# Patient Record
Sex: FEMALE | Race: WHITE | NOT HISPANIC OR LATINO | Employment: OTHER | URBAN - METROPOLITAN AREA
[De-identification: names, ages, dates, MRNs, and addresses within clinical notes are randomized per-mention and may not be internally consistent; named-entity substitution may affect disease eponyms.]

---

## 2023-08-29 ENCOUNTER — HOSPITAL ENCOUNTER (OUTPATIENT)
Dept: DATA CONVERSION | Facility: HOSPITAL | Age: 73
Discharge: HOME | End: 2023-08-29
Payer: MEDICARE

## 2023-08-29 DIAGNOSIS — Z12.31 ENCOUNTER FOR SCREENING MAMMOGRAM FOR MALIGNANT NEOPLASM OF BREAST: ICD-10-CM

## 2023-08-30 PROBLEM — E86.0 DEHYDRATION: Status: ACTIVE | Noted: 2023-08-30

## 2023-08-30 PROBLEM — H60.509 ACUTE OTITIS EXTERNA: Status: RESOLVED | Noted: 2023-08-30 | Resolved: 2023-08-30

## 2023-08-30 PROBLEM — E78.2 MIXED HYPERLIPIDEMIA: Status: ACTIVE | Noted: 2023-08-30

## 2023-08-30 PROBLEM — R22.0 SWELLING, MASS, OR LUMP ON FACE: Status: ACTIVE | Noted: 2023-08-30

## 2023-08-30 PROBLEM — G63 NEUROPATHY ASSOCIATED WITH ENDOCRINE DISORDER (MULTI): Status: ACTIVE | Noted: 2023-08-30

## 2023-08-30 PROBLEM — H04.123 BILATERAL DRY EYES: Status: ACTIVE | Noted: 2023-08-30

## 2023-08-30 PROBLEM — R26.81 UNSTEADY GAIT: Status: ACTIVE | Noted: 2023-08-30

## 2023-08-30 PROBLEM — J31.0 RHINITIS: Status: ACTIVE | Noted: 2023-08-30

## 2023-08-30 PROBLEM — Z96.659 S/P KNEE REPLACEMENT: Status: ACTIVE | Noted: 2023-08-30

## 2023-08-30 PROBLEM — N64.4 MASTODYNIA: Status: ACTIVE | Noted: 2023-08-30

## 2023-08-30 PROBLEM — R40.1 CLOUDED CONSCIOUSNESS: Status: ACTIVE | Noted: 2023-08-30

## 2023-08-30 PROBLEM — N32.81 OVERACTIVE BLADDER: Status: ACTIVE | Noted: 2023-08-30

## 2023-08-30 PROBLEM — H92.09 EAR PAIN: Status: ACTIVE | Noted: 2023-08-30

## 2023-08-30 PROBLEM — H69.91 DYSFUNCTION OF RIGHT EUSTACHIAN TUBE: Status: ACTIVE | Noted: 2023-08-30

## 2023-08-30 PROBLEM — E11.9 DIABETES MELLITUS, TYPE 2 (MULTI): Status: ACTIVE | Noted: 2023-08-30

## 2023-08-30 PROBLEM — F41.9 ANXIETY DISORDER: Status: ACTIVE | Noted: 2023-08-30

## 2023-08-30 PROBLEM — F32.A DEPRESSION: Status: ACTIVE | Noted: 2023-08-30

## 2023-08-30 PROBLEM — S69.90XA INJURY OF HAND: Status: ACTIVE | Noted: 2023-08-30

## 2023-08-30 PROBLEM — D50.9 IRON DEFICIENCY ANEMIA: Status: ACTIVE | Noted: 2023-08-30

## 2023-08-30 PROBLEM — R42 LIGHTHEADEDNESS: Status: ACTIVE | Noted: 2023-08-30
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
## 2023-08-30 PROBLEM — E11.9 DIABETES MELLITUS (MULTI): Status: ACTIVE | Noted: 2023-08-30

## 2023-08-30 PROBLEM — K21.9 GASTROESOPHAGEAL REFLUX DISEASE: Status: ACTIVE | Noted: 2023-08-30

## 2023-08-30 PROBLEM — R42 DIZZINESS: Status: ACTIVE | Noted: 2023-08-30

## 2023-08-30 PROBLEM — Z95.9 CARDIAC DEVICE IN SITU: Status: ACTIVE | Noted: 2023-08-30

## 2023-08-30 PROBLEM — E87.6 HYPOKALEMIA: Status: ACTIVE | Noted: 2023-08-30

## 2023-08-30 PROBLEM — N60.19 FIBROCYSTIC BREAST CHANGES: Status: ACTIVE | Noted: 2023-08-30

## 2023-08-30 PROBLEM — F31.9 BIPOLAR DISORDER (MULTI): Status: ACTIVE | Noted: 2023-08-30

## 2023-08-30 PROBLEM — M19.90 OSTEOARTHRITIS: Status: ACTIVE | Noted: 2023-08-30

## 2023-08-30 PROBLEM — K13.70 ORAL MUCOSAL LESION: Status: ACTIVE | Noted: 2023-08-30

## 2023-08-30 PROBLEM — R13.10 DYSPHAGIA: Status: ACTIVE | Noted: 2023-08-30

## 2023-08-30 PROBLEM — M25.559 HIP PAIN: Status: ACTIVE | Noted: 2023-08-30

## 2023-08-30 PROBLEM — M26.609 TMJ (TEMPOROMANDIBULAR JOINT SYNDROME): Status: ACTIVE | Noted: 2023-08-30

## 2023-08-30 PROBLEM — D33.3 ACOUSTIC NEUROMA (MULTI): Status: ACTIVE | Noted: 2023-08-30

## 2023-08-30 PROBLEM — E78.5 DYSLIPIDEMIA: Status: ACTIVE | Noted: 2023-08-30

## 2023-08-30 PROBLEM — I12.9 HYPERTENSIVE CHRONIC KIDNEY DISEASE WITH STAGE 1 THROUGH STAGE 4 CHRONIC KIDNEY DISEASE, OR UNSPECIFIED CHRONIC KIDNEY DISEASE: Status: ACTIVE | Noted: 2023-08-30

## 2023-08-30 PROBLEM — S89.90XA INJURY OF KNEE: Status: ACTIVE | Noted: 2023-08-30

## 2023-08-30 PROBLEM — S09.92XA NOSE INJURY: Status: ACTIVE | Noted: 2023-08-30

## 2023-08-30 PROBLEM — E78.00 HIGH CHOLESTEROL: Status: ACTIVE | Noted: 2023-08-30

## 2023-08-30 PROBLEM — E55.9 VITAMIN D DEFICIENCY: Status: ACTIVE | Noted: 2023-08-30

## 2023-08-30 PROBLEM — N39.0 ACUTE LOWER URINARY TRACT INFECTION: Status: RESOLVED | Noted: 2023-08-30 | Resolved: 2023-08-30

## 2023-08-30 PROBLEM — I42.9 CARDIOMYOPATHY (MULTI): Status: ACTIVE | Noted: 2023-08-30

## 2023-08-30 PROBLEM — I63.9 CEREBROVASCULAR ACCIDENT (MULTI): Status: ACTIVE | Noted: 2023-08-30

## 2023-08-30 PROBLEM — G45.9 TRANSIENT ISCHEMIC ATTACK: Status: ACTIVE | Noted: 2023-08-30

## 2023-08-30 PROBLEM — F31.9 AFFECTIVE PSYCHOSIS, BIPOLAR (MULTI): Status: ACTIVE | Noted: 2023-08-30

## 2023-08-30 PROBLEM — I10 ESSENTIAL (PRIMARY) HYPERTENSION: Status: ACTIVE | Noted: 2023-08-30

## 2023-08-30 PROBLEM — H53.8 HAZY VISION: Status: ACTIVE | Noted: 2023-08-30

## 2023-08-30 PROBLEM — D64.9 ANEMIA: Status: ACTIVE | Noted: 2023-08-30

## 2023-08-30 PROBLEM — E34.9 NEUROPATHY ASSOCIATED WITH ENDOCRINE DISORDER (MULTI): Status: ACTIVE | Noted: 2023-08-30

## 2023-08-30 PROBLEM — R11.2 NAUSEA & VOMITING: Status: ACTIVE | Noted: 2023-08-30

## 2023-08-30 PROBLEM — S09.90XA INJURY OF HEAD: Status: ACTIVE | Noted: 2023-08-30

## 2023-08-30 PROBLEM — R73.09 BLOOD GLUCOSE ABNORMAL: Status: ACTIVE | Noted: 2023-08-30

## 2023-08-30 PROBLEM — R10.9 ABDOMINAL PAIN: Status: ACTIVE | Noted: 2023-08-30

## 2023-08-30 PROBLEM — N63.0 BREAST LUMP: Status: ACTIVE | Noted: 2023-08-30

## 2023-08-30 PROBLEM — F99 MENTAL DISORDER: Status: ACTIVE | Noted: 2023-08-30

## 2023-08-30 PROBLEM — N60.09 SOLITARY CYST OF BREAST: Status: ACTIVE | Noted: 2023-08-30

## 2023-08-30 PROBLEM — I51.89 IMPAIRED CARDIAC FUNCTION: Status: ACTIVE | Noted: 2023-08-30

## 2023-08-30 PROBLEM — R55 SYNCOPE: Status: ACTIVE | Noted: 2023-08-30

## 2023-08-30 RX ORDER — SERTRALINE HYDROCHLORIDE 50 MG/1
2 TABLET, FILM COATED ORAL DAILY
COMMUNITY
Start: 2017-03-27 | End: 2023-11-08 | Stop reason: ALTCHOICE

## 2023-08-30 RX ORDER — LORATADINE 10 MG/1
10 TABLET ORAL AS NEEDED
COMMUNITY

## 2023-08-30 RX ORDER — BLOOD-GLUCOSE TRANSMITTER
EACH MISCELLANEOUS
COMMUNITY
Start: 2021-07-07 | End: 2024-02-20 | Stop reason: WASHOUT

## 2023-08-30 RX ORDER — TRAMADOL HYDROCHLORIDE 50 MG/1
1 TABLET ORAL EVERY 6 HOURS PRN
COMMUNITY
Start: 2023-02-08 | End: 2024-02-20 | Stop reason: WASHOUT

## 2023-08-30 RX ORDER — HYDROCHLOROTHIAZIDE 12.5 MG/1
1 TABLET ORAL EVERY MORNING
COMMUNITY
Start: 2017-08-09

## 2023-08-30 RX ORDER — HYDROCODONE BITARTRATE AND ACETAMINOPHEN 7.5; 3 MG/1; MG/1
TABLET ORAL
COMMUNITY
Start: 2017-01-25 | End: 2024-02-20 | Stop reason: WASHOUT

## 2023-08-30 RX ORDER — OMEPRAZOLE 40 MG/1
1 CAPSULE, DELAYED RELEASE ORAL DAILY
COMMUNITY
Start: 2010-08-02 | End: 2023-11-08 | Stop reason: ALTCHOICE

## 2023-08-30 RX ORDER — BIOTIN 1 MG
TABLET ORAL 4 TIMES DAILY
COMMUNITY
Start: 2015-01-21 | End: 2024-03-13 | Stop reason: WASHOUT

## 2023-08-30 RX ORDER — ACETAMINOPHEN 325 MG/1
1 TABLET ORAL EVERY 6 HOURS PRN
COMMUNITY
End: 2023-11-08 | Stop reason: ALTCHOICE

## 2023-08-30 RX ORDER — FLUTICASONE PROPIONATE 50 MCG
2 SPRAY, SUSPENSION (ML) NASAL DAILY
COMMUNITY
Start: 2021-10-22 | End: 2023-11-08 | Stop reason: ALTCHOICE

## 2023-08-30 RX ORDER — LAMOTRIGINE 200 MG/1
1 TABLET ORAL DAILY
COMMUNITY

## 2023-08-30 RX ORDER — CHOLECALCIFEROL (VITAMIN D3) 25 MCG
2 TABLET ORAL DAILY
COMMUNITY

## 2023-08-30 RX ORDER — LISINOPRIL 10 MG/1
TABLET ORAL DAILY
COMMUNITY
Start: 2023-01-06 | End: 2024-02-14 | Stop reason: DRUGHIGH

## 2023-08-30 RX ORDER — TRIAMCINOLONE ACETONIDE 1 MG/G
1 PASTE DENTAL 3 TIMES DAILY
COMMUNITY
Start: 2017-11-10 | End: 2023-11-08 | Stop reason: ALTCHOICE

## 2023-08-30 RX ORDER — TIZANIDINE 4 MG/1
4 TABLET ORAL 2 TIMES DAILY PRN
COMMUNITY
Start: 2023-07-25

## 2023-08-30 RX ORDER — INSULIN GLARGINE 100 [IU]/ML
INJECTION, SOLUTION SUBCUTANEOUS
COMMUNITY
End: 2023-11-08 | Stop reason: ALTCHOICE

## 2023-08-30 RX ORDER — SERTRALINE HYDROCHLORIDE 50 MG/1
1 TABLET, FILM COATED ORAL DAILY
COMMUNITY

## 2023-08-30 RX ORDER — CHOLECALCIFEROL (VITAMIN D3) 25 MCG
1 TABLET ORAL DAILY
COMMUNITY
End: 2023-11-08 | Stop reason: ALTCHOICE

## 2023-08-30 RX ORDER — DOCUSATE SODIUM 100 MG/1
1 CAPSULE, LIQUID FILLED ORAL DAILY PRN
COMMUNITY

## 2023-08-30 RX ORDER — TRIAMCINOLONE ACETONIDE 1 MG/G
1 CREAM TOPICAL 2 TIMES WEEKLY
COMMUNITY
End: 2024-02-14 | Stop reason: ALTCHOICE

## 2023-08-30 RX ORDER — POLYETHYLENE GLYCOL 3350 17 G/17G
17 POWDER, FOR SOLUTION ORAL DAILY PRN
COMMUNITY

## 2023-08-30 RX ORDER — CLOBETASOL PROPIONATE 0.5 MG/G
1 CREAM TOPICAL 2 TIMES DAILY
COMMUNITY
End: 2024-02-20 | Stop reason: WASHOUT

## 2023-08-30 RX ORDER — IMIPRAMINE HYDROCHLORIDE 25 MG/1
1 TABLET, FILM COATED ORAL NIGHTLY
COMMUNITY
Start: 2017-01-17 | End: 2023-11-08 | Stop reason: ALTCHOICE

## 2023-08-30 RX ORDER — METFORMIN HYDROCHLORIDE 500 MG/1
500 TABLET ORAL 2 TIMES DAILY
COMMUNITY
Start: 2014-05-20 | End: 2024-03-13 | Stop reason: WASHOUT

## 2023-08-30 RX ORDER — ACETAMINOPHEN 500 MG
2 TABLET ORAL EVERY 6 HOURS PRN
COMMUNITY
Start: 2023-02-08 | End: 2024-02-20 | Stop reason: WASHOUT

## 2023-08-30 RX ORDER — OMEPRAZOLE 20 MG/1
40 CAPSULE, DELAYED RELEASE ORAL DAILY
COMMUNITY
End: 2024-05-15 | Stop reason: WASHOUT

## 2023-08-30 RX ORDER — CHLORHEXIDINE GLUCONATE ORAL RINSE 1.2 MG/ML
15 SOLUTION DENTAL 2 TIMES DAILY
COMMUNITY
Start: 2017-02-15 | End: 2023-11-08 | Stop reason: ALTCHOICE

## 2023-08-30 RX ORDER — LANOLIN ALCOHOL/MO/W.PET/CERES
1 CREAM (GRAM) TOPICAL DAILY
COMMUNITY
Start: 2020-09-16 | End: 2024-05-20 | Stop reason: WASHOUT

## 2023-08-30 RX ORDER — BLOOD-GLUCOSE,RECEIVER,CONT
EACH MISCELLANEOUS
COMMUNITY
Start: 2021-07-07 | End: 2024-02-20 | Stop reason: WASHOUT

## 2023-08-30 RX ORDER — LOPERAMIDE HCL 2 MG
1 TABLET ORAL 4 TIMES DAILY PRN
COMMUNITY

## 2023-08-30 RX ORDER — FERROUS SULFATE 325(65) MG
1 TABLET, DELAYED RELEASE (ENTERIC COATED) ORAL
COMMUNITY
Start: 2020-06-08

## 2023-08-30 RX ORDER — GLIPIZIDE 5 MG/1
1 TABLET ORAL 2 TIMES DAILY
COMMUNITY
Start: 2014-05-20 | End: 2024-02-20

## 2023-08-30 RX ORDER — HUMAN INSULIN 100 [IU]/ML
INJECTION, SUSPENSION SUBCUTANEOUS
COMMUNITY
End: 2023-11-08 | Stop reason: ALTCHOICE

## 2023-08-30 RX ORDER — ATORVASTATIN CALCIUM 40 MG/1
1 TABLET, FILM COATED ORAL DAILY
COMMUNITY
Start: 2016-05-23 | End: 2024-06-10 | Stop reason: SDUPTHER

## 2023-08-30 RX ORDER — FLUTICASONE PROPIONATE 50 MCG
1 SPRAY, SUSPENSION (ML) NASAL DAILY PRN
COMMUNITY

## 2023-08-30 RX ORDER — MIRTAZAPINE 15 MG/1
1 TABLET, FILM COATED ORAL NIGHTLY
COMMUNITY
Start: 2017-03-27 | End: 2024-02-14 | Stop reason: ALTCHOICE

## 2023-09-19 ENCOUNTER — DOCUMENTATION (OUTPATIENT)
Dept: PRIMARY CARE | Facility: CLINIC | Age: 73
End: 2023-09-19
Payer: MEDICARE

## 2023-10-03 ENCOUNTER — ANCILLARY PROCEDURE (OUTPATIENT)
Dept: RADIOLOGY | Facility: CLINIC | Age: 73
End: 2023-10-03
Payer: MEDICARE

## 2023-10-03 DIAGNOSIS — S06.5XAA TRAUMATIC SUBDURAL HEMORRHAGE WITH LOSS OF CONSCIOUSNESS STATUS UNKNOWN, INITIAL ENCOUNTER (MULTI): ICD-10-CM

## 2023-10-03 PROCEDURE — 70450 CT HEAD/BRAIN W/O DYE: CPT

## 2023-10-20 ENCOUNTER — PATIENT OUTREACH (OUTPATIENT)
Dept: PRIMARY CARE | Facility: CLINIC | Age: 73
End: 2023-10-20
Payer: MEDICARE

## 2023-10-20 DIAGNOSIS — E78.2 MIXED HYPERLIPIDEMIA: ICD-10-CM

## 2023-10-20 DIAGNOSIS — I10 ESSENTIAL (PRIMARY) HYPERTENSION: ICD-10-CM

## 2023-10-25 ENCOUNTER — PATIENT OUTREACH (OUTPATIENT)
Dept: PRIMARY CARE | Facility: CLINIC | Age: 73
End: 2023-10-25
Payer: MEDICARE

## 2023-10-27 ENCOUNTER — LAB (OUTPATIENT)
Dept: LAB | Facility: LAB | Age: 73
End: 2023-10-27
Payer: MEDICARE

## 2023-10-27 DIAGNOSIS — I10 ESSENTIAL (PRIMARY) HYPERTENSION: ICD-10-CM

## 2023-10-27 DIAGNOSIS — E34.9 ENDOCRINE DISORDER, UNSPECIFIED: ICD-10-CM

## 2023-10-27 DIAGNOSIS — E11.9 TYPE 2 DIABETES MELLITUS WITHOUT COMPLICATIONS (MULTI): Primary | ICD-10-CM

## 2023-10-27 LAB
ALBUMIN SERPL-MCNC: 4.3 G/DL (ref 3.5–5)
ALP BLD-CCNC: 156 U/L (ref 35–125)
ALT SERPL-CCNC: 9 U/L (ref 5–40)
ANION GAP SERPL CALC-SCNC: 17 MMOL/L
AST SERPL-CCNC: 14 U/L (ref 5–40)
BILIRUB SERPL-MCNC: 0.3 MG/DL (ref 0.1–1.2)
BUN SERPL-MCNC: 24 MG/DL (ref 8–25)
CALCIUM SERPL-MCNC: 9.7 MG/DL (ref 8.5–10.4)
CHLORIDE SERPL-SCNC: 98 MMOL/L (ref 97–107)
CO2 SERPL-SCNC: 26 MMOL/L (ref 24–31)
CREAT SERPL-MCNC: 1.8 MG/DL (ref 0.4–1.6)
ERYTHROCYTE [DISTWIDTH] IN BLOOD BY AUTOMATED COUNT: 12.8 % (ref 11.5–14.5)
EST. AVERAGE GLUCOSE BLD GHB EST-MCNC: 146 MG/DL
GFR SERPL CREATININE-BSD FRML MDRD: 30 ML/MIN/1.73M*2
GLUCOSE SERPL-MCNC: 183 MG/DL (ref 65–99)
HBA1C MFR BLD: 6.7 %
HCT VFR BLD AUTO: 37.9 % (ref 36–46)
HGB BLD-MCNC: 12 G/DL (ref 12–16)
MCH RBC QN AUTO: 27.3 PG (ref 26–34)
MCHC RBC AUTO-ENTMCNC: 31.7 G/DL (ref 32–36)
MCV RBC AUTO: 86 FL (ref 80–100)
NRBC BLD-RTO: 0 /100 WBCS (ref 0–0)
PLATELET # BLD AUTO: 339 X10*3/UL (ref 150–450)
PMV BLD AUTO: 10.8 FL (ref 7.5–11.5)
POTASSIUM SERPL-SCNC: 4.1 MMOL/L (ref 3.4–5.1)
PROT SERPL-MCNC: 7 G/DL (ref 5.9–7.9)
RBC # BLD AUTO: 4.4 X10*6/UL (ref 4–5.2)
SODIUM SERPL-SCNC: 141 MMOL/L (ref 133–145)
WBC # BLD AUTO: 6.5 X10*3/UL (ref 4.4–11.3)

## 2023-10-27 PROCEDURE — 83036 HEMOGLOBIN GLYCOSYLATED A1C: CPT

## 2023-10-27 PROCEDURE — 36415 COLL VENOUS BLD VENIPUNCTURE: CPT

## 2023-10-27 PROCEDURE — 85027 COMPLETE CBC AUTOMATED: CPT

## 2023-10-27 PROCEDURE — 86803 HEPATITIS C AB TEST: CPT

## 2023-10-27 PROCEDURE — 80053 COMPREHEN METABOLIC PANEL: CPT

## 2023-10-28 LAB — HCV AB SER QL: NONREACTIVE

## 2023-10-30 ENCOUNTER — PATIENT OUTREACH (OUTPATIENT)
Dept: PRIMARY CARE | Facility: CLINIC | Age: 73
End: 2023-10-30
Payer: MEDICARE

## 2023-10-30 NOTE — PROGRESS NOTES
Attempted to reach patient-several messages left- no return call back yet. Will reattempt next month.

## 2023-11-06 PROBLEM — M25.559 HIP PAIN: Status: RESOLVED | Noted: 2023-08-30 | Resolved: 2023-11-06

## 2023-11-06 PROBLEM — S69.90XA INJURY OF HAND: Status: RESOLVED | Noted: 2023-08-30 | Resolved: 2023-11-06

## 2023-11-06 PROBLEM — R42 LIGHTHEADEDNESS: Status: RESOLVED | Noted: 2023-08-30 | Resolved: 2023-11-06

## 2023-11-06 PROBLEM — H92.09 EAR PAIN: Status: RESOLVED | Noted: 2023-08-30 | Resolved: 2023-11-06

## 2023-11-06 PROBLEM — N60.09 SOLITARY CYST OF BREAST: Status: RESOLVED | Noted: 2023-08-30 | Resolved: 2023-11-06

## 2023-11-06 PROBLEM — R22.0 SWELLING, MASS, OR LUMP ON FACE: Status: RESOLVED | Noted: 2023-08-30 | Resolved: 2023-11-06

## 2023-11-06 PROBLEM — N64.4 MASTODYNIA: Status: RESOLVED | Noted: 2023-08-30 | Resolved: 2023-11-06

## 2023-11-06 PROBLEM — R13.10 DYSPHAGIA: Status: RESOLVED | Noted: 2023-08-30 | Resolved: 2023-11-06

## 2023-11-06 PROBLEM — J31.0 RHINITIS: Status: RESOLVED | Noted: 2023-08-30 | Resolved: 2023-11-06

## 2023-11-06 PROBLEM — E86.0 DEHYDRATION: Status: RESOLVED | Noted: 2023-08-30 | Resolved: 2023-11-06

## 2023-11-06 PROBLEM — R10.9 ABDOMINAL PAIN: Status: RESOLVED | Noted: 2023-08-30 | Resolved: 2023-11-06

## 2023-11-06 PROBLEM — R26.81 UNSTEADY GAIT: Status: RESOLVED | Noted: 2023-08-30 | Resolved: 2023-11-06

## 2023-11-06 PROBLEM — N63.0 BREAST LUMP: Status: RESOLVED | Noted: 2023-08-30 | Resolved: 2023-11-06

## 2023-11-06 PROBLEM — S09.92XA NOSE INJURY: Status: RESOLVED | Noted: 2023-08-30 | Resolved: 2023-11-06

## 2023-11-06 PROBLEM — F99 MENTAL DISORDER: Status: RESOLVED | Noted: 2023-08-30 | Resolved: 2023-11-06

## 2023-11-06 PROBLEM — H53.8 HAZY VISION: Status: RESOLVED | Noted: 2023-08-30 | Resolved: 2023-11-06

## 2023-11-06 PROBLEM — R42 DIZZINESS: Status: RESOLVED | Noted: 2023-08-30 | Resolved: 2023-11-06

## 2023-11-06 PROBLEM — D33.3 ACOUSTIC NEUROMA (MULTI): Status: RESOLVED | Noted: 2023-08-30 | Resolved: 2023-11-06

## 2023-11-06 PROBLEM — R11.2 NAUSEA & VOMITING: Status: RESOLVED | Noted: 2023-08-30 | Resolved: 2023-11-06
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
## 2023-11-06 PROBLEM — S89.90XA INJURY OF KNEE: Status: RESOLVED | Noted: 2023-08-30 | Resolved: 2023-11-06

## 2023-11-06 PROBLEM — I63.9 CEREBROVASCULAR ACCIDENT (MULTI): Status: RESOLVED | Noted: 2023-08-30 | Resolved: 2023-11-06

## 2023-11-06 PROBLEM — K13.70 ORAL MUCOSAL LESION: Status: RESOLVED | Noted: 2023-08-30 | Resolved: 2023-11-06

## 2023-11-08 ENCOUNTER — OFFICE VISIT (OUTPATIENT)
Dept: PRIMARY CARE | Facility: CLINIC | Age: 73
End: 2023-11-08
Payer: MEDICARE

## 2023-11-08 VITALS
OXYGEN SATURATION: 99 % | SYSTOLIC BLOOD PRESSURE: 148 MMHG | TEMPERATURE: 95.7 F | BODY MASS INDEX: 35.07 KG/M2 | WEIGHT: 198 LBS | DIASTOLIC BLOOD PRESSURE: 86 MMHG | HEART RATE: 88 BPM

## 2023-11-08 DIAGNOSIS — D50.9 IRON DEFICIENCY ANEMIA, UNSPECIFIED IRON DEFICIENCY ANEMIA TYPE: ICD-10-CM

## 2023-11-08 DIAGNOSIS — D64.9 ANEMIA, UNSPECIFIED TYPE: ICD-10-CM

## 2023-11-08 DIAGNOSIS — F41.9 ANXIETY DISORDER, UNSPECIFIED TYPE: ICD-10-CM

## 2023-11-08 DIAGNOSIS — E55.9 VITAMIN D DEFICIENCY: ICD-10-CM

## 2023-11-08 DIAGNOSIS — E34.9 NEUROPATHY ASSOCIATED WITH ENDOCRINE DISORDER (MULTI): ICD-10-CM

## 2023-11-08 DIAGNOSIS — F32.A DEPRESSION, UNSPECIFIED DEPRESSION TYPE: ICD-10-CM

## 2023-11-08 DIAGNOSIS — F31.9 BIPOLAR AFFECTIVE DISORDER, REMISSION STATUS UNSPECIFIED (MULTI): ICD-10-CM

## 2023-11-08 DIAGNOSIS — M19.91 PRIMARY OSTEOARTHRITIS, UNSPECIFIED SITE: ICD-10-CM

## 2023-11-08 DIAGNOSIS — I10 ESSENTIAL (PRIMARY) HYPERTENSION: ICD-10-CM

## 2023-11-08 DIAGNOSIS — E11.9 TYPE 2 DIABETES MELLITUS WITHOUT COMPLICATION, WITHOUT LONG-TERM CURRENT USE OF INSULIN (MULTI): Primary | ICD-10-CM

## 2023-11-08 DIAGNOSIS — E78.2 MIXED HYPERLIPIDEMIA: ICD-10-CM

## 2023-11-08 DIAGNOSIS — R73.9 HYPERGLYCEMIA: ICD-10-CM

## 2023-11-08 DIAGNOSIS — N32.81 OVERACTIVE BLADDER: ICD-10-CM

## 2023-11-08 DIAGNOSIS — Z01.89 ENCOUNTER FOR ROUTINE LABORATORY TESTING: ICD-10-CM

## 2023-11-08 DIAGNOSIS — G63 NEUROPATHY ASSOCIATED WITH ENDOCRINE DISORDER (MULTI): ICD-10-CM

## 2023-11-08 DIAGNOSIS — N18.32 STAGE 3B CHRONIC KIDNEY DISEASE (MULTI): ICD-10-CM

## 2023-11-08 PROCEDURE — 4010F ACE/ARB THERAPY RXD/TAKEN: CPT | Performed by: INTERNAL MEDICINE

## 2023-11-08 PROCEDURE — 1036F TOBACCO NON-USER: CPT | Performed by: INTERNAL MEDICINE

## 2023-11-08 PROCEDURE — 3079F DIAST BP 80-89 MM HG: CPT | Performed by: INTERNAL MEDICINE

## 2023-11-08 PROCEDURE — 99214 OFFICE O/P EST MOD 30 MIN: CPT | Performed by: INTERNAL MEDICINE

## 2023-11-08 PROCEDURE — 1160F RVW MEDS BY RX/DR IN RCRD: CPT | Performed by: INTERNAL MEDICINE

## 2023-11-08 PROCEDURE — 1126F AMNT PAIN NOTED NONE PRSNT: CPT | Performed by: INTERNAL MEDICINE

## 2023-11-08 PROCEDURE — 3066F NEPHROPATHY DOC TX: CPT | Performed by: INTERNAL MEDICINE

## 2023-11-08 PROCEDURE — 1159F MED LIST DOCD IN RCRD: CPT | Performed by: INTERNAL MEDICINE

## 2023-11-08 PROCEDURE — 3077F SYST BP >= 140 MM HG: CPT | Performed by: INTERNAL MEDICINE

## 2023-11-08 PROCEDURE — 3044F HG A1C LEVEL LT 7.0%: CPT | Performed by: INTERNAL MEDICINE

## 2023-11-08 ASSESSMENT — ENCOUNTER SYMPTOMS
RESPIRATORY NEGATIVE: 1
OCCASIONAL FEELINGS OF UNSTEADINESS: 1
CARDIOVASCULAR NEGATIVE: 1
CONSTITUTIONAL NEGATIVE: 1
DEPRESSION: 0
GASTROINTESTINAL NEGATIVE: 1
NUMBNESS: 1
LOSS OF SENSATION IN FEET: 1

## 2023-11-08 ASSESSMENT — PATIENT HEALTH QUESTIONNAIRE - PHQ9
1. LITTLE INTEREST OR PLEASURE IN DOING THINGS: NOT AT ALL
SUM OF ALL RESPONSES TO PHQ9 QUESTIONS 1 AND 2: 0
2. FEELING DOWN, DEPRESSED OR HOPELESS: NOT AT ALL

## 2023-11-08 ASSESSMENT — PAIN SCALES - GENERAL: PAINLEVEL: 0-NO PAIN

## 2023-11-08 NOTE — PATIENT INSTRUCTIONS
A chance today to review your chronic medical problems.  Everything seems to be under pretty good control except for the slight elevation in your kidney number.  As we discussed be sure to avoid over-the-counter medications containing ibuprofen or naproxen.  I do not think you are taking those right now.  We will get an ultrasound of your kidneys and repeat these blood test.  Otherwise the only other additional change we made today is we will send you to see the neurologist to talk about your neuropathy in your feet.  We will continue your current medications as is and I will plan on seeing you back in about 3 months.  Please enjoy your upcoming trip to the [facility]

## 2023-11-08 NOTE — PROGRESS NOTES
[facility]: [facility]  PROGRESS NOTE      Daniela Epstein is a 72 y.o. female that is presenting today for Follow-up.    Assessment/Plan   Diagnoses and all orders for this visit:  Type 2 diabetes mellitus without complication, without long-term current use of insulin (CMS/[facility])  Depression, unspecified depression type  Bipolar affective disorder, remission status unspecified (CMS/[facility])  Anxiety disorder, unspecified type  Mixed hyperlipidemia  -     Lipid Panel; Future  Essential (primary) hypertension  -     Comprehensive Metabolic Panel; Future  Anemia, unspecified type  -     CBC and Auto Differential; Future  Iron deficiency anemia, unspecified iron deficiency anemia type  Neuropathy associated with endocrine disorder (CMS/[facility])  -     Referral to Neurology; Future  Overactive bladder  Primary osteoarthritis, unspecified site  Vitamin D deficiency  Stage 3b chronic kidney disease (CMS/[facility])  -     US renal complete; Future  -     Basic Metabolic Panel; Future  Encounter for routine laboratory testing  -     Hemoglobin A1C; Future  Hyperglycemia  -     Hemoglobin A1C; Future  Other orders  -     Follow Up In Primary Care - Established; Future  We reviewed her chronic medical problems.  Her sugars are little bit worse but she professes that she has difficulty avoiding marshmallows.  Her blood pressure cholesterol and anemia are all okay.  In terms of her neuropathy she continues to have a lot of neuropathic pain in her feet which we discussed.  Given that she is already taking a lot of medications we are going to have her consult with neurology as to appropriate next steps.  We did notice that she has developed a little bit of chronic kidney disease most likely related to her chronic hypertension and diabetes.  I will get an ultrasound of her kidneys just for sure redness.  We discussed avoiding nonsteroidal agents over-the-counter we will keep close eye on these numbers.  Flu shot is offered  today but she declines  Subjective   Today for follow-up care for her multiple medical problems as enumerated.  For the most part she has been doing okay and has recovered from her brain injury earlier in the year.  She still has problems with her headaches and sees her neurologist at the [facility].  She also has some neuropathic pain in her feet that continues to bother her.  Otherwise we seem to be doing okay.  She does not have polyuria polydipsia polyphagia she does not report any issues with blood pressure control.      Review of Systems   Constitutional: Negative.    Respiratory: Negative.     Cardiovascular: Negative.    Gastrointestinal: Negative.    Genitourinary: Negative.    Neurological:  Positive for numbness.      Objective   Vitals:    11/08/23 0824   BP: 148/86   Pulse: 88   Temp: 35.4 °C (95.7 °F)   SpO2: 99%      Body mass index is 35.07 kg/m².  Physical Exam  HENT:      Head: Normocephalic.      Mouth/Throat:      Mouth: Mucous membranes are moist.   Eyes:      Pupils: Pupils are equal, round, and reactive to light.   Cardiovascular:      Rate and Rhythm: Normal rate and regular rhythm.      Pulses:           Dorsalis pedis pulses are 3+ on the right side and 3+ on the left side.      Heart sounds: Normal heart sounds.   Pulmonary:      Breath sounds: Normal breath sounds.   Abdominal:      General: Abdomen is flat. Bowel sounds are normal.      Palpations: Abdomen is soft.   Musculoskeletal:         General: Normal range of motion.   Feet:      Right foot:      Protective Sensation: 5 sites tested.  0 sites sensed.      Skin integrity: Skin integrity normal.      Left foot:      Protective Sensation: 5 sites tested.  0 sites sensed.      Skin integrity: Skin integrity normal.   Skin:     General: Skin is warm.   Neurological:      General: No focal deficit present.      Mental Status: She is alert and oriented to person, place, and time.   Psychiatric:         Mood and Affect: Mood normal.  "      Diagnostic Results   Lab Results   Component Value Date    GLUCOSE 183 (H) 10/27/2023    CALCIUM 9.7 10/27/2023     10/27/2023    K 4.1 10/27/2023    CO2 26 10/27/2023    CL 98 10/27/2023    BUN 24 10/27/2023    CREATININE 1.80 (H) 10/27/2023     Lab Results   Component Value Date    ALT 9 10/27/2023    AST 14 10/27/2023    GGT 18 09/03/2018    ALKPHOS 156 (H) 10/27/2023    BILITOT 0.3 10/27/2023     Lab Results   Component Value Date    WBC 6.5 10/27/2023    HGB 12.0 10/27/2023    HCT 37.9 10/27/2023    MCV 86 10/27/2023     10/27/2023     Lab Results   Component Value Date    CHOL 148 07/06/2023    CHOL 145 07/11/2022    CHOL 187 03/12/2022     Lab Results   Component Value Date    HDL 65 07/06/2023    HDL 65 07/11/2022    HDL 56 03/12/2022     Lab Results   Component Value Date    LDLCALC 59 (L) 07/06/2023    LDLCALC 51 (L) 07/11/2022    LDLCALC 98 03/12/2022     Lab Results   Component Value Date    TRIG 121 07/06/2023    TRIG 145 07/11/2022    TRIG 163 (H) 03/12/2022     No components found for: \"CHOLHDL\"  Lab Results   Component Value Date    HGBA1C 6.7 (H) 10/27/2023     Other labs not included in the list above were reviewed either before or during this encounter.    History    Past Medical History:   Diagnosis Date    Acoustic neuroma (CMS/[facility]) 08/30/2023    Acute lower urinary tract infection 08/30/2023    Affective psychosis, bipolar (CMS/[facility]) 08/30/2023    Age-related nuclear cataract, left eye 10/16/2015    Age-related nuclear cataract of left eye    Age-related nuclear cataract, right eye 10/16/2015    Age-related nuclear cataract of right eye    Anxiety disorder 08/30/2023    Bilateral dry eyes 08/30/2023    Bipolar disorder (CMS/[facility]) 08/30/2023    Breast lump 08/30/2023    Cardiac device in situ 08/30/2023    Cerebrovascular accident (CMS/[facility]) 08/30/2023    Depression 08/30/2023    Depression, unspecified 05/20/2014    Depression    Diabetes mellitus (CMS/[facility]) 08/30/2023    " Diabetes mellitus, type 2 (CMS/[facility]) 08/30/2023    Dry eye syndrome of unspecified lacrimal gland 06/25/2015    Dry eye syndrome    Dysfunction of right eustachian tube 08/30/2023    Dyslipidemia 08/30/2023    Dysphagia 08/30/2023    Essential (primary) hypertension 12/15/2013    Hypertension    Essential (primary) hypertension 08/30/2023    Fibrocystic breast changes 08/30/2023    Gastroesophageal reflux disease 08/30/2023    Hazy vision 08/30/2023    Hypokalemia 08/30/2023    Mastodynia 08/30/2023    Mental disorder 08/30/2023    Mixed hyperlipidemia 08/30/2023    Myopia, bilateral 10/16/2015    High myopia, both eyes    Nausea & vomiting 08/30/2023    Nose injury 08/30/2023    Oral mucosal lesion 08/30/2023    Osteoarthritis 08/30/2023    Other disorders affecting eyelid function 06/25/2015    Excessive involuntary blinking    Overactive bladder 08/30/2023    Personal history of diseases of the blood and blood-forming organs and certain disorders involving the immune mechanism     History of anemia    Personal history of other diseases of the digestive system     History of esophageal reflux    Personal history of other diseases of the nervous system and sense organs     History of cataract    Personal history of other diseases of urinary system     History of bladder problems    Personal history of other mental and behavioral disorders     History of mental disorder    Pure hypercholesterolemia, unspecified 05/20/2014    High cholesterol    Rhinitis 08/30/2023    S/P knee replacement 08/30/2023    Snoring     Snoring    Solitary cyst of breast 08/30/2023    Swelling, mass, or lump on face 08/30/2023    Syncope 08/30/2023    TMJ (temporomandibular joint syndrome) 08/30/2023    Type 2 diabetes mellitus without complications (CMS/[facility]) 06/25/2015    Diabetes mellitus    Unsteady gait 08/30/2023    Vitamin D deficiency 08/30/2023     Past Surgical History:   Procedure Laterality Date    ANKLE SURGERY  1998    injury     APPENDECTOMY  1972    BLEPHAROPLASTY Bilateral 2010    BREAST BIOPSY  2011    BREAST LUMPECTOMY Right 2011    atypical hyperplasia    CARDIAC SURGERY  03/06/2023    implant cardiac event recorder for recurrent syncope/ Dr. Fowler    CARPAL TUNNEL RELEASE Left 2010    Neuroplasty Decompression Median Nerve At Carpal Tunnel    CARPAL TUNNEL RELEASE Right 2010    CHOLECYSTECTOMY  1979    COLONOSCOPY  2007    COLONOSCOPY  2013    COLONOSCOPY  2014    COLONOSCOPY  11/06/2020    CRANIOTOMY Right 2006    acoustic neuroma    CT HEAD ANGIO W AND WO IV CONTRAST  03/22/2023    CT HEAD ANGIO W AND WO IV CONTRAST [facility]    CT NECK ANGIO W AND WO IV CONTRAST  03/22/2023    CT NECK ANGIO W AND WO IV CONTRAST [facility] CT    DILATION AND CURETTAGE OF UTERUS  1971    DILATION AND CURETTAGE OF UTERUS  2013    ESOPHAGOGASTRODUODENOSCOPY  2007    ESOPHAGOGASTRODUODENOSCOPY  2010    ESOPHAGOGASTRODUODENOSCOPY  2020    HEMORRHOID SURGERY  1990    KNEE SURGERY Right 2008    scar tissue    MOUTH SURGERY Right 2017    right buccal lesion    MR HEAD ANGIO WO IV CONTRAST  01/18/2015    MR HEAD ANGIO WO IV CONTRAST [facility]    MR HEAD ANGIO WO IV CONTRAST  05/08/2016    MR HEAD ANGIO WO IV CONTRAST [facility]    MR HEAD ANGIO WO IV CONTRAST  02/28/2017    MR HEAD ANGIO WO IV CONTRAST [facility]    MR NECK ANGIO WO IV CONTRAST  01/18/2015    MR NECK ANGIO WO IV CONTRAST [facility]    NASAL SINUS SURGERY  1985    ORIF FEMUR FRACTURE Right 2022    OVARIAN CYST REMOVAL  1972    SEPTOPLASTY  05/20/2014    Septoplasty    TONSILLECTOMY      TOTAL KNEE ARTHROPLASTY Right 2007    TOTAL KNEE ARTHROPLASTY Left 2011    TUBAL LIGATION  1983    URETHRAL SLING  2013     Family History   Problem Relation Name Age of Onset    Arthritis Mother      Breast cancer Mother      Diabetes Mother      Hypertension Mother      Heart failure Father      Heart disease Father      Arthritis Other SIBLINGS     Diabetes Other SIBLINGS      Hypertension Other SIBLINGS      Social History     Socioeconomic History    Marital status:      Spouse name: Not on file    Number of children: Not on file    Years of education: Not on file    Highest education level: Not on file   Occupational History    Not on file   Tobacco Use    Smoking status: Never    Smokeless tobacco: Never   Substance and Sexual Activity    Alcohol use: Never    Drug use: Never    Sexual activity: Not on file   Other Topics Concern    Not on file   Social History Narrative    Not on file     Social Determinants of Health     Financial Resource Strain: Not on file   Food Insecurity: Not on file   Transportation Needs: Not on file   Physical Activity: Not on file   Stress: Not on file   Social Connections: Not on file   Intimate Partner Violence: Not on file   Housing Stability: Not on file     Allergies   Allergen Reactions    Other Other     TARTICK DYSKENESIA    Phenothiazines Shortness of breath, Hives and Unknown    Erythromycin Hives and Unknown    Haloperidol Unknown     Current Outpatient Medications on File Prior to Visit   Medication Sig Dispense Refill    acetaminophen (Tylenol) 500 mg tablet Take 2 tablets (1,000 mg) by mouth every 6 hours if needed.      atorvastatin (Lipitor) 40 mg tablet Take 1 tablet (40 mg) by mouth once daily.      blood sugar diagnostic (Truetest Test Strips) strip 4 times a day.      blood sugar diagnostic strip Inject under the skin once daily.      cholecalciferol (Vitamin D3) 25 MCG (1000 UT) tablet Take 2 tablets (50 mcg) by mouth once daily.      clobetasol (Temovate) 0.05 % cream Apply 1 Application topically 2 times a day.      cyanocobalamin (Vitamin B-12) 1,000 mcg tablet Take 1 tablet (1,000 mcg) by mouth once daily.      docusate sodium (Colace) 100 mg capsule Take 1 capsule (100 mg) by mouth once daily as needed.      ferrous sulfate 325 (65 Fe) MG EC tablet Take 1 tablet (325 mg) by mouth every other day.      fluticasone  (Flonase) 50 mcg/actuation nasal spray Administer 1 spray into each nostril once daily.      glipiZIDE (Glucotrol) 5 mg tablet Take 1 tablet (5 mg) by mouth 2 times a day.      hydroCHLOROthiazide (HYDRODiuril) 12.5 mg tablet Take 1 tablet (12.5 mg) by mouth once daily in the morning.      HYDROcodone-acetaminophen (Xodol) 7.5-300 mg tablet tablet Take by mouth.      lamoTRIgine (LaMICtal) 200 mg tablet Take 1 tablet (200 mg) by mouth once daily. ODT?      lisinopril 10 mg tablet Take by mouth once daily.      loperamide (Imodium A-D) 2 mg tablet Take 1 tablet (2 mg) by mouth 4 times a day as needed.      loratadine (Claritin) 10 mg tablet Take by mouth once daily.      metFORMIN (Glucophage) 500 mg tablet Take 2 tablets (1,000 mg) by mouth 2 times a day.      mirtazapine (Remeron) 15 mg tablet Take 1 tablet (15 mg) by mouth once daily at bedtime.      omeprazole (PriLOSEC) 20 mg DR capsule Take 1 capsule (20 mg) by mouth once daily.      pen needle, diabetic (BD ULTRA-FINE MINI PEN NEEDLE MISC) As directed dx: E11.9 bid      polyethylene glycol (Miralax) 17 gram packet Take 17 g by mouth once daily. MIX WITH 8 OZ FLUID      sertraline (Zoloft) 50 mg tablet Take 1 tablet (50 mg) by mouth once daily.      tiZANidine (Zanaflex) 4 mg tablet Take 1 tablet (4 mg) by mouth 2 times a day as needed (HEAD OR NECK PAIN).      traMADol (Ultram) 50 mg tablet Take 1 tablet (50 mg) by mouth every 6 hours if needed.      triamcinolone (Kenalog) 0.1 % cream Apply 1 Application topically 2 times a week.      [DISCONTINUED] imipramine (Tofranil) 25 mg tablet Take 1 tablet (25 mg) by mouth once daily at bedtime.      [DISCONTINUED] insulin glargine (Basaglar KwikPen U-100 Insulin) 100 unit/mL (3 mL) pen Inject under the skin.      blood-glucose sensor (DEXCOM G6 SENSOR MISC) as directed . .      Dexcom G4 platinum  (Dexcom G6 ) misc as directed . .      Dexcom G4 platinum transmitter (Dexcom G6 Transmitter) device as  directed . .      [DISCONTINUED] acetaminophen (TylenoL) 325 mg tablet Take 1 tablet (325 mg) by mouth every 6 hours if needed.      [DISCONTINUED] chlorhexidine (Peridex) 0.12 % solution Use 15 mL in the mouth or throat 2 times a day.  RINSE WITH 1/2 OUNCE FOR THIRTY SECONDS TWICE DAILY.      [DISCONTINUED] cholecalciferol (Vitamin D3) 25 MCG (1000 UT) tablet Take 1 tablet (25 mcg) by mouth once daily.      [DISCONTINUED] fluticasone (Flonase) 50 mcg/actuation nasal spray Administer 2 sprays into affected nostril(s) once daily.      [DISCONTINUED] insulin NPH, Isophane, (NovoLIN N NPH U-100 Insulin) 100 unit/mL injection Inject under the skin.      [DISCONTINUED] omeprazole (PriLOSEC) 40 mg DR capsule Take 1 capsule (40 mg) by mouth once daily.      [DISCONTINUED] sertraline (Zoloft) 50 mg tablet Take 2 tablets (100 mg) by mouth once daily.      [DISCONTINUED] triamcinolone (Kenalog) 0.1 % oral paste Take 1 Application by mouth 3 times a day.       No current facility-administered medications on file prior to visit.     Immunization History   Administered Date(s) Administered    DT (pediatric) 05/29/2007    Influenza, seasonal, injectable 10/29/2010, 11/25/2011    Pfizer Purple Cap SARS-CoV-2 03/31/2021, 04/24/2021, 11/04/2021    Pneumococcal conjugate vaccine, 13-valent (PREVNAR 13) 05/23/2018    Pneumococcal conjugate vaccine, 20-valent (PREVNAR 20) 07/12/2023    Pneumococcal polysaccharide vaccine, 23-valent, age 2 years and older (PNEUMOVAX 23) 10/29/2006    Tdap vaccine, age 7 year and older (BOOSTRIX) 05/23/2018     Patient's medical history was reviewed and updated either before or during this encounter.       Pablo Montoya MD

## 2023-11-15 ENCOUNTER — PATIENT OUTREACH (OUTPATIENT)
Dept: PRIMARY CARE | Facility: CLINIC | Age: 73
End: 2023-11-15
Payer: MEDICARE

## 2023-11-22 ENCOUNTER — PATIENT OUTREACH (OUTPATIENT)
Dept: PRIMARY CARE | Facility: CLINIC | Age: 73
End: 2023-11-22
Payer: MEDICARE

## 2023-11-22 NOTE — PROGRESS NOTES
Attempted to reach patient- unsuccessful attempts to reach patient- messages left and no return calls back x 5 attempts in Oct and Nov.

## 2023-11-28 ENCOUNTER — PATIENT OUTREACH (OUTPATIENT)
Dept: PRIMARY CARE | Facility: CLINIC | Age: 73
End: 2023-11-28
Payer: MEDICARE

## 2023-11-28 DIAGNOSIS — F41.9 ANXIETY DISORDER, UNSPECIFIED TYPE: ICD-10-CM

## 2023-11-28 DIAGNOSIS — E11.9 TYPE 2 DIABETES MELLITUS WITHOUT COMPLICATION, WITHOUT LONG-TERM CURRENT USE OF INSULIN (MULTI): ICD-10-CM

## 2023-11-28 DIAGNOSIS — I10 ESSENTIAL (PRIMARY) HYPERTENSION: ICD-10-CM

## 2023-11-28 NOTE — PROGRESS NOTES
Attempted to reach patient/spouse- Was only able to leave a message.   If no return call back- will send letter- if no response to letter- will disenroll.

## 2023-12-08 ENCOUNTER — HOSPITAL ENCOUNTER (OUTPATIENT)
Dept: RADIOLOGY | Facility: HOSPITAL | Age: 73
Discharge: HOME | End: 2023-12-08
Payer: MEDICARE

## 2023-12-08 DIAGNOSIS — N18.32 STAGE 3B CHRONIC KIDNEY DISEASE (MULTI): ICD-10-CM

## 2023-12-08 PROCEDURE — 76770 US EXAM ABDO BACK WALL COMP: CPT

## 2023-12-13 DIAGNOSIS — F32.A DEPRESSION, UNSPECIFIED DEPRESSION TYPE: Primary | ICD-10-CM

## 2023-12-13 RX ORDER — IMIPRAMINE HYDROCHLORIDE 25 MG/1
25 TABLET, FILM COATED ORAL NIGHTLY
Qty: 90 TABLET | Refills: 1 | Status: SHIPPED | OUTPATIENT
Start: 2023-12-13 | End: 2024-06-10 | Stop reason: SDUPTHER

## 2024-01-25 ENCOUNTER — LAB (OUTPATIENT)
Dept: LAB | Facility: LAB | Age: 74
End: 2024-01-25
Payer: MEDICARE

## 2024-01-25 DIAGNOSIS — D64.9 ANEMIA, UNSPECIFIED TYPE: ICD-10-CM

## 2024-01-25 DIAGNOSIS — R73.9 HYPERGLYCEMIA: ICD-10-CM

## 2024-01-25 DIAGNOSIS — Z01.89 ENCOUNTER FOR ROUTINE LABORATORY TESTING: ICD-10-CM

## 2024-01-25 DIAGNOSIS — N18.32 STAGE 3B CHRONIC KIDNEY DISEASE (MULTI): ICD-10-CM

## 2024-01-25 DIAGNOSIS — E78.2 MIXED HYPERLIPIDEMIA: ICD-10-CM

## 2024-01-25 DIAGNOSIS — I10 ESSENTIAL (PRIMARY) HYPERTENSION: ICD-10-CM

## 2024-01-25 LAB
ALBUMIN SERPL-MCNC: 4.2 G/DL (ref 3.5–5)
ALP BLD-CCNC: 158 U/L (ref 35–125)
ALT SERPL-CCNC: 9 U/L (ref 5–40)
ANION GAP SERPL CALC-SCNC: 11 MMOL/L
AST SERPL-CCNC: 11 U/L (ref 5–40)
BASOPHILS # BLD AUTO: 0.14 X10*3/UL (ref 0–0.1)
BASOPHILS NFR BLD AUTO: 1.6 %
BILIRUB SERPL-MCNC: 0.3 MG/DL (ref 0.1–1.2)
BUN SERPL-MCNC: 25 MG/DL (ref 8–25)
CALCIUM SERPL-MCNC: 9.6 MG/DL (ref 8.5–10.4)
CHLORIDE SERPL-SCNC: 100 MMOL/L (ref 97–107)
CHOLEST SERPL-MCNC: 167 MG/DL (ref 133–200)
CHOLEST/HDLC SERPL: 2.3 {RATIO}
CO2 SERPL-SCNC: 30 MMOL/L (ref 24–31)
CREAT SERPL-MCNC: 1.6 MG/DL (ref 0.4–1.6)
EGFRCR SERPLBLD CKD-EPI 2021: 34 ML/MIN/1.73M*2
EOSINOPHIL # BLD AUTO: 0.37 X10*3/UL (ref 0–0.4)
EOSINOPHIL NFR BLD AUTO: 4.1 %
ERYTHROCYTE [DISTWIDTH] IN BLOOD BY AUTOMATED COUNT: 13.3 % (ref 11.5–14.5)
EST. AVERAGE GLUCOSE BLD GHB EST-MCNC: 151 MG/DL
GLUCOSE SERPL-MCNC: 148 MG/DL (ref 65–99)
HBA1C MFR BLD: 6.9 %
HCT VFR BLD AUTO: 38.7 % (ref 36–46)
HDLC SERPL-MCNC: 74 MG/DL
HGB BLD-MCNC: 12 G/DL (ref 12–16)
IMM GRANULOCYTES # BLD AUTO: 0.05 X10*3/UL (ref 0–0.5)
IMM GRANULOCYTES NFR BLD AUTO: 0.6 % (ref 0–0.9)
LDLC SERPL CALC-MCNC: 68 MG/DL (ref 65–130)
LYMPHOCYTES # BLD AUTO: 2.27 X10*3/UL (ref 0.8–3)
LYMPHOCYTES NFR BLD AUTO: 25.4 %
MCH RBC QN AUTO: 26.9 PG (ref 26–34)
MCHC RBC AUTO-ENTMCNC: 31 G/DL (ref 32–36)
MCV RBC AUTO: 87 FL (ref 80–100)
MONOCYTES # BLD AUTO: 0.8 X10*3/UL (ref 0.05–0.8)
MONOCYTES NFR BLD AUTO: 8.9 %
NEUTROPHILS # BLD AUTO: 5.32 X10*3/UL (ref 1.6–5.5)
NEUTROPHILS NFR BLD AUTO: 59.4 %
NRBC BLD-RTO: 0 /100 WBCS (ref 0–0)
PLATELET # BLD AUTO: 405 X10*3/UL (ref 150–450)
POTASSIUM SERPL-SCNC: 4.3 MMOL/L (ref 3.4–5.1)
PROT SERPL-MCNC: 6.8 G/DL (ref 5.9–7.9)
RBC # BLD AUTO: 4.46 X10*6/UL (ref 4–5.2)
SODIUM SERPL-SCNC: 141 MMOL/L (ref 133–145)
TRIGL SERPL-MCNC: 127 MG/DL (ref 40–150)
WBC # BLD AUTO: 9 X10*3/UL (ref 4.4–11.3)

## 2024-01-25 PROCEDURE — 85025 COMPLETE CBC W/AUTO DIFF WBC: CPT

## 2024-01-25 PROCEDURE — 80061 LIPID PANEL: CPT

## 2024-01-25 PROCEDURE — 80053 COMPREHEN METABOLIC PANEL: CPT

## 2024-01-25 PROCEDURE — 36415 COLL VENOUS BLD VENIPUNCTURE: CPT

## 2024-01-25 PROCEDURE — 83036 HEMOGLOBIN GLYCOSYLATED A1C: CPT

## 2024-02-02 ENCOUNTER — CLINICAL SUPPORT (OUTPATIENT)
Dept: AUDIOLOGY | Facility: CLINIC | Age: 74
End: 2024-02-02

## 2024-02-02 DIAGNOSIS — H90.3 SENSORINEURAL HEARING LOSS, ASYMMETRICAL: Primary | ICD-10-CM

## 2024-02-02 PROCEDURE — HRANC PR HEARING AID NO CHARGE: Performed by: AUDIOLOGIST

## 2024-02-02 NOTE — PROGRESS NOTES
AUDIOLOGY ADULT HEARING AID CHECK    Name:  Daniela Epstein  :  1950  Age:  73 y.o.  Date of Service:  2024    Reason for visit: Ms. Epstein is seen in the clinic today for a hearing aid check appointment. Patient complains that the CROS transmitter is not working and she is not hearing things on her right side.    HISTORY  Patient wears binaural 2023 Phonak Audeo BiCROS (Audeo P90-R in the left ear and Audeo CROS P-R in the right ear) rechargeable  in the canal ([place]) hearing aids in silver gray with 2C  in the right ear, 1S  in the left ear, small open domes, and no retention lines (right CROS serial number [id number], left serial number [id number]). Repair and loss/damage warranties end 2026. One year in-office service plan ends 2024. Not connected to iPhone. New user. [employer].     RESULTS  Listening check confirmed that the CROS transmitter is functioning with the hearing aid. Devices were connected to the software via [employer]. Increased the CROS Balance to favor the CROS transmitter. Increased overturning in feedback manager.    IMPRESSIONS  Hearing aids were returned to the patient and she expressed satisfaction.    RECOMMENDATIONS  - Continued hearing aid use.  - Appointment in  for annual audiologic evaluation and hearing aid check/adjustment. Contact the office for a sooner appointment if an acute change is noted or if questions/problems arise.  - Follow up with otolaryngology as planned.  - Follow-up with medical care team as planned.    PATIENT EDUCATION  Discussed results, impressions and recommendations with the patient. Questions were addressed and the patient was encouraged to contact our office should concerns arise.    Time for this encounter: 9829-5657    Abner Mack, CCC-A  Licensed Audiologist

## 2024-02-13 PROBLEM — M79.642 PAIN IN LEFT HAND: Status: RESOLVED | Noted: 2023-02-06 | Resolved: 2024-02-13

## 2024-02-13 PROBLEM — E78.49 OTHER HYPERLIPIDEMIA: Status: ACTIVE | Noted: 2022-02-15

## 2024-02-13 PROBLEM — S06.5XAA: Status: ACTIVE | Noted: 2024-02-13

## 2024-02-13 PROBLEM — Z82.49 FAMILY HISTORY OF ISCHEMIC HEART DISEASE AND OTHER DISEASES OF THE CIRCULATORY SYSTEM: Status: ACTIVE | Noted: 2023-03-06

## 2024-02-13 PROBLEM — Z96.1 PSEUDOPHAKIA, BOTH EYES: Status: ACTIVE | Noted: 2019-06-28

## 2024-02-13 PROBLEM — Z20.822 CONTACT WITH AND (SUSPECTED) EXPOSURE TO COVID-19: Status: ACTIVE | Noted: 2023-02-06

## 2024-02-13 PROBLEM — S32.9XXA: Status: ACTIVE | Noted: 2023-02-08

## 2024-02-13 PROBLEM — M97.11XA PERIPROSTHETIC FRACTURE AROUND INTERNAL PROSTHETIC RIGHT KNEE JOINT: Status: ACTIVE | Noted: 2022-02-14

## 2024-02-13 PROBLEM — H92.09 OTALGIA: Status: ACTIVE | Noted: 2024-02-13

## 2024-02-13 PROBLEM — S06.5XAA TRAUMATIC SUBDURAL HEMORRHAGE WITH LOSS OF CONSCIOUSNESS STATUS UNKNOWN, INITIAL ENCOUNTER (MULTI): Status: ACTIVE | Noted: 2023-03-23

## 2024-02-13 PROBLEM — R06.83 SNORING: Status: ACTIVE | Noted: 2024-02-13

## 2024-02-13 PROBLEM — I62.00 SUBDURAL HEMORRHAGE (MULTI): Status: ACTIVE | Noted: 2024-02-13

## 2024-02-13 PROBLEM — S69.92XA UNSPECIFIED INJURY OF LEFT WRIST, HAND AND FINGER(S), INITIAL ENCOUNTER: Status: ACTIVE | Noted: 2023-02-06

## 2024-02-13 PROBLEM — R93.89 ABNORMAL COMPUTED TOMOGRAPHY SCAN: Status: ACTIVE | Noted: 2024-02-13

## 2024-02-13 PROBLEM — Z95.9 PRESENCE OF CARDIAC AND VASCULAR IMPLANT AND GRAFT, UNSPECIFIED: Status: ACTIVE | Noted: 2024-02-13

## 2024-02-13 PROBLEM — E66.9 OBESITY, UNSPECIFIED: Status: ACTIVE | Noted: 2023-03-27

## 2024-02-13 PROBLEM — H53.2 DIPLOPIA: Status: ACTIVE | Noted: 2023-08-30

## 2024-02-13 PROBLEM — K59.09 OTHER CONSTIPATION: Status: ACTIVE | Noted: 2022-02-17

## 2024-02-13 PROBLEM — I12.9 CHRONIC KIDNEY DISEASE DUE TO HYPERTENSION: Status: ACTIVE | Noted: 2022-02-15

## 2024-02-13 PROBLEM — E11.22 TYPE 2 DIABETES MELLITUS WITH DIABETIC CHRONIC KIDNEY DISEASE (MULTI): Status: ACTIVE | Noted: 2023-03-27

## 2024-02-13 PROBLEM — Z88.1 ALLERGY STATUS TO OTHER ANTIBIOTIC AGENTS: Status: ACTIVE | Noted: 2023-03-27

## 2024-02-13 PROBLEM — B96.20 UNSPECIFIED ESCHERICHIA COLI (E. COLI) AS THE CAUSE OF DISEASES CLASSIFIED ELSEWHERE: Status: ACTIVE | Noted: 2023-03-27

## 2024-02-13 PROBLEM — S92.412A DISPLACED FRACTURE OF PROXIMAL PHALANX OF LEFT GREAT TOE, INITIAL ENCOUNTER FOR CLOSED FRACTURE: Status: ACTIVE | Noted: 2023-02-08

## 2024-02-13 PROBLEM — S30.3XXA CONTUSION OF ANUS: Status: ACTIVE | Noted: 2024-02-13

## 2024-02-13 PROBLEM — R53.1 WEAKNESS: Status: RESOLVED | Noted: 2023-03-27 | Resolved: 2024-02-13

## 2024-02-13 PROBLEM — I63.9 CEREBELLAR INFARCTION (MULTI): Status: ACTIVE | Noted: 2023-07-17

## 2024-02-13 PROBLEM — E11.9 TYPE 2 DIABETES MELLITUS, WITHOUT LONG-TERM CURRENT USE OF INSULIN (MULTI): Status: ACTIVE | Noted: 2022-02-15

## 2024-02-13 PROBLEM — N17.9 ACUTE KIDNEY FAILURE, UNSPECIFIED (CMS-HCC): Status: ACTIVE | Noted: 2023-03-27

## 2024-02-13 PROBLEM — H69.90 DYSFUNCTION OF EUSTACHIAN TUBE: Status: ACTIVE | Noted: 2023-08-30

## 2024-02-13 PROBLEM — M25.561 PAIN IN RIGHT KNEE: Status: RESOLVED | Noted: 2023-01-16 | Resolved: 2024-02-13

## 2024-02-13 PROBLEM — Z86.2 HISTORY OF ANEMIA: Status: ACTIVE | Noted: 2024-02-13

## 2024-02-13 PROBLEM — M25.661 STIFFNESS OF RIGHT KNEE, NOT ELSEWHERE CLASSIFIED: Status: ACTIVE | Noted: 2023-01-16

## 2024-02-13 PROBLEM — S06.A0XA: Status: ACTIVE | Noted: 2023-03-27

## 2024-02-13 PROBLEM — Z79.84 LONG TERM (CURRENT) USE OF ORAL HYPOGLYCEMIC DRUGS: Status: ACTIVE | Noted: 2023-03-06

## 2024-02-13 PROBLEM — R26.89 MULTIFACTORIAL GAIT DISORDER: Status: ACTIVE | Noted: 2023-08-30

## 2024-02-13 PROBLEM — E11.42 DIABETIC PERIPHERAL NEUROPATHY (MULTI): Status: ACTIVE | Noted: 2024-02-13

## 2024-02-13 PROBLEM — Z86.59 HISTORY OF MENTAL DISORDER: Status: ACTIVE | Noted: 2024-02-13

## 2024-02-13 PROBLEM — G44.309 POST-TRAUMATIC HEADACHE, UNSPECIFIED, NOT INTRACTABLE: Status: ACTIVE | Noted: 2024-02-13

## 2024-02-13 PROBLEM — M79.675 PAIN IN LEFT TOE(S): Status: RESOLVED | Noted: 2023-02-06 | Resolved: 2024-02-13

## 2024-02-13 PROBLEM — R73.09 BLOOD GLUCOSE ABNORMAL: Status: ACTIVE | Noted: 2024-02-13

## 2024-02-13 PROBLEM — E66.9 OBESITY WITH BODY MASS INDEX 30 OR GREATER: Status: ACTIVE | Noted: 2023-03-27

## 2024-02-13 PROBLEM — H04.563 PUNCTAL STENOSIS, ACQUIRED, BILATERAL: Status: ACTIVE | Noted: 2019-05-16

## 2024-02-13 PROBLEM — Z86.69 HISTORY OF CATARACT: Status: ACTIVE | Noted: 2024-02-13

## 2024-02-13 PROBLEM — H92.01 RIGHT EAR PAIN: Status: ACTIVE | Noted: 2023-08-30

## 2024-02-13 NOTE — PROGRESS NOTES
[facility]: [facility]  PROGRESS NOTE      Daniela Epstein is a 73 y.o. female that is presenting today for Follow-up.    Assessment/Plan   Diagnoses and all orders for this visit:  Essential (primary) hypertension  -     Comprehensive Metabolic Panel; Future  -     lisinopril 20 mg tablet; Take 1 tablet (20 mg) by mouth once daily.  Type 2 diabetes mellitus without complication, without long-term current use of insulin (CMS/[facility])  -     dapagliflozin propanediol (Farxiga) 5 mg; Take 1 tablet (5 mg) by mouth once daily.  Anxiety disorder, unspecified type  Mixed hyperlipidemia  Iron deficiency anemia, unspecified iron deficiency anemia type  -     CBC and Auto Differential; Future  Depression, unspecified depression type  Overactive bladder  Primary osteoarthritis, unspecified site  Vitamin D deficiency  Stage 3b chronic kidney disease (CMS/[facility])  Encounter for routine laboratory testing  -     Hemoglobin A1C; Future  Type 2 diabetes mellitus with stage 3b chronic kidney disease, without long-term current use of insulin (CMS/[facility])  -     Hemoglobin A1C; Future  Other orders  -     Follow Up In Primary Care - Established  -     Follow Up In Primary Care - Established; Future  We discussed her overall situation today.  First of all her blood pressure is not under the best control we are going to increase her lisinopril today.  Secondly we really exploring her diabetes program.  Her hemoglobin A1c is under great control right now and is under 7% however I really think she should be on an SGLT2 inhibitor given her concomitant chronic kidney disease and her diabetes.  I am exploring that with the current pharmacist.  Otherwise I would not make further changes in her medications and would continue to see her every 3 months  Subjective   Here for follow up appts - pt  overall is doing ok - seeing a headache specialist now who tried injections ; sees dr johnston for neuropathy as well .       Review of  Systems   Eyes: Negative.    Respiratory: Negative.     Cardiovascular:  Positive for palpitations.        Has loop recorder on - seeing dr bonlila.   Gastrointestinal: Negative.         Occassional loose stools - not persistent   Genitourinary: Negative.    Musculoskeletal:         Occassional back pain      Objective   Vitals:    02/14/24 0825   BP: 150/90   Pulse: 67   Temp: 35.6 °C (96.1 °F)   SpO2: 99%      Body mass index is 34.72 kg/m².  Physical Exam  HENT:      Head: Normocephalic.      Nose: Nose normal.   Cardiovascular:      Rate and Rhythm: Normal rate and regular rhythm.      Pulses: Normal pulses.      Heart sounds: Normal heart sounds.   Pulmonary:      Effort: Pulmonary effort is normal.      Breath sounds: Normal breath sounds.   Abdominal:      General: Abdomen is flat.   Musculoskeletal:         General: Normal range of motion.      Cervical back: Normal range of motion and neck supple.   Skin:     General: Skin is warm.   Neurological:      Mental Status: She is alert.       Diagnostic Results   Lab Results   Component Value Date    GLUCOSE 148 (H) 01/25/2024    CALCIUM 9.6 01/25/2024     01/25/2024    K 4.3 01/25/2024    CO2 30 01/25/2024     01/25/2024    BUN 25 01/25/2024    CREATININE 1.60 01/25/2024     Lab Results   Component Value Date    ALT 9 01/25/2024    AST 11 01/25/2024    GGT 18 09/03/2018    ALKPHOS 158 (H) 01/25/2024    BILITOT 0.3 01/25/2024     Lab Results   Component Value Date    WBC 9.0 01/25/2024    HGB 12.0 01/25/2024    HCT 38.7 01/25/2024    MCV 87 01/25/2024     01/25/2024     Lab Results   Component Value Date    CHOL 167 01/25/2024    CHOL 148 07/06/2023    CHOL 145 07/11/2022     Lab Results   Component Value Date    HDL 74.0 01/25/2024    HDL 65 07/06/2023    HDL 65 07/11/2022     Lab Results   Component Value Date    LDLCALC 68 01/25/2024    LDLCALC 59 (L) 07/06/2023    LDLCALC 51 (L) 07/11/2022     Lab Results   Component Value Date    TRIG  "127 01/25/2024    TRIG 121 07/06/2023    TRIG 145 07/11/2022     No components found for: \"CHOLHDL\"  Lab Results   Component Value Date    HGBA1C 6.9 (H) 01/25/2024     Other labs not included in the list above were reviewed either before or during this encounter.    History    Past Medical History:   Diagnosis Date    Acoustic neuroma (CMS/[facility]) 08/30/2023    Acute lower urinary tract infection 08/30/2023    Affective psychosis, bipolar (CMS/[facility]) 08/30/2023    Age-related nuclear cataract, left eye 10/16/2015    Age-related nuclear cataract of left eye    Age-related nuclear cataract, right eye 10/16/2015    Age-related nuclear cataract of right eye    Anxiety disorder 08/30/2023    Bilateral dry eyes 08/30/2023    Bipolar disorder (CMS/[facility]) 08/30/2023    Breast lump 08/30/2023    Cardiac device in situ 08/30/2023    Cerebrovascular accident (CMS/HCC) 08/30/2023    Depression 08/30/2023    Depression, unspecified 05/20/2014    Depression    Diabetes mellitus (CMS/[facility]) 08/30/2023    Diabetes mellitus, type 2 (CMS/[facility]) 08/30/2023    Dry eye syndrome of unspecified lacrimal gland 06/25/2015    Dry eye syndrome    Dysfunction of right eustachian tube 08/30/2023    Dyslipidemia 08/30/2023    Dysphagia 08/30/2023    Essential (primary) hypertension 12/15/2013    Hypertension    Essential (primary) hypertension 08/30/2023    Fibrocystic breast changes 08/30/2023    Gastroesophageal reflux disease 08/30/2023    Hazy vision 08/30/2023    Hypokalemia 08/30/2023    Mastodynia 08/30/2023    Mental disorder 08/30/2023    Mixed hyperlipidemia 08/30/2023    Myopia, bilateral 10/16/2015    High myopia, both eyes    Nausea & vomiting 08/30/2023    Nose injury 08/30/2023    Oral mucosal lesion 08/30/2023    Osteoarthritis 08/30/2023    Other disorders affecting eyelid function 06/25/2015    Excessive involuntary blinking    Overactive bladder 08/30/2023    Pain in left hand 02/06/2023    Personal history of diseases of the blood and " blood-forming organs and certain disorders involving the immune mechanism     History of anemia    Personal history of other diseases of the digestive system     History of esophageal reflux    Personal history of other diseases of the nervous system and sense organs     History of cataract    Personal history of other diseases of urinary system     History of bladder problems    Personal history of other mental and behavioral disorders     History of mental disorder    Pure hypercholesterolemia, unspecified 05/20/2014    High cholesterol    Rhinitis 08/30/2023    S/P knee replacement 08/30/2023    Snoring     Snoring    Solitary cyst of breast 08/30/2023    Swelling, mass, or lump on face 08/30/2023    Syncope 08/30/2023    TMJ (temporomandibular joint syndrome) 08/30/2023    Type 2 diabetes mellitus without complications (CMS/HCC) 06/25/2015    Diabetes mellitus    Unsteady gait 08/30/2023    Vitamin D deficiency 08/30/2023    Weakness 03/27/2023     Past Surgical History:   Procedure Laterality Date    ANKLE SURGERY  1998    injury    APPENDECTOMY  1972    BLEPHAROPLASTY Bilateral 2010    BREAST BIOPSY  2011    BREAST LUMPECTOMY Right 2011    atypical hyperplasia    CARDIAC SURGERY  03/06/2023    implant cardiac event recorder for recurrent syncope/ Dr. Fowler    CARPAL TUNNEL RELEASE Left 2010    Neuroplasty Decompression Median Nerve At Carpal Tunnel    CARPAL TUNNEL RELEASE Right 2010    CHOLECYSTECTOMY  1979    COLONOSCOPY  2007    COLONOSCOPY  2013    COLONOSCOPY  2014    COLONOSCOPY  11/06/2020    CRANIOTOMY Right 2006    acoustic neuroma    CT ANGIO NECK  03/22/2023    CT NECK ANGIO W AND WO IV CONTRAST [facility]    CT HEAD ANGIO W AND WO IV CONTRAST  03/22/2023    CT HEAD ANGIO W AND WO IV CONTRAST [facility]    DILATION AND CURETTAGE OF UTERUS  1971    DILATION AND CURETTAGE OF UTERUS  2013    ESOPHAGOGASTRODUODENOSCOPY  2007    ESOPHAGOGASTRODUODENOSCOPY  2010    ESOPHAGOGASTRODUODENOSCOPY  2020     HEMORRHOID SURGERY  1990    KNEE SURGERY Right 2008    scar tissue    MOUTH SURGERY Right 2017    right buccal lesion    MR HEAD ANGIO WO IV CONTRAST  01/18/2015    MR HEAD ANGIO WO IV CONTRAST [facility]    MR HEAD ANGIO WO IV CONTRAST  05/08/2016    MR HEAD ANGIO WO IV CONTRAST [facility]    MR HEAD ANGIO WO IV CONTRAST  02/28/2017    MR HEAD ANGIO WO IV CONTRAST [facility]    MR NECK ANGIO WO IV CONTRAST  01/18/2015    MR NECK ANGIO WO IV CONTRAST [facility]    NASAL SINUS SURGERY  1985    ORIF FEMUR FRACTURE Right 2022    OVARIAN CYST REMOVAL  1972    SEPTOPLASTY  05/20/2014    Septoplasty    TONSILLECTOMY      TOTAL KNEE ARTHROPLASTY Right 2007    TOTAL KNEE ARTHROPLASTY Left 2011    TUBAL LIGATION  1983    URETHRAL SLING  2013     Family History   Problem Relation Name Age of Onset    Arthritis Mother      Breast cancer Mother      Diabetes Mother      Hypertension Mother      Heart failure Father      Heart disease Father      Arthritis Other SIBLINGS     Diabetes Other SIBLINGS     Hypertension Other SIBLINGS      Social History     Socioeconomic History    Marital status:      Spouse name: Not on file    Number of children: Not on file    Years of education: Not on file    Highest education level: Not on file   Occupational History    Not on file   Tobacco Use    Smoking status: Never    Smokeless tobacco: Never   Substance and Sexual Activity    Alcohol use: Never    Drug use: Never    Sexual activity: Not on file   Other Topics Concern    Not on file   Social History Narrative    Not on file     Social Determinants of Health     Financial Resource Strain: Not on file   Food Insecurity: Not on file   Transportation Needs: Not on file   Physical Activity: Not on file   Stress: Not on file   Social Connections: Not on file   Intimate Partner Violence: Not on file   Housing Stability: Not on file     Allergies   Allergen Reactions    Other Other     TARTICK  DYSKENESIA    Phenothiazines Hives, Shortness of breath, Other and Unknown     Slurred speech    Erythromycin Hives, Unknown and Rash    Haloperidol Unknown     Current Outpatient Medications on File Prior to Visit   Medication Sig Dispense Refill    acetaminophen (Tylenol) 500 mg tablet Take 2 tablets (1,000 mg) by mouth every 6 hours if needed.      atorvastatin (Lipitor) 40 mg tablet Take 1 tablet (40 mg) by mouth once daily.      blood sugar diagnostic (Truetest Test Strips) strip 4 times a day.      blood sugar diagnostic strip Inject under the skin once daily.      blood-glucose sensor (DEXCOM G6 SENSOR MISC) as directed . .      cholecalciferol (Vitamin D3) 25 MCG (1000 UT) tablet Take 2 tablets (50 mcg) by mouth once daily.      clobetasol (Temovate) 0.05 % cream Apply 1 Application topically 2 times a day.      cyanocobalamin (Vitamin B-12) 1,000 mcg tablet Take 1 tablet (1,000 mcg) by mouth once daily.      Dexcom G4 platinum  (Dexcom G6 ) misc as directed . .      Dexcom G4 platinum transmitter (Dexcom G6 Transmitter) device as directed . .      docusate sodium (Colace) 100 mg capsule Take 1 capsule (100 mg) by mouth once daily as needed.      ferrous sulfate 325 (65 Fe) MG EC tablet Take 1 tablet by mouth 1 (one) time per week.      fluticasone (Flonase) 50 mcg/actuation nasal spray Administer 1 spray into each nostril once daily.      hydroCHLOROthiazide (HYDRODiuril) 12.5 mg tablet Take 1 tablet (12.5 mg) by mouth once daily in the morning.      HYDROcodone-acetaminophen (Xodol) 7.5-300 mg tablet tablet Take by mouth.      imipramine (Tofranil) 25 mg tablet TAKE 1 TABLET BY MOUTH ONCE A DAY AT BEDTIME 90 tablet 1    lamoTRIgine (LaMICtal) 200 mg tablet Take 1 tablet (200 mg) by mouth once daily. ODT?      loperamide (Imodium A-D) 2 mg tablet Take 1 tablet (2 mg) by mouth 4 times a day as needed.      loratadine (Claritin) 10 mg tablet Take 1 tablet (10 mg) by mouth if needed.       metFORMIN (Glucophage) 500 mg tablet Take 2 tablets (1,000 mg) by mouth 2 times a day.      omeprazole (PriLOSEC) 20 mg DR capsule Take 1 capsule (20 mg) by mouth once daily.      pen needle, diabetic (BD ULTRA-FINE MINI PEN NEEDLE MISC) As directed dx: E11.9 bid      polyethylene glycol (Miralax) 17 gram packet Take 17 g by mouth once daily. MIX WITH 8 OZ FLUID      sertraline (Zoloft) 50 mg tablet Take 1 tablet (50 mg) by mouth once daily.      tiZANidine (Zanaflex) 4 mg tablet Take 1 tablet (4 mg) by mouth 2 times a day as needed (HEAD OR NECK PAIN).      traMADol (Ultram) 50 mg tablet Take 1 tablet (50 mg) by mouth every 6 hours if needed.      [DISCONTINUED] glipiZIDE (Glucotrol) 5 mg tablet Take 1 tablet (5 mg) by mouth 2 times a day.      [DISCONTINUED] lisinopril 10 mg tablet Take by mouth once daily.      [DISCONTINUED] mirtazapine (Remeron) 15 mg tablet Take 1 tablet (15 mg) by mouth once daily at bedtime.      [DISCONTINUED] triamcinolone (Kenalog) 0.1 % cream Apply 1 Application topically 2 times a week.       No current facility-administered medications on file prior to visit.     Immunization History   Administered Date(s) Administered    DT (pediatric) 05/29/2007    Influenza, seasonal, injectable 10/29/2010, 11/25/2011    Pfizer Purple Cap SARS-CoV-2 03/31/2021, 04/24/2021, 11/04/2021    Pneumococcal conjugate vaccine, 13-valent (PREVNAR 13) 05/23/2018    Pneumococcal conjugate vaccine, 20-valent (PREVNAR 20) 07/12/2023    Pneumococcal polysaccharide vaccine, 23-valent, age 2 years and older (PNEUMOVAX 23) 10/29/2006    Tdap vaccine, age 7 year and older (BOOSTRIX, ADACEL) 05/23/2018     Patient's medical history was reviewed and updated either before or during this encounter.       Pablo Montoya MD

## 2024-02-14 ENCOUNTER — OFFICE VISIT (OUTPATIENT)
Dept: PRIMARY CARE | Facility: CLINIC | Age: 74
End: 2024-02-14
Payer: MEDICARE

## 2024-02-14 VITALS
DIASTOLIC BLOOD PRESSURE: 90 MMHG | TEMPERATURE: 96.1 F | SYSTOLIC BLOOD PRESSURE: 150 MMHG | HEIGHT: 63 IN | WEIGHT: 196 LBS | BODY MASS INDEX: 34.73 KG/M2 | OXYGEN SATURATION: 99 % | HEART RATE: 67 BPM

## 2024-02-14 DIAGNOSIS — N32.81 OVERACTIVE BLADDER: ICD-10-CM

## 2024-02-14 DIAGNOSIS — N18.32 STAGE 3B CHRONIC KIDNEY DISEASE (MULTI): ICD-10-CM

## 2024-02-14 DIAGNOSIS — F32.A DEPRESSION, UNSPECIFIED DEPRESSION TYPE: ICD-10-CM

## 2024-02-14 DIAGNOSIS — E11.9 TYPE 2 DIABETES MELLITUS WITHOUT COMPLICATION, WITHOUT LONG-TERM CURRENT USE OF INSULIN (MULTI): ICD-10-CM

## 2024-02-14 DIAGNOSIS — F41.9 ANXIETY DISORDER, UNSPECIFIED TYPE: ICD-10-CM

## 2024-02-14 DIAGNOSIS — I10 ESSENTIAL (PRIMARY) HYPERTENSION: Primary | ICD-10-CM

## 2024-02-14 DIAGNOSIS — E78.2 MIXED HYPERLIPIDEMIA: ICD-10-CM

## 2024-02-14 DIAGNOSIS — E55.9 VITAMIN D DEFICIENCY: ICD-10-CM

## 2024-02-14 DIAGNOSIS — Z01.89 ENCOUNTER FOR ROUTINE LABORATORY TESTING: ICD-10-CM

## 2024-02-14 DIAGNOSIS — M19.91 PRIMARY OSTEOARTHRITIS, UNSPECIFIED SITE: ICD-10-CM

## 2024-02-14 DIAGNOSIS — E11.22 TYPE 2 DIABETES MELLITUS WITH STAGE 3B CHRONIC KIDNEY DISEASE, WITHOUT LONG-TERM CURRENT USE OF INSULIN (MULTI): ICD-10-CM

## 2024-02-14 DIAGNOSIS — D50.9 IRON DEFICIENCY ANEMIA, UNSPECIFIED IRON DEFICIENCY ANEMIA TYPE: ICD-10-CM

## 2024-02-14 DIAGNOSIS — N18.32 TYPE 2 DIABETES MELLITUS WITH STAGE 3B CHRONIC KIDNEY DISEASE, WITHOUT LONG-TERM CURRENT USE OF INSULIN (MULTI): ICD-10-CM

## 2024-02-14 PROCEDURE — 4010F ACE/ARB THERAPY RXD/TAKEN: CPT | Performed by: INTERNAL MEDICINE

## 2024-02-14 PROCEDURE — 99214 OFFICE O/P EST MOD 30 MIN: CPT | Performed by: INTERNAL MEDICINE

## 2024-02-14 PROCEDURE — 3080F DIAST BP >= 90 MM HG: CPT | Performed by: INTERNAL MEDICINE

## 2024-02-14 PROCEDURE — 3044F HG A1C LEVEL LT 7.0%: CPT | Performed by: INTERNAL MEDICINE

## 2024-02-14 PROCEDURE — 3048F LDL-C <100 MG/DL: CPT | Performed by: INTERNAL MEDICINE

## 2024-02-14 PROCEDURE — 3077F SYST BP >= 140 MM HG: CPT | Performed by: INTERNAL MEDICINE

## 2024-02-14 PROCEDURE — 1125F AMNT PAIN NOTED PAIN PRSNT: CPT | Performed by: INTERNAL MEDICINE

## 2024-02-14 PROCEDURE — 1159F MED LIST DOCD IN RCRD: CPT | Performed by: INTERNAL MEDICINE

## 2024-02-14 PROCEDURE — 1036F TOBACCO NON-USER: CPT | Performed by: INTERNAL MEDICINE

## 2024-02-14 RX ORDER — LISINOPRIL 20 MG/1
20 TABLET ORAL DAILY
Qty: 90 TABLET | Refills: 3 | Status: SHIPPED | OUTPATIENT
Start: 2024-02-14 | End: 2025-02-13

## 2024-02-14 RX ORDER — DAPAGLIFLOZIN 5 MG/1
5 TABLET, FILM COATED ORAL DAILY
Qty: 30 TABLET | Refills: 11 | Status: SHIPPED | OUTPATIENT
Start: 2024-02-14 | End: 2024-02-20

## 2024-02-14 ASSESSMENT — LIFESTYLE VARIABLES
HOW OFTEN DURING THE LAST YEAR HAVE YOU HAD A FEELING OF GUILT OR REMORSE AFTER DRINKING: NEVER
AUDIT-C TOTAL SCORE: 0
HAVE YOU OR SOMEONE ELSE BEEN INJURED AS A RESULT OF YOUR DRINKING: NO
HOW OFTEN DO YOU HAVE A DRINK CONTAINING ALCOHOL: NEVER
HOW OFTEN DURING THE LAST YEAR HAVE YOU FOUND THAT YOU WERE NOT ABLE TO STOP DRINKING ONCE YOU HAD STARTED: NEVER
SKIP TO QUESTIONS 9-10: 1
HOW MANY STANDARD DRINKS CONTAINING ALCOHOL DO YOU HAVE ON A TYPICAL DAY: PATIENT DOES NOT DRINK
HAS A RELATIVE, FRIEND, DOCTOR, OR ANOTHER HEALTH PROFESSIONAL EXPRESSED CONCERN ABOUT YOUR DRINKING OR SUGGESTED YOU CUT DOWN: NO
HOW OFTEN DURING THE LAST YEAR HAVE YOU NEEDED AN ALCOHOLIC DRINK FIRST THING IN THE MORNING TO GET YOURSELF GOING AFTER A NIGHT OF HEAVY DRINKING: NEVER
HOW OFTEN DO YOU HAVE SIX OR MORE DRINKS ON ONE OCCASION: NEVER
AUDIT TOTAL SCORE: 0
HOW OFTEN DURING THE LAST YEAR HAVE YOU BEEN UNABLE TO REMEMBER WHAT HAPPENED THE NIGHT BEFORE BECAUSE YOU HAD BEEN DRINKING: NEVER
HOW OFTEN DURING THE LAST YEAR HAVE YOU FAILED TO DO WHAT WAS NORMALLY EXPECTED FROM YOU BECAUSE OF DRINKING: NEVER

## 2024-02-14 ASSESSMENT — PATIENT HEALTH QUESTIONNAIRE - PHQ9
2. FEELING DOWN, DEPRESSED OR HOPELESS: NOT AT ALL
1. LITTLE INTEREST OR PLEASURE IN DOING THINGS: NOT AT ALL
SUM OF ALL RESPONSES TO PHQ9 QUESTIONS 1 AND 2: 0

## 2024-02-14 ASSESSMENT — ENCOUNTER SYMPTOMS
EYES NEGATIVE: 1
PALPITATIONS: 1
GASTROINTESTINAL NEGATIVE: 1
DEPRESSION: 0
LOSS OF SENSATION IN FEET: 1
OCCASIONAL FEELINGS OF UNSTEADINESS: 1
RESPIRATORY NEGATIVE: 1

## 2024-02-14 ASSESSMENT — PAIN SCALES - GENERAL: PAINLEVEL: 1

## 2024-02-14 NOTE — PATIENT INSTRUCTIONS
We need to increase your blood pressure meds - lets increase the lisinopril to 20 mg daily;    In terms of your diabetes, I think we need to adjust your program. We are adding farxiga 5 mg daily and are going to stop the glipizide.    I will see you in 3 months

## 2024-02-15 ENCOUNTER — TELEPHONE (OUTPATIENT)
Dept: PRIMARY CARE | Facility: CLINIC | Age: 74
End: 2024-02-15
Payer: MEDICARE

## 2024-02-15 NOTE — TELEPHONE ENCOUNTER
Patient states that at appointment yesterday it was discussed that you would be increasing her lisinopril. Pat states that she has never taken this medication. Please advise.

## 2024-02-19 DIAGNOSIS — E11.9 TYPE 2 DIABETES MELLITUS WITHOUT COMPLICATION, WITHOUT LONG-TERM CURRENT USE OF INSULIN (MULTI): Primary | ICD-10-CM

## 2024-02-20 ENCOUNTER — TELEMEDICINE (OUTPATIENT)
Dept: PHARMACY | Facility: HOSPITAL | Age: 74
End: 2024-02-20
Payer: MEDICARE

## 2024-02-20 DIAGNOSIS — E11.9 TYPE 2 DIABETES MELLITUS WITHOUT COMPLICATION, WITHOUT LONG-TERM CURRENT USE OF INSULIN (MULTI): ICD-10-CM

## 2024-02-20 NOTE — PROGRESS NOTES
MEDICATION MANAGEMENT    Daniela Epstein is a 73 y.o. female who was referred by Pablo Montoya MD to complete medication reconciliation and review with the clinical pharmacist.  A comprehensive medication review was completed with the patient via telephone today.  Patient reports financial barrier with current medication.      MEDICATION HISTORY  Allergies   Allergen Reactions    Other Other     TARTICK DYSKENESIA    Phenothiazines Hives, Shortness of breath, Other and Unknown     Slurred speech    Erythromycin Hives, Unknown and Rash    Haloperidol Unknown     Current Outpatient Medications on File Prior to Visit   Medication Sig Dispense Refill    atorvastatin (Lipitor) 40 mg tablet Take 1 tablet (40 mg) by mouth once daily.      blood sugar diagnostic (Truetest Test Strips) strip 4 times a day.      blood sugar diagnostic strip Inject under the skin once daily.      cholecalciferol (Vitamin D3) 25 MCG (1000 UT) tablet Take 2 tablets (50 mcg) by mouth once daily.      cyanocobalamin (Vitamin B-12) 1,000 mcg tablet Take 1 tablet (1,000 mcg) by mouth once daily.      docusate sodium (Colace) 100 mg capsule Take 1 capsule (100 mg) by mouth once daily as needed.      ferrous sulfate 325 (65 Fe) MG EC tablet Take 1 tablet by mouth 1 (one) time per week.      fluticasone (Flonase) 50 mcg/actuation nasal spray Administer 1 spray into each nostril once daily as needed.      hydroCHLOROthiazide (HYDRODiuril) 12.5 mg tablet Take 1 tablet (12.5 mg) by mouth once daily in the morning.      imipramine (Tofranil) 25 mg tablet TAKE 1 TABLET BY MOUTH ONCE A DAY AT BEDTIME 90 tablet 1    lamoTRIgine (LaMICtal) 200 mg tablet Take 1 tablet (200 mg) by mouth once daily. ODT?      lisinopril 20 mg tablet Take 1 tablet (20 mg) by mouth once daily. 90 tablet 3    loperamide (Imodium A-D) 2 mg tablet Take 1 tablet (2 mg) by mouth 4 times a day as needed.      loratadine (Claritin) 10 mg tablet Take 1 tablet (10 mg) by mouth if  needed.      metFORMIN (Glucophage) 500 mg tablet Take 1 tablet (500 mg) by mouth 2 times a day.      omeprazole (PriLOSEC) 20 mg DR capsule Take 2 capsules (40 mg) by mouth once daily.      polyethylene glycol (Miralax) 17 gram packet Take 17 g by mouth once daily as needed. MIX WITH 8 OZ FLUID      sertraline (Zoloft) 50 mg tablet Take 1 tablet (50 mg) by mouth once daily.      tiZANidine (Zanaflex) 4 mg tablet Take 1 tablet (4 mg) by mouth 2 times a day as needed (HEAD OR NECK PAIN).      [DISCONTINUED] traMADol (Ultram) 50 mg tablet Take 1 tablet (50 mg) by mouth every 6 hours if needed.      [DISCONTINUED] acetaminophen (Tylenol) 500 mg tablet Take 2 tablets (1,000 mg) by mouth every 6 hours if needed.      [DISCONTINUED] blood-glucose sensor (DEXCOM G6 SENSOR MISC) as directed . .      [DISCONTINUED] clobetasol (Temovate) 0.05 % cream Apply 1 Application topically 2 times a day.      [DISCONTINUED] dapagliflozin propanediol (Farxiga) 5 mg Take 1 tablet (5 mg) by mouth once daily. 30 tablet 11    [DISCONTINUED] Dexcom G4 platinum  (Dexcom G6 ) misc as directed . .      [DISCONTINUED] Dexcom G4 platinum transmitter (Dexcom G6 Transmitter) device as directed . .      [DISCONTINUED] glipiZIDE (Glucotrol) 5 mg tablet Take 1 tablet (5 mg) by mouth 2 times a day.      [DISCONTINUED] HYDROcodone-acetaminophen (Xodol) 7.5-300 mg tablet tablet Take by mouth.      [DISCONTINUED] lisinopril 10 mg tablet Take by mouth once daily.      [DISCONTINUED] mirtazapine (Remeron) 15 mg tablet Take 1 tablet (15 mg) by mouth once daily at bedtime.      [DISCONTINUED] pen needle, diabetic (BD ULTRA-FINE MINI PEN NEEDLE MISC) As directed dx: E11.9 bid      [DISCONTINUED] triamcinolone (Kenalog) 0.1 % cream Apply 1 Application topically 2 times a week.       No current facility-administered medications on file prior to visit.        Patient Assistance Screening (VAF)  Patient verbally reports monthly or yearly income  which is less than 400% federal poverty level.  Application for program has been submitted for the following medications: Jardiance and Ozempic  Patient has been informed that program team will be reaching out to them to discuss necessary documentation, instructed to answer phone/return voicemail.   Patient aware this process may take up to 6 weeks.   If approved medication must be filled through [facility] pharmacy and may be picked up or mailed to patient.       LABS  There were no vitals taken for this visit.   Lab Results   Component Value Date    HGBA1C 6.9 (H) 01/25/2024    HGBA1C 6.7 (H) 10/27/2023    HGBA1C 6.2 (H) 07/06/2023     Lab Results   Component Value Date    BILITOT 0.3 01/25/2024    CALCIUM 9.6 01/25/2024    CO2 30 01/25/2024     01/25/2024    CREATININE 1.60 01/25/2024    GLUCOSE 148 (H) 01/25/2024    ALKPHOS 158 (H) 01/25/2024    K 4.3 01/25/2024    PROT 6.8 01/25/2024     01/25/2024    AST 11 01/25/2024    ALT 9 01/25/2024    BUN 25 01/25/2024    ANIONGAP 11 01/25/2024    MG 1.82 03/24/2023    PHOS 4.8 (H) 03/27/2023    GGT 18 09/03/2018    ALBUMIN 4.2 01/25/2024    LIPASE 11 (L) 11/27/2019    GFRF 41 (A) 03/24/2023     Lab Results   Component Value Date    TRIG 127 01/25/2024    CHOL 167 01/25/2024    LDLCALC 68 01/25/2024    HDL 74.0 01/25/2024         Assessment/Plan    Jardiance Education:     - Counseled patient on Jardiance MOA, expectations, side effects, duration of therapy, administration, and monitoring parameters.  - Reviewed the benefits of SGLT-2i therapy, such as glycemic control and kidney and CV protection.  - Advised patient to practice proper  hygiene to reduce risk of UTIs or yeast infections.  - Advised patient to maintain adequate fluid intake to remain hydrated while on SGLT2i therapy.  - Answered all patient questions and concerns.    Ozempic Education:     - Counseled patient on Ozempic MOA, expectations, side effects, duration of therapy, administration,  and monitoring parameters.  - Provided detailed dosing and administration counseling to ensure proper technique.   - Reviewed Ozempic titration schedule, starting with 0.25 mg once weekly for 4 weeks, then continuing on 0.5 mg once weekly. Pt verbalized understanding.  - Counseled patient on the benefits of GLP-1ra, such as cardiovascular risk reduction, glycemic control, and weight loss potential.  - Reviewed storage requirements of Ozempic when not in use, and when to administer the medication if a dose is missed.  - Advised patient that they may experience improved satiety after meals and portion sizes of meals may be reduced as doses of Ozempic increase.  - Counseled patient to avoid foods that are fatty/oily as this may precipitate the nausea/GI upset that may occur with new start Ozempic.       Amber Woods, PharmD, BCPS    Verbal consent to manage patient's drug therapy was obtained from the patient and/or an individual authorized to act on behalf of a patient. They were informed they may decline to participate or withdraw from participation in pharmacy services at any time.

## 2024-02-26 ENCOUNTER — TELEPHONE (OUTPATIENT)
Dept: PRIMARY CARE | Facility: CLINIC | Age: 74
End: 2024-02-26

## 2024-02-26 NOTE — TELEPHONE ENCOUNTER
Pt c/o unresolved diarrhea. Patient states that decreasing lisinopril dose did not help. Patient states that she was also advised to stop taking glipizide and is unsure if she should start a new med to replace? Please advise.

## 2024-02-26 NOTE — TELEPHONE ENCOUNTER
Farxiga was not covered. Jardiance was sent in per clinical pharmacist. Spoke with provider, stated pt should continue glipizide until jardiance gets delivered through assistance program and stop metformin

## 2024-02-28 PROCEDURE — RXMED WILLOW AMBULATORY MEDICATION CHARGE

## 2024-03-01 ENCOUNTER — PHARMACY VISIT (OUTPATIENT)
Dept: PHARMACY | Facility: CLINIC | Age: 74
End: 2024-03-01
Payer: COMMERCIAL

## 2024-03-12 ENCOUNTER — TELEPHONE (OUTPATIENT)
Dept: PRIMARY CARE | Facility: CLINIC | Age: 74
End: 2024-03-12
Payer: MEDICARE

## 2024-03-12 DIAGNOSIS — B37.9 CANDIDA INFECTION: Primary | ICD-10-CM

## 2024-03-12 RX ORDER — FLUCONAZOLE 150 MG/1
150 TABLET ORAL ONCE
Qty: 1 TABLET | Refills: 0 | Status: SHIPPED | OUTPATIENT
Start: 2024-03-12 | End: 2024-03-12

## 2024-03-12 NOTE — TELEPHONE ENCOUNTER
Pt c/o vaginal itching and can feel bumps in vaginal area.  Pt suspects she may have yeast infection and asking if you could send Rx to   [facility] [address], [place]    Also, pt states she fell on 2/20/24 and scraped lower right side and back on tow bar.  Did not go anywhere to get it checked out but still having discomfort.  Pt asking if she needs to get an XR.  Please advise.  Ph: [phone]

## 2024-03-13 ENCOUNTER — APPOINTMENT (OUTPATIENT)
Dept: CARDIOLOGY | Facility: CLINIC | Age: 74
End: 2024-03-13
Payer: MEDICARE

## 2024-03-13 ENCOUNTER — OFFICE VISIT (OUTPATIENT)
Dept: PRIMARY CARE | Facility: CLINIC | Age: 74
End: 2024-03-13
Payer: MEDICARE

## 2024-03-13 VITALS
OXYGEN SATURATION: 96 % | TEMPERATURE: 97.2 F | HEIGHT: 63 IN | HEART RATE: 50 BPM | DIASTOLIC BLOOD PRESSURE: 80 MMHG | WEIGHT: 198 LBS | SYSTOLIC BLOOD PRESSURE: 130 MMHG | BODY MASS INDEX: 35.08 KG/M2

## 2024-03-13 DIAGNOSIS — N89.8 VAGINAL IRRITATION: ICD-10-CM

## 2024-03-13 DIAGNOSIS — S20.221A CONTUSION OF RIGHT SIDE OF BACK, INITIAL ENCOUNTER: Primary | ICD-10-CM

## 2024-03-13 PROCEDURE — 3075F SYST BP GE 130 - 139MM HG: CPT | Performed by: LICENSED PRACTICAL NURSE

## 2024-03-13 PROCEDURE — 99213 OFFICE O/P EST LOW 20 MIN: CPT | Performed by: LICENSED PRACTICAL NURSE

## 2024-03-13 PROCEDURE — 3048F LDL-C <100 MG/DL: CPT | Performed by: LICENSED PRACTICAL NURSE

## 2024-03-13 PROCEDURE — 3079F DIAST BP 80-89 MM HG: CPT | Performed by: LICENSED PRACTICAL NURSE

## 2024-03-13 PROCEDURE — 1159F MED LIST DOCD IN RCRD: CPT | Performed by: LICENSED PRACTICAL NURSE

## 2024-03-13 PROCEDURE — 4010F ACE/ARB THERAPY RXD/TAKEN: CPT | Performed by: LICENSED PRACTICAL NURSE

## 2024-03-13 PROCEDURE — 1036F TOBACCO NON-USER: CPT | Performed by: LICENSED PRACTICAL NURSE

## 2024-03-13 PROCEDURE — 3044F HG A1C LEVEL LT 7.0%: CPT | Performed by: LICENSED PRACTICAL NURSE

## 2024-03-13 PROCEDURE — 1126F AMNT PAIN NOTED NONE PRSNT: CPT | Performed by: LICENSED PRACTICAL NURSE

## 2024-03-13 ASSESSMENT — PATIENT HEALTH QUESTIONNAIRE - PHQ9
3. TROUBLE FALLING OR STAYING ASLEEP OR SLEEPING TOO MUCH: NOT AT ALL
SUM OF ALL RESPONSES TO PHQ9 QUESTIONS 1 AND 2: 5
8. MOVING OR SPEAKING SO SLOWLY THAT OTHER PEOPLE COULD HAVE NOTICED. OR THE OPPOSITE, BEING SO FIGETY OR RESTLESS THAT YOU HAVE BEEN MOVING AROUND A LOT MORE THAN USUAL: NOT AT ALL
10. IF YOU CHECKED OFF ANY PROBLEMS, HOW DIFFICULT HAVE THESE PROBLEMS MADE IT FOR YOU TO DO YOUR WORK, TAKE CARE OF THINGS AT HOME, OR GET ALONG WITH OTHER PEOPLE: SOMEWHAT DIFFICULT
2. FEELING DOWN, DEPRESSED OR HOPELESS: NEARLY EVERY DAY
1. LITTLE INTEREST OR PLEASURE IN DOING THINGS: MORE THAN HALF THE DAYS
4. FEELING TIRED OR HAVING LITTLE ENERGY: MORE THAN HALF THE DAYS
SUM OF ALL RESPONSES TO PHQ QUESTIONS 1-9: 8
6. FEELING BAD ABOUT YOURSELF - OR THAT YOU ARE A FAILURE OR HAVE LET YOURSELF OR YOUR FAMILY DOWN: NOT AT ALL
9. THOUGHTS THAT YOU WOULD BE BETTER OFF DEAD, OR OF HURTING YOURSELF: SEVERAL DAYS
5. POOR APPETITE OR OVEREATING: NOT AT ALL
7. TROUBLE CONCENTRATING ON THINGS, SUCH AS READING THE NEWSPAPER OR WATCHING TELEVISION: NOT AT ALL

## 2024-03-13 ASSESSMENT — ENCOUNTER SYMPTOMS
BACK PAIN: 1
LOSS OF SENSATION IN FEET: 1
DEPRESSION: 1
OCCASIONAL FEELINGS OF UNSTEADINESS: 1

## 2024-03-13 ASSESSMENT — PAIN SCALES - GENERAL: PAINLEVEL: 0-NO PAIN

## 2024-03-13 NOTE — PROGRESS NOTES
[facility]: [facility]  PROGRESS NOTE      Daniela Epstein is a 73 y.o. female that is presenting today for No chief complaint on file..      Subjective   Mrs. Epstein presents to the office for concern of lump on her right lower back that appeared 3 weeks ago. She shares that she fell and hit a tow bar lift attached to a car which caused her injury. She experienced significant initial bruising to the area that has generally resolved, however she notes a lump to her right side that remains. She denies blood to the urine, urinary changes, or  significant back pain.     She also notes having concerns of a yeast infection with symptoms of vaginal itching, irritation, bumps and bleeding from the vagina. She was prescribed Diflucan yesterday. She is concerned about her symptoms.      Review of Systems   Musculoskeletal:  Positive for back pain.        Lump to the right lower back      Objective   Vitals:    03/13/24 1617   BP: 130/80   Pulse: 50   Temp: 36.2 °C (97.2 °F)   SpO2: 96%      Body mass index is 35.07 kg/m².  Physical Exam  Constitutional:       General: She is not in acute distress.     Appearance: She is not ill-appearing, toxic-appearing or diaphoretic.   Cardiovascular:      Rate and Rhythm: Regular rhythm.   Pulmonary:      Effort: No respiratory distress.      Breath sounds: No wheezing or rales.   Abdominal:      General: There is no distension.      Tenderness: There is no abdominal tenderness. There is no guarding.   Musculoskeletal:        Back:       Comments: Small lump located beneath the surface of the skin to her right lumbar paraspinal region. Slight tenderness noted with palpation. Small amount of ecchymosis noted to the lateral side. No CVA ecchymosis.    Skin:     General: Skin is warm and dry.       Diagnostic Results   Lab Results   Component Value Date    GLUCOSE 148 (H) 01/25/2024    CALCIUM 9.6 01/25/2024     01/25/2024    K 4.3 01/25/2024    CO2 30  "01/25/2024     01/25/2024    BUN 25 01/25/2024    CREATININE 1.60 01/25/2024     Lab Results   Component Value Date    ALT 9 01/25/2024    AST 11 01/25/2024    GGT 18 09/03/2018    ALKPHOS 158 (H) 01/25/2024    BILITOT 0.3 01/25/2024     Lab Results   Component Value Date    WBC 9.0 01/25/2024    HGB 12.0 01/25/2024    HCT 38.7 01/25/2024    MCV 87 01/25/2024     01/25/2024     Lab Results   Component Value Date    CHOL 167 01/25/2024    CHOL 148 07/06/2023    CHOL 145 07/11/2022     Lab Results   Component Value Date    HDL 74.0 01/25/2024    HDL 65 07/06/2023    HDL 65 07/11/2022     Lab Results   Component Value Date    LDLCALC 68 01/25/2024    LDLCALC 59 (L) 07/06/2023    LDLCALC 51 (L) 07/11/2022     Lab Results   Component Value Date    TRIG 127 01/25/2024    TRIG 121 07/06/2023    TRIG 145 07/11/2022     No components found for: \"CHOLHDL\"  Lab Results   Component Value Date    HGBA1C 6.9 (H) 01/25/2024     Other labs not included in the list above were reviewed either before or during this encounter.    History    Past Medical History:   Diagnosis Date    Acoustic neuroma (CMS/[facility]) 08/30/2023    Acute lower urinary tract infection 08/30/2023    Affective psychosis, bipolar (CMS/[facility]) 08/30/2023    Age-related nuclear cataract, left eye 10/16/2015    Age-related nuclear cataract of left eye    Age-related nuclear cataract, right eye 10/16/2015    Age-related nuclear cataract of right eye    Anxiety disorder 08/30/2023    Bilateral dry eyes 08/30/2023    Bipolar disorder (CMS/[facility]) 08/30/2023    Breast lump 08/30/2023    Cardiac device in situ 08/30/2023    Cerebrovascular accident (CMS/[facility]) 08/30/2023    Depression 08/30/2023    Depression, unspecified 05/20/2014    Depression    Diabetes mellitus (CMS/[facility]) 08/30/2023    Diabetes mellitus, type 2 (CMS/[facility]) 08/30/2023    Dry eye syndrome of unspecified lacrimal gland 06/25/2015    Dry eye syndrome    Dysfunction of right eustachian tube 08/30/2023    " Dyslipidemia 08/30/2023    Dysphagia 08/30/2023    Essential (primary) hypertension 12/15/2013    Hypertension    Essential (primary) hypertension 08/30/2023    Fibrocystic breast changes 08/30/2023    Gastroesophageal reflux disease 08/30/2023    Hazy vision 08/30/2023    Hypokalemia 08/30/2023    Mastodynia 08/30/2023    Mental disorder 08/30/2023    Mixed hyperlipidemia 08/30/2023    Myopia, bilateral 10/16/2015    High myopia, both eyes    Nausea & vomiting 08/30/2023    Nose injury 08/30/2023    Oral mucosal lesion 08/30/2023    Osteoarthritis 08/30/2023    Other disorders affecting eyelid function 06/25/2015    Excessive involuntary blinking    Overactive bladder 08/30/2023    Pain in left hand 02/06/2023    Personal history of diseases of the blood and blood-forming organs and certain disorders involving the immune mechanism     History of anemia    Personal history of other diseases of the digestive system     History of esophageal reflux    Personal history of other diseases of the nervous system and sense organs     History of cataract    Personal history of other diseases of urinary system     History of bladder problems    Personal history of other mental and behavioral disorders     History of mental disorder    Pure hypercholesterolemia, unspecified 05/20/2014    High cholesterol    Rhinitis 08/30/2023    S/P knee replacement 08/30/2023    Snoring     Snoring    Solitary cyst of breast 08/30/2023    Swelling, mass, or lump on face 08/30/2023    Syncope 08/30/2023    TMJ (temporomandibular joint syndrome) 08/30/2023    Type 2 diabetes mellitus without complications (CMS/HCC) 06/25/2015    Diabetes mellitus    Unsteady gait 08/30/2023    Vitamin D deficiency 08/30/2023    Weakness 03/27/2023     Past Surgical History:   Procedure Laterality Date    ANKLE SURGERY  1998    injury    APPENDECTOMY  1972    BLEPHAROPLASTY Bilateral 2010    BREAST BIOPSY  2011    BREAST LUMPECTOMY Right 2011    atypical  hyperplasia    CARDIAC SURGERY  03/06/2023    implant cardiac event recorder for recurrent syncope/ Dr. Fowler    CARPAL TUNNEL RELEASE Left 2010    Neuroplasty Decompression Median Nerve At Carpal Tunnel    CARPAL TUNNEL RELEASE Right 2010    CHOLECYSTECTOMY  1979    COLONOSCOPY  2007    COLONOSCOPY  2013    COLONOSCOPY  2014    COLONOSCOPY  11/06/2020    CRANIOTOMY Right 2006    acoustic neuroma    CT ANGIO NECK  03/22/2023    CT NECK ANGIO W AND WO IV CONTRAST [facility]    CT HEAD ANGIO W AND WO IV CONTRAST  03/22/2023    CT HEAD ANG[place] AND [place] [facility] [place]    DILATION AND CURETTAGE OF UTERUS  1971    DILATION AND CURETTAGE OF UTERUS  2013    ESOPHAGOGASTRODUODENOSCOPY  2007    ESOPHAGOGASTRODUODENOSCOPY  2010    ESOPHAGOGASTRODUODENOSCOPY  2020    HEMORRHOID SURGERY  1990    KNEE SURGERY Right 2008    scar tissue    MOUTH SURGERY Right 2017    right buccal lesion    MR HEAD ANGIO WO IV CONTRAST  01/18/2015    MR HEAD ANGIO WO IV CONTRAST [facility]    MR HEAD ANGIO WO IV CONTRAST  05/08/2016    MR HEAD ANGIO WO IV CONTRAST [facility]    MR HEAD ANGIO WO IV CONTRAST  02/28/2017    MR HEAD ANGIO WO IV CONTRAST [facility]    MR NECK ANGIO WO IV CONTRAST  01/18/2015    MR NECK ANGIO WO IV CONTRAST [facility]    NASAL SINUS SURGERY  1985    ORIF FEMUR FRACTURE Right 2022    OVARIAN CYST REMOVAL  1972    SEPTOPLASTY  05/20/2014    Septoplasty    TONSILLECTOMY      TOTAL KNEE ARTHROPLASTY Right 2007    TOTAL KNEE ARTHROPLASTY Left 2011    TUBAL LIGATION  1983    URETHRAL SLING  2013     Family History   Problem Relation Name Age of Onset    Arthritis Mother      Breast cancer Mother      Diabetes Mother      Hypertension Mother      Heart failure Father      Heart disease Father      Arthritis Other SIBLINGS     Diabetes Other SIBLINGS     Hypertension Other SIBLINGS      Social History     Socioeconomic History    Marital status:      Spouse name: Not on file     Number of children: Not on file    Years of education: Not on file    Highest education level: Not on file   Occupational History    Not on file   Tobacco Use    Smoking status: Never    Smokeless tobacco: Never   Substance and Sexual Activity    Alcohol use: Never    Drug use: Never    Sexual activity: Not on file   Other Topics Concern    Not on file   Social History Narrative    Not on file     Social Determinants of Health     Financial Resource Strain: Not on file   Food Insecurity: Not on file   Transportation Needs: Not on file   Physical Activity: Not on file   Stress: Not on file   Social Connections: Not on file   Intimate Partner Violence: Not on file   Housing Stability: Not on file     Allergies   Allergen Reactions    Other Other     TARTICK DYSKENESIA    Phenothiazines Hives, Shortness of breath, Other and Unknown     Slurred speech    Erythromycin Hives, Unknown and Rash    Haloperidol Unknown     Current Outpatient Medications on File Prior to Visit   Medication Sig Dispense Refill    atorvastatin (Lipitor) 40 mg tablet Take 1 tablet (40 mg) by mouth once daily.      blood sugar diagnostic (Truetest Test Strips) strip 4 times a day.      blood sugar diagnostic strip Inject under the skin once daily.      cholecalciferol (Vitamin D3) 25 MCG (1000 UT) tablet Take 2 tablets (50 mcg) by mouth once daily.      cyanocobalamin (Vitamin B-12) 1,000 mcg tablet Take 1 tablet (1,000 mcg) by mouth once daily.      docusate sodium (Colace) 100 mg capsule Take 1 capsule (100 mg) by mouth once daily as needed.      empagliflozin (Jardiance) 10 mg Take 1 tablet (10 mg) by mouth once daily. 30 tablet 2    ferrous sulfate 325 (65 Fe) MG EC tablet Take 1 tablet by mouth 1 (one) time per week.      fluticasone (Flonase) 50 mcg/actuation nasal spray Administer 1 spray into each nostril once daily as needed.      hydroCHLOROthiazide (HYDRODiuril) 12.5 mg tablet Take 1 tablet (12.5 mg) by mouth once daily in the morning.       imipramine (Tofranil) 25 mg tablet TAKE 1 TABLET BY MOUTH ONCE A DAY AT BEDTIME 90 tablet 1    lamoTRIgine (LaMICtal) 200 mg tablet Take 1 tablet (200 mg) by mouth once daily. ODT?      lisinopril 20 mg tablet Take 1 tablet (20 mg) by mouth once daily. (Patient taking differently: Take 0.5 tablets (10 mg) by mouth once daily.) 90 tablet 3    loperamide (Imodium A-D) 2 mg tablet Take 1 tablet (2 mg) by mouth 4 times a day as needed.      loratadine (Claritin) 10 mg tablet Take 1 tablet (10 mg) by mouth if needed.      omeprazole (PriLOSEC) 20 mg DR capsule Take 2 capsules (40 mg) by mouth once daily.      polyethylene glycol (Miralax) 17 gram packet Take 17 g by mouth once daily as needed. MIX WITH 8 OZ FLUID      sertraline (Zoloft) 50 mg tablet Take 1 tablet (50 mg) by mouth once daily.      tiZANidine (Zanaflex) 4 mg tablet Take 1 tablet (4 mg) by mouth 2 times a day as needed (HEAD OR NECK PAIN).      [] fluconazole (Diflucan) 150 mg tablet Take 1 tablet (150 mg) by mouth 1 time for 1 dose. 1 tablet 0    [DISCONTINUED] metFORMIN (Glucophage) 500 mg tablet Take 1 tablet (500 mg) by mouth 2 times a day.       No current facility-administered medications on file prior to visit.     Immunization History   Administered Date(s) Administered    DT (pediatric) 2007    Influenza, seasonal, injectable 10/29/2010, 2011    Pfizer Purple Cap SARS-CoV-2 2021, 2021, 2021    Pneumococcal conjugate vaccine, 13-valent (PREVNAR 13) 2018    Pneumococcal conjugate vaccine, 20-valent (PREVNAR 20) 2023    Pneumococcal polysaccharide vaccine, 23-valent, age 2 years and older (PNEUMOVAX 23) 10/29/2006    Tdap vaccine, age 7 year and older (BOOSTRIX, ADACEL) 2018     Patient's medical history was reviewed and updated either before or during this encounter.       Assessment/Plan   Problem List Items Addressed This Visit    None  Visit Diagnoses       Contusion of right side of  back, initial encounter    -  Primary    Relevant Orders    XR lumbar spine 2-3 views    Vaginal irritation            I recommended an Xray to her lower spine as she not ruled out injury to this area. She has no bruising to her CVA area or concern for bleeding. I did explain to there that the lump will likely take time to resolve and she should monitor for changes such as increased pain or changes to the skin.    Regarding her vaginal symptoms I explained that she should allow the Diflucan to work and that the bleeding she noticed may have been from contact irration or rubbing. I explained if her symptoms continue after 3-4 days she should reach out to her GYN for follow up. We can provide a referral if she needs one.     She will follow back up in the office in 1 week.     Heriberto Hernandez, YOAN-CNP

## 2024-03-14 ENCOUNTER — HOSPITAL ENCOUNTER (OUTPATIENT)
Dept: RADIOLOGY | Facility: CLINIC | Age: 74
Discharge: HOME | End: 2024-03-14
Payer: MEDICARE

## 2024-03-14 DIAGNOSIS — S20.221A CONTUSION OF RIGHT SIDE OF BACK, INITIAL ENCOUNTER: ICD-10-CM

## 2024-03-14 PROCEDURE — 72110 X-RAY EXAM L-2 SPINE 4/>VWS: CPT

## 2024-03-15 NOTE — RESULT ENCOUNTER NOTE
Please inform Mrs. Epstein that the Xray of her spine shows scoliosis, and bone spurs/small abnormal bony areas to her low back. These findings are not related to her recent injury. These findings are not related to her the small lump to her back from her contusion.

## 2024-03-20 ENCOUNTER — APPOINTMENT (OUTPATIENT)
Dept: PRIMARY CARE | Facility: CLINIC | Age: 74
End: 2024-03-20
Payer: MEDICARE

## 2024-03-21 ENCOUNTER — HOSPITAL ENCOUNTER (OUTPATIENT)
Dept: CARDIOLOGY | Facility: CLINIC | Age: 74
Discharge: HOME | End: 2024-03-21
Payer: MEDICARE

## 2024-03-21 DIAGNOSIS — R55 SYNCOPE: ICD-10-CM

## 2024-03-21 DIAGNOSIS — Z95.9 CARDIAC DEVICE IN SITU: Primary | ICD-10-CM

## 2024-03-21 DIAGNOSIS — Z95.9 CARDIAC DEVICE IN SITU: ICD-10-CM

## 2024-03-21 DIAGNOSIS — R55 SYNCOPE, UNSPECIFIED SYNCOPE TYPE: ICD-10-CM

## 2024-03-21 PROCEDURE — 93291 INTERROG DEV EVAL SCRMS IP: CPT | Performed by: INTERNAL MEDICINE

## 2024-03-21 PROCEDURE — 93291 INTERROG DEV EVAL SCRMS IP: CPT

## 2024-03-25 ENCOUNTER — TELEPHONE (OUTPATIENT)
Dept: PRIMARY CARE | Facility: CLINIC | Age: 74
End: 2024-03-25

## 2024-03-25 NOTE — TELEPHONE ENCOUNTER
Patient states that her fasting blood sugar has been running between 250-300 since starting jardiance. Patient states that last night it spiked to over 500. Would like to know what dr recommends. Please advise.

## 2024-03-26 ENCOUNTER — TELEMEDICINE (OUTPATIENT)
Dept: PHARMACY | Facility: HOSPITAL | Age: 74
End: 2024-03-26
Payer: MEDICARE

## 2024-03-26 DIAGNOSIS — E11.9 TYPE 2 DIABETES MELLITUS WITHOUT COMPLICATION, WITHOUT LONG-TERM CURRENT USE OF INSULIN (MULTI): ICD-10-CM

## 2024-03-26 PROCEDURE — RXMED WILLOW AMBULATORY MEDICATION CHARGE

## 2024-03-26 NOTE — TELEPHONE ENCOUNTER
Talked to patient and advised of PCP's message. Patient was agreeable. I will call patient in a week to see how sugars are trending. If they are still elevated we could discuss starting ozempic, would be no copay through [facility] patient assistance program.

## 2024-03-26 NOTE — PROGRESS NOTES
Patient reports that since stopping glipizide and starting Jardiance (1 month ago) her blood sugars have been consistently in the 200-300s. Per Dr. HUI we will increase Jardiance to 25 mg once daily. Patient did have a yeast infection but it has since resolved, discussed that she is to let us know right away if she has another, patient stated understanding. Will discuss starting something like ozempic with PCP. Patient will call if her blood sugars stay elevated.     GERHARD Woods, PharmD, BCPS

## 2024-03-29 ENCOUNTER — PHARMACY VISIT (OUTPATIENT)
Dept: PHARMACY | Facility: CLINIC | Age: 74
End: 2024-03-29
Payer: COMMERCIAL

## 2024-04-01 ENCOUNTER — TELEPHONE (OUTPATIENT)
Dept: PRIMARY CARE | Facility: CLINIC | Age: 74
End: 2024-04-01

## 2024-04-01 NOTE — TELEPHONE ENCOUNTER
Talked to patient, she just got the Jardiance 25 mg in the mail and starting taking today. Blood sugars have not gone below 200 and has noticed them going up to 500 after meals. Patient reports she is still taking glipizide 5 mg BID and metformin 1 g BID.

## 2024-04-02 PROCEDURE — RXMED WILLOW AMBULATORY MEDICATION CHARGE

## 2024-04-03 ENCOUNTER — PHARMACY VISIT (OUTPATIENT)
Dept: PHARMACY | Facility: CLINIC | Age: 74
End: 2024-04-03
Payer: COMMERCIAL

## 2024-04-11 ENCOUNTER — TELEPHONE (OUTPATIENT)
Dept: PHARMACY | Facility: HOSPITAL | Age: 74
End: 2024-04-11
Payer: MEDICARE

## 2024-04-11 DIAGNOSIS — E11.9 TYPE 2 DIABETES MELLITUS WITHOUT COMPLICATION, WITH LONG-TERM CURRENT USE OF INSULIN (MULTI): ICD-10-CM

## 2024-04-11 DIAGNOSIS — Z79.4 TYPE 2 DIABETES MELLITUS WITHOUT COMPLICATION, WITH LONG-TERM CURRENT USE OF INSULIN (MULTI): ICD-10-CM

## 2024-04-11 DIAGNOSIS — E11.9 TYPE 2 DIABETES MELLITUS WITHOUT COMPLICATION, WITHOUT LONG-TERM CURRENT USE OF INSULIN (MULTI): Primary | ICD-10-CM

## 2024-04-11 RX ORDER — INSULIN GLARGINE 100 [IU]/ML
10 INJECTION, SOLUTION SUBCUTANEOUS NIGHTLY
Qty: 3 ML | Refills: 1 | Status: SHIPPED | OUTPATIENT
Start: 2024-04-11 | End: 2024-05-20 | Stop reason: WASHOUT

## 2024-04-11 RX ORDER — PEN NEEDLE, DIABETIC 30 GX3/16"
NEEDLE, DISPOSABLE MISCELLANEOUS
Qty: 100 EACH | Refills: 1 | Status: SHIPPED | OUTPATIENT
Start: 2024-04-11

## 2024-04-11 NOTE — TELEPHONE ENCOUNTER
I talked to patient, she is agreeable to start once daily insulin. Basaglar is covered through her insurance program. See pending order. Thank you!

## 2024-04-11 NOTE — TELEPHONE ENCOUNTER
Patient called me stating that her blood sugars when waking up in the morning are consistently around 250 and in the evening are around 400. Patient is taking Jardiance 25 mg and Ozempic 0.25 mg (has taken two doses). Patient states that she is very tired all the time. I am wondering if we need to start Lantus to bring the sugars down at least until we can increase up the ozempic. I told patient I would call her back with what you think.

## 2024-04-19 ENCOUNTER — TELEPHONE (OUTPATIENT)
Dept: PRIMARY CARE | Facility: CLINIC | Age: 74
End: 2024-04-19
Payer: MEDICARE

## 2024-04-19 RX ORDER — INSULIN GLARGINE 100 [IU]/ML
12 INJECTION, SOLUTION SUBCUTANEOUS NIGHTLY
Qty: 6 ML | Refills: 3 | Status: SHIPPED | OUTPATIENT
Start: 2024-04-19 | End: 2024-05-20 | Stop reason: SDUPTHER

## 2024-04-19 NOTE — TELEPHONE ENCOUNTER
Patient states that she recently discontinued metformin and glipizide and started jardiance. Patient states that since then her fasting blood sugar has not gone below 250 and pt c/o nausea, lightheadedness and confusion. Patient would like to go back to using the metformin and glipizide. Please advise.

## 2024-04-23 ENCOUNTER — CLINICAL SUPPORT (OUTPATIENT)
Dept: AUDIOLOGY | Facility: CLINIC | Age: 74
End: 2024-04-23

## 2024-04-23 DIAGNOSIS — H90.3 SENSORINEURAL HEARING LOSS, ASYMMETRICAL: Primary | ICD-10-CM

## 2024-04-23 PROCEDURE — HRANC PR HEARING AID NO CHARGE: Performed by: AUDIOLOGIST

## 2024-04-23 NOTE — PROGRESS NOTES
AUDIOLOGY ADULT HEARING AID CHECK    Name:  Daniela Epstein  :  1950  Age:  73 y.o.  Date of Service:  2024    Reason for visit: Ms. Epstein is seen in the clinic today for a hearing aid check appointment. Patient complains that her hearing aids are making an echo sound, and the wind noise is too much.    HISTORY  Patient wears binaural 2023 Phonak Audeo BiCROS (Audeo P90-R in the left ear and Audeo CROS P-R in the right ear) rechargeable  in the canal ([place]) hearing aids in silver gray with 2C  in the right ear, 1S  in the left ear, small open domes, and no retention lines (right CROS serial number [id number], left serial number [id number]). Repair and loss/damage warranties end 2026. One year in-office service plan ends 2024. Not connected to iPhone. New user. [employer].     HEARING AID CHECK  Hearing Aid Physical Examination:  Right: unremarkable  Left: unremarkable    Hearing Aid Adjustment & Specific Counseling:  Devices were connected to the software via [employer]. Reduced CROS Balance to 0 each. Reduced target gain to 100%. Increased wind block to maximum in all situations.    IMPRESSIONS  Hearing aids were returned to the patient and she expressed satisfaction.    RECOMMENDATIONS  - Continued hearing aid use.  - Follow-up with audiology as scheduled, sooner if questions/problems arise.  - Follow up with otolaryngology as planned.  - Follow-up with medical care team as planned.    PATIENT EDUCATION  Discussed results, impressions and recommendations with the patient. Questions were addressed and the patient was encouraged to contact our office should concerns arise.    CHARGE CAPTURE  No fee under first year in-office service plan.    Time for this encounter: [id number]    Abner Mack, CCC-A  Licensed Audiologist

## 2024-05-07 ENCOUNTER — LAB (OUTPATIENT)
Dept: LAB | Facility: LAB | Age: 74
End: 2024-05-07
Payer: MEDICARE

## 2024-05-07 DIAGNOSIS — I10 ESSENTIAL (PRIMARY) HYPERTENSION: ICD-10-CM

## 2024-05-07 DIAGNOSIS — N18.32 TYPE 2 DIABETES MELLITUS WITH STAGE 3B CHRONIC KIDNEY DISEASE, WITHOUT LONG-TERM CURRENT USE OF INSULIN (MULTI): ICD-10-CM

## 2024-05-07 DIAGNOSIS — E11.22 TYPE 2 DIABETES MELLITUS WITH STAGE 3B CHRONIC KIDNEY DISEASE, WITHOUT LONG-TERM CURRENT USE OF INSULIN (MULTI): ICD-10-CM

## 2024-05-07 DIAGNOSIS — Z01.89 ENCOUNTER FOR ROUTINE LABORATORY TESTING: ICD-10-CM

## 2024-05-07 DIAGNOSIS — D50.9 IRON DEFICIENCY ANEMIA, UNSPECIFIED IRON DEFICIENCY ANEMIA TYPE: ICD-10-CM

## 2024-05-07 LAB
ALBUMIN SERPL-MCNC: 4.2 G/DL (ref 3.5–5)
ALP BLD-CCNC: 174 U/L (ref 35–125)
ALT SERPL-CCNC: 15 U/L (ref 5–40)
ANION GAP SERPL CALC-SCNC: 18 MMOL/L
AST SERPL-CCNC: 16 U/L (ref 5–40)
BASOPHILS # BLD AUTO: 0.13 X10*3/UL (ref 0–0.1)
BASOPHILS NFR BLD AUTO: 1.7 %
BILIRUB SERPL-MCNC: 0.3 MG/DL (ref 0.1–1.2)
BUN SERPL-MCNC: 32 MG/DL (ref 8–25)
CALCIUM SERPL-MCNC: 9.9 MG/DL (ref 8.5–10.4)
CHLORIDE SERPL-SCNC: 98 MMOL/L (ref 97–107)
CO2 SERPL-SCNC: 25 MMOL/L (ref 24–31)
CREAT SERPL-MCNC: 1.9 MG/DL (ref 0.4–1.6)
EGFRCR SERPLBLD CKD-EPI 2021: 28 ML/MIN/1.73M*2
EOSINOPHIL # BLD AUTO: 0.28 X10*3/UL (ref 0–0.4)
EOSINOPHIL NFR BLD AUTO: 3.6 %
ERYTHROCYTE [DISTWIDTH] IN BLOOD BY AUTOMATED COUNT: 13.4 % (ref 11.5–14.5)
EST. AVERAGE GLUCOSE BLD GHB EST-MCNC: 275 MG/DL
GLUCOSE SERPL-MCNC: 166 MG/DL (ref 65–99)
HBA1C MFR BLD: 11.2 %
HCT VFR BLD AUTO: 37.1 % (ref 36–46)
HGB BLD-MCNC: 11.6 G/DL (ref 12–16)
IMM GRANULOCYTES # BLD AUTO: 0.04 X10*3/UL (ref 0–0.5)
IMM GRANULOCYTES NFR BLD AUTO: 0.5 % (ref 0–0.9)
LYMPHOCYTES # BLD AUTO: 2.52 X10*3/UL (ref 0.8–3)
LYMPHOCYTES NFR BLD AUTO: 32.6 %
MCH RBC QN AUTO: 27.2 PG (ref 26–34)
MCHC RBC AUTO-ENTMCNC: 31.3 G/DL (ref 32–36)
MCV RBC AUTO: 87 FL (ref 80–100)
MONOCYTES # BLD AUTO: 0.74 X10*3/UL (ref 0.05–0.8)
MONOCYTES NFR BLD AUTO: 9.6 %
NEUTROPHILS # BLD AUTO: 4.02 X10*3/UL (ref 1.6–5.5)
NEUTROPHILS NFR BLD AUTO: 52 %
NRBC BLD-RTO: 0 /100 WBCS (ref 0–0)
PLATELET # BLD AUTO: 352 X10*3/UL (ref 150–450)
POTASSIUM SERPL-SCNC: 4.3 MMOL/L (ref 3.4–5.1)
PROT SERPL-MCNC: 7.3 G/DL (ref 5.9–7.9)
RBC # BLD AUTO: 4.26 X10*6/UL (ref 4–5.2)
SODIUM SERPL-SCNC: 141 MMOL/L (ref 133–145)
WBC # BLD AUTO: 7.7 X10*3/UL (ref 4.4–11.3)

## 2024-05-07 PROCEDURE — 36415 COLL VENOUS BLD VENIPUNCTURE: CPT

## 2024-05-07 PROCEDURE — 85025 COMPLETE CBC W/AUTO DIFF WBC: CPT

## 2024-05-07 PROCEDURE — 80053 COMPREHEN METABOLIC PANEL: CPT

## 2024-05-07 PROCEDURE — 83036 HEMOGLOBIN GLYCOSYLATED A1C: CPT

## 2024-05-08 ENCOUNTER — PHARMACY VISIT (OUTPATIENT)
Dept: PHARMACY | Facility: CLINIC | Age: 74
End: 2024-05-08
Payer: COMMERCIAL

## 2024-05-08 PROCEDURE — RXMED WILLOW AMBULATORY MEDICATION CHARGE

## 2024-05-15 PROBLEM — S92.403A: Status: RESOLVED | Noted: 2023-02-08 | Resolved: 2024-05-15

## 2024-05-15 PROBLEM — A49.8 INFECTION DUE TO ESCHERICHIA COLI: Status: RESOLVED | Noted: 2023-03-27 | Resolved: 2024-05-15

## 2024-05-15 PROBLEM — H04.569 STENOSIS OF LACRIMAL PUNCTUM: Status: RESOLVED | Noted: 2019-05-16 | Resolved: 2024-05-15

## 2024-05-15 PROBLEM — N17.9 ACUTE RENAL FAILURE (CMS-HCC): Status: RESOLVED | Noted: 2023-03-27 | Resolved: 2024-05-15

## 2024-05-15 PROBLEM — E66.9 OBESITY: Status: RESOLVED | Noted: 2023-03-27 | Resolved: 2024-05-15

## 2024-05-15 PROBLEM — N89.8 VAGINAL IRRITATION: Status: RESOLVED | Noted: 2024-05-15 | Resolved: 2024-05-15

## 2024-05-15 PROBLEM — N18.32 STAGE 3B CHRONIC KIDNEY DISEASE (MULTI): Status: RESOLVED | Noted: 2023-08-30 | Resolved: 2024-05-15

## 2024-05-15 PROBLEM — K59.00 CONSTIPATION: Status: RESOLVED | Noted: 2022-02-17 | Resolved: 2024-05-15

## 2024-05-15 PROBLEM — M25.669 KNEE STIFFNESS: Status: RESOLVED | Noted: 2023-01-16 | Resolved: 2024-05-15

## 2024-05-15 PROBLEM — G44.309 POST-TRAUMATIC HEADACHE: Status: RESOLVED | Noted: 2024-02-13 | Resolved: 2024-05-15

## 2024-05-15 PROBLEM — M97.9XXA FRACTURE OF BONE ADJACENT TO PROSTHESIS: Status: RESOLVED | Noted: 2022-02-14 | Resolved: 2024-05-15

## 2024-05-15 PROBLEM — Z98.890 HISTORY OF CARDIOVASCULAR SURGERY: Status: RESOLVED | Noted: 2024-02-13 | Resolved: 2024-05-15

## 2024-05-15 PROBLEM — S69.92XA UNSPECIFIED INJURY OF LEFT WRIST, HAND AND FINGER(S), INITIAL ENCOUNTER: Status: RESOLVED | Noted: 2023-02-06 | Resolved: 2024-05-15

## 2024-05-15 RX ORDER — OMEPRAZOLE 40 MG/1
40 CAPSULE, DELAYED RELEASE ORAL
COMMUNITY
Start: 2024-04-03

## 2024-05-17 ENCOUNTER — APPOINTMENT (OUTPATIENT)
Dept: PRIMARY CARE | Facility: CLINIC | Age: 74
End: 2024-05-17
Payer: MEDICARE

## 2024-05-17 NOTE — PROGRESS NOTES
[facility]: [facility]  PROGRESS NOTE      Daniela Epstein is a 73 y.o. female that is presenting today for Follow-up.    Assessment/Plan   Diagnoses and all orders for this visit:  Type 2 diabetes mellitus without complication, with long-term current use of insulin (Multi)  -     glipiZIDE (Glucotrol) 5 mg tablet; Take 1 tablet (5 mg) by mouth once daily.  Anemia, unspecified type  Bipolar affective disorder, current episode depressed, current episode severity unspecified (Multi)  Diabetic peripheral neuropathy (Multi)  Gastroesophageal reflux disease without esophagitis  Primary osteoarthritis, unspecified site  Primary hypertension  Type 2 diabetes mellitus with stage 3a chronic kidney disease, with long-term current use of insulin (Multi)  Vitamin D deficiency  Overactive bladder  Contusion of right side of back, initial encounter  -     Follow Up In Primary Care - Health Maintenance  Stage 4 chronic kidney disease (Multi)  -     Referral to Nephrology; Future  Type 2 diabetes mellitus without complication, without long-term current use of insulin (Multi)  -     insulin glargine (Basaglar KwikPen U-100 Insulin) 100 unit/mL (3 mL) pen; Inject 16 Units under the skin once daily at bedtime. Take as directed per insulin instructions.  Other orders  -     Follow Up In Primary Care - Established  -     Follow Up In Primary Care - Established; Future  We reviewed her whole situation together as noted above.  Here the most specific updates.  1.  Diabetes.  It does seem like we have gone in the wrong direction since we tried to optimize her regimen.  I am going to stop the Ozempic.  I will continue the Basaglar (at 12 units daily) and the Jardiance for now and we will add back glipizide 5 mg daily to her regimen which really seem to be helpful for her.  Her A1c at that time was under 7.  2.  Hypertension.  She had reduced her lisinopril to the current dosage but she is tolerating this well and her  blood pressures look good.  3.  Chronic kidney disease.  She has she is sitting at a stage IV chronic kidney disease her ultrasound of her kidneys just showed some changes consistent with chronic kidney disease/medical disease.  I would like nephrology to start following as well right now.  I am hesitant to add the metformin back because of this particular finding.    Because things have been so chaotic and her sugars are well over 200 I am going to see her back in about 2 weeks to see how she is doing on her new program.  Subjective   She is here for her follow-up appointment.  At her last visit we had tried to streamline her diabetes medications.  We felt it would be best to add an SGLT2 and we added Jardiance to her medical regimen.  She had been taken off of metformin because of chronic kidney disease and had also been taken off of glipizide in favor of what seemed to be more logical treatment regimen.  Since doing so however her sugars have been very poorly controlled.  Because her sugars were poorly controlled we added some Basaglar insulin at a dose of 12 units a day which then titrated up to 16 units/day and she continues to have very high sugars sometimes in the upper 200s.  We added some Ozempic to her regimen but despite this she really does not feel that great.  She feels nauseated at times and she feels lightheaded and she feels like she is really generally gone in the wrong direction.      Review of Systems   Objective   Vitals:    05/20/24 1545   BP: 114/62   Pulse: 59   Temp: 36.1 °C (97 °F)   SpO2: 96%      Body mass index is 33.48 kg/m².  Physical Exam  Cardiovascular:      Rate and Rhythm: Regular rhythm. Tachycardia present.      Heart sounds: Normal heart sounds.   Pulmonary:      Effort: Pulmonary effort is normal.      Breath sounds: Normal breath sounds.   Abdominal:      General: Abdomen is flat. Bowel sounds are normal.      Palpations: Abdomen is soft.   Musculoskeletal:      Cervical  "back: Normal range of motion and neck supple.   Skin:     General: Skin is warm.   Neurological:      General: No focal deficit present.      Comments: Feels lightheaded at times - very nonspecific       Diagnostic Results   Lab Results   Component Value Date    GLUCOSE 166 (H) 05/07/2024    CALCIUM 9.9 05/07/2024     05/07/2024    K 4.3 05/07/2024    CO2 25 05/07/2024    CL 98 05/07/2024    BUN 32 (H) 05/07/2024    CREATININE 1.90 (H) 05/07/2024     Lab Results   Component Value Date    ALT 15 05/07/2024    AST 16 05/07/2024    GGT 18 09/03/2018    ALKPHOS 174 (H) 05/07/2024    BILITOT 0.3 05/07/2024     Lab Results   Component Value Date    WBC 7.7 05/07/2024    HGB 11.6 (L) 05/07/2024    HCT 37.1 05/07/2024    MCV 87 05/07/2024     05/07/2024     Lab Results   Component Value Date    CHOL 167 01/25/2024    CHOL 148 07/06/2023    CHOL 145 07/11/2022     Lab Results   Component Value Date    HDL 74.0 01/25/2024    HDL 65 07/06/2023    HDL 65 07/11/2022     Lab Results   Component Value Date    LDLCALC 68 01/25/2024    LDLCALC 59 (L) 07/06/2023    LDLCALC 51 (L) 07/11/2022     Lab Results   Component Value Date    TRIG 127 01/25/2024    TRIG 121 07/06/2023    TRIG 145 07/11/2022     No components found for: \"CHOLHDL\"  Lab Results   Component Value Date    HGBA1C 11.2 (H) 05/07/2024     Other labs not included in the list above were reviewed either before or during this encounter.    History    Past Medical History:   Diagnosis Date    Acoustic neuroma (Multi) 08/30/2023    Acute lower urinary tract infection 08/30/2023    Acute renal failure (CMS-HCC) 03/27/2023    Affective psychosis, bipolar (Multi) 08/30/2023    Age-related nuclear cataract, left eye 10/16/2015    Age-related nuclear cataract of left eye    Age-related nuclear cataract, right eye 10/16/2015    Age-related nuclear cataract of right eye    Anxiety disorder 08/30/2023    Bilateral dry eyes 08/30/2023    Bipolar disorder (Multi) " 08/30/2023    Breast lump 08/30/2023    Cardiac device in situ 08/30/2023    Cerebrovascular accident (Multi) 08/30/2023    Closed fracture of phalanx of great toe 02/08/2023    Constipation 02/17/2022    Depression 08/30/2023    Depression, unspecified 05/20/2014    Depression    Diabetes mellitus (Multi) 08/30/2023    Diabetes mellitus, type 2 (Multi) 08/30/2023    Dry eye syndrome of unspecified lacrimal gland 06/25/2015    Dry eye syndrome    Dysfunction of right eustachian tube 08/30/2023    Dyslipidemia 08/30/2023    Dysphagia 08/30/2023    Essential (primary) hypertension 12/15/2013    Hypertension    Essential (primary) hypertension 08/30/2023    Fibrocystic breast changes 08/30/2023    Fracture of bone adjacent to prosthesis 02/14/2022    Gastroesophageal reflux disease 08/30/2023    Hazy vision 08/30/2023    History of cardiovascular surgery 02/13/2024    Hx of breast cancer 06/08/2016    Hypokalemia 08/30/2023    Infection due to Escherichia coli 03/27/2023    Knee stiffness 01/16/2023    Mastodynia 08/30/2023    Mental disorder 08/30/2023    Mixed hyperlipidemia 08/30/2023    Myopia, bilateral 10/16/2015    High myopia, both eyes    Nausea & vomiting 08/30/2023    Nose injury 08/30/2023    Obesity 03/27/2023    Oral mucosal lesion 08/30/2023    Osteoarthritis 08/30/2023    Other disorders affecting eyelid function 06/25/2015    Excessive involuntary blinking    Overactive bladder 08/30/2023    Pain in left hand 02/06/2023    Personal history of diseases of the blood and blood-forming organs and certain disorders involving the immune mechanism     History of anemia    Personal history of other diseases of the digestive system     History of esophageal reflux    Personal history of other diseases of the nervous system and sense organs     History of cataract    Personal history of other diseases of urinary system     History of bladder problems    Personal history of other mental and behavioral disorders      History of mental disorder    Post-traumatic headache 02/13/2024    Pure hypercholesterolemia, unspecified 05/20/2014    High cholesterol    Rhinitis 08/30/2023    S/P knee replacement 08/30/2023    Snoring     Snoring    Solitary cyst of breast 08/30/2023    Stage 3b chronic kidney disease (Multi) 08/30/2023    Stenosis of lacrimal punctum 05/16/2019    Swelling, mass, or lump on face 08/30/2023    Syncope 08/30/2023    TMJ (temporomandibular joint syndrome) 08/30/2023    Type 2 diabetes mellitus without complications (Multi) 06/25/2015    Diabetes mellitus    Unspecified injury of left wrist, hand and finger(s), initial encounter 02/06/2023    Unsteady gait 08/30/2023    Vaginal irritation 05/15/2024    Vitamin D deficiency 08/30/2023    Weakness 03/27/2023     Past Surgical History:   Procedure Laterality Date    ANKLE SURGERY  1998    injury    APPENDECTOMY  1972    BLEPHAROPLASTY Bilateral 2010    BREAST BIOPSY  2011    BREAST LUMPECTOMY Right 2011    atypical hyperplasia    CARDIAC SURGERY  03/06/2023    implant cardiac event recorder for recurrent syncope/ Dr. Fowler    CARPAL TUNNEL RELEASE Left 2010    Neuroplasty Decompression Median Nerve At Carpal Tunnel    CARPAL TUNNEL RELEASE Right 2010    CHOLECYSTECTOMY  1979    COLONOSCOPY  2007    COLONOSCOPY  2013    COLONOSCOPY  2014    COLONOSCOPY  11/06/2020    CRANIOTOMY Right 2006    acoustic neuroma    CT ANGIO NECK  03/22/2023    CT NECK ANGIO W AND WO IV CONTRAST [facility] [place] [facility] CT    CT HEAD ANGIO W AND WO IV CONTRAST  03/22/2023    CT HEAD ANGIO W AND WO IV CONTRAST [facility] [place] CT    DILATION AND CURETTAGE OF UTERUS  1971    DILATION AND CURETTAGE OF UTERUS  2013    ESOPHAGOGASTRODUODENOSCOPY  2007    ESOPHAGOGASTRODUODENOSCOPY  2010    ESOPHAGOGASTRODUODENOSCOPY  2020    HEMORRHOID SURGERY  1990    KNEE SURGERY Right 2008    scar tissue    MOUTH SURGERY Right 2017    right buccal lesion    MR HEAD ANGIO WO IV CONTRAST  01/18/2015    MR HEAD ANGIO WO IV CONTRAST [facility]    MR HEAD ANGIO WO IV CONTRAST  05/08/2016    MR HEAD ANGIO WO IV CONTRAST [facility]    MR HEAD ANGIO WO IV CONTRAST  02/28/2017    MR HEAD ANGIO WO IV CONTRAST [facility]    MR NECK ANGIO WO IV CONTRAST  01/18/2015    MR NECK ANGIO WO IV CONTRAST [facility]    NASAL SINUS SURGERY  1985    ORIF FEMUR FRACTURE Right 2022    OVARIAN CYST REMOVAL  1972    SEPTOPLASTY  05/20/2014    Septoplasty    TONSILLECTOMY      TOTAL KNEE ARTHROPLASTY Right 2007    TOTAL KNEE ARTHROPLASTY Left 2011    TUBAL LIGATION  1983    URETHRAL SLING  2013     Family History   Problem Relation Name Age of Onset    Arthritis Mother      Breast cancer Mother      Diabetes Mother      Hypertension Mother      Heart failure Father      Heart disease Father      Arthritis Other SIBLINGS     Diabetes Other SIBLINGS     Hypertension Other SIBLINGS      Social History     Socioeconomic History    Marital status:      Spouse name: Not on file    Number of children: Not on file    Years of education: Not on file    Highest education level: Not on file   Occupational History    Not on file   Tobacco Use    Smoking status: Never    Smokeless tobacco: Never   Vaping Use    Vaping status: Never Used   Substance and Sexual Activity    Alcohol use: Never    Drug use: Never    Sexual activity: Not on file   Other Topics Concern    Not on file   Social History Narrative    Not on file     Social Determinants of Health     Financial Resource Strain: Not on file   Food Insecurity: Not on file   Transportation Needs: Not on file   Physical Activity: Not on file   Stress: Not on file   Social Connections: Not on file   Intimate Partner Violence: Not on file   Housing Stability: Not on file     Allergies   Allergen Reactions    Other Other     TARTICK DYSKENESIA    Phenothiazines Hives, Shortness of breath, Other and Unknown     Slurred speech    Erythromycin Hives, Unknown and Rash    Haloperidol Unknown  "    Current Outpatient Medications on File Prior to Visit   Medication Sig Dispense Refill    atorvastatin (Lipitor) 40 mg tablet Take 1 tablet (40 mg) by mouth once daily.      blood sugar diagnostic strip Inject under the skin once daily.      cholecalciferol (Vitamin D3) 25 MCG (1000 UT) tablet Take 2 tablets (50 mcg) by mouth once daily.      docusate sodium (Colace) 100 mg capsule Take 1 capsule (100 mg) by mouth once daily as needed.      ferrous sulfate 325 (65 Fe) MG EC tablet Take 1 tablet by mouth. Every other week      fluticasone (Flonase) 50 mcg/actuation nasal spray Administer 1 spray into each nostril once daily as needed.      hydroCHLOROthiazide (HYDRODiuril) 12.5 mg tablet Take 1 tablet (12.5 mg) by mouth once daily in the morning.      imipramine (Tofranil) 25 mg tablet TAKE 1 TABLET BY MOUTH ONCE A DAY AT BEDTIME 90 tablet 1    lamoTRIgine (LaMICtal) 200 mg tablet Take 1 tablet (200 mg) by mouth once daily. ODT?      lisinopril 20 mg tablet Take 1 tablet (20 mg) by mouth once daily. (Patient taking differently: Take 0.5 tablets (10 mg) by mouth once daily.) 90 tablet 3    loperamide (Imodium A-D) 2 mg tablet Take 1 tablet (2 mg) by mouth 4 times a day as needed.      loratadine (Claritin) 10 mg tablet Take 1 tablet (10 mg) by mouth if needed.      omeprazole (PriLOSEC) 40 mg DR capsule Take 1 capsule (40 mg) by mouth once daily in the morning. Take before meals.      pen needle, diabetic 32 gauge x 5/32\" needle Use daily with insulin injection. 100 each 1    polyethylene glycol (Miralax) 17 gram packet Take 17 g by mouth once daily as needed. MIX WITH 8 OZ FLUID      semaglutide 0.25 mg or 0.5 mg (2 mg/3 mL) pen injector Inject 0.25 mg under the skin 1 (one) time per week. 3 mL 3    sertraline (Zoloft) 50 mg tablet Take 1 tablet (50 mg) by mouth once daily.      tiZANidine (Zanaflex) 4 mg tablet Take 1 tablet (4 mg) by mouth 2 times a day as needed (HEAD OR NECK PAIN).      [DISCONTINUED] insulin " glargine (Basaglar KwikPen U-100 Insulin) 100 unit/mL (3 mL) pen Inject 12 Units under the skin once daily at bedtime. Take as directed per insulin instructions. (Patient taking differently: Inject 16 Units under the skin once daily at bedtime. Take as directed per insulin instructions.) 6 mL 3    empagliflozin (Jardiance) 25 mg Take 1 tablet (25 mg) by mouth once daily. 90 tablet 3    [DISCONTINUED] cyanocobalamin (Vitamin B-12) 1,000 mcg tablet Take 1 tablet (1,000 mcg) by mouth once daily.      [DISCONTINUED] empagliflozin (Jardiance) 10 mg Take 1 tablet (10 mg) by mouth once daily. 30 tablet 2    [DISCONTINUED] insulin glargine (Basaglar KwikPen U-100 Insulin) 100 unit/mL (3 mL) pen Inject 10 Units under the skin once daily at bedtime. Take as directed per insulin instructions. 3 mL 1    [DISCONTINUED] omeprazole (PriLOSEC) 20 mg DR capsule Take 2 capsules (40 mg) by mouth once daily.       No current facility-administered medications on file prior to visit.     Immunization History   Administered Date(s) Administered    DT (pediatric) 05/29/2007    Influenza, seasonal, injectable 10/29/2010, 11/25/2011    Pfizer Purple Cap SARS-CoV-2 03/31/2021, 04/24/2021, 11/04/2021    Pneumococcal conjugate vaccine, 13-valent (PREVNAR 13) 05/23/2018    Pneumococcal conjugate vaccine, 20-valent (PREVNAR 20) 07/12/2023    Pneumococcal polysaccharide vaccine, 23-valent, age 2 years and older (PNEUMOVAX 23) 10/29/2006    Tdap vaccine, age 7 year and older (BOOSTRIX, ADACEL) 05/23/2018     Patient's medical history was reviewed and updated either before or during this encounter.       Pablo Montoya MD

## 2024-05-20 ENCOUNTER — OFFICE VISIT (OUTPATIENT)
Dept: PRIMARY CARE | Facility: CLINIC | Age: 74
End: 2024-05-20
Payer: MEDICARE

## 2024-05-20 VITALS
SYSTOLIC BLOOD PRESSURE: 114 MMHG | DIASTOLIC BLOOD PRESSURE: 62 MMHG | WEIGHT: 189 LBS | HEIGHT: 63 IN | TEMPERATURE: 97 F | OXYGEN SATURATION: 96 % | HEART RATE: 59 BPM | BODY MASS INDEX: 33.49 KG/M2

## 2024-05-20 DIAGNOSIS — E11.9 TYPE 2 DIABETES MELLITUS WITHOUT COMPLICATION, WITHOUT LONG-TERM CURRENT USE OF INSULIN (MULTI): ICD-10-CM

## 2024-05-20 DIAGNOSIS — M19.91 PRIMARY OSTEOARTHRITIS, UNSPECIFIED SITE: ICD-10-CM

## 2024-05-20 DIAGNOSIS — E11.22 TYPE 2 DIABETES MELLITUS WITH STAGE 3A CHRONIC KIDNEY DISEASE, WITH LONG-TERM CURRENT USE OF INSULIN (MULTI): ICD-10-CM

## 2024-05-20 DIAGNOSIS — E11.9 TYPE 2 DIABETES MELLITUS WITHOUT COMPLICATION, WITH LONG-TERM CURRENT USE OF INSULIN (MULTI): Primary | ICD-10-CM

## 2024-05-20 DIAGNOSIS — E11.42 DIABETIC PERIPHERAL NEUROPATHY (MULTI): ICD-10-CM

## 2024-05-20 DIAGNOSIS — K21.9 GASTROESOPHAGEAL REFLUX DISEASE WITHOUT ESOPHAGITIS: ICD-10-CM

## 2024-05-20 DIAGNOSIS — D64.9 ANEMIA, UNSPECIFIED TYPE: ICD-10-CM

## 2024-05-20 DIAGNOSIS — Z79.4 TYPE 2 DIABETES MELLITUS WITH STAGE 3A CHRONIC KIDNEY DISEASE, WITH LONG-TERM CURRENT USE OF INSULIN (MULTI): ICD-10-CM

## 2024-05-20 DIAGNOSIS — N18.31 TYPE 2 DIABETES MELLITUS WITH STAGE 3A CHRONIC KIDNEY DISEASE, WITH LONG-TERM CURRENT USE OF INSULIN (MULTI): ICD-10-CM

## 2024-05-20 DIAGNOSIS — E55.9 VITAMIN D DEFICIENCY: ICD-10-CM

## 2024-05-20 DIAGNOSIS — F31.30 BIPOLAR AFFECTIVE DISORDER, CURRENT EPISODE DEPRESSED, CURRENT EPISODE SEVERITY UNSPECIFIED (MULTI): ICD-10-CM

## 2024-05-20 DIAGNOSIS — Z79.4 TYPE 2 DIABETES MELLITUS WITHOUT COMPLICATION, WITH LONG-TERM CURRENT USE OF INSULIN (MULTI): Primary | ICD-10-CM

## 2024-05-20 DIAGNOSIS — S20.221A CONTUSION OF RIGHT SIDE OF BACK, INITIAL ENCOUNTER: ICD-10-CM

## 2024-05-20 DIAGNOSIS — N32.81 OVERACTIVE BLADDER: ICD-10-CM

## 2024-05-20 DIAGNOSIS — I10 PRIMARY HYPERTENSION: ICD-10-CM

## 2024-05-20 DIAGNOSIS — N18.4 STAGE 4 CHRONIC KIDNEY DISEASE (MULTI): ICD-10-CM

## 2024-05-20 PROCEDURE — G2211 COMPLEX E/M VISIT ADD ON: HCPCS | Performed by: INTERNAL MEDICINE

## 2024-05-20 PROCEDURE — 3046F HEMOGLOBIN A1C LEVEL >9.0%: CPT | Performed by: INTERNAL MEDICINE

## 2024-05-20 PROCEDURE — 1159F MED LIST DOCD IN RCRD: CPT | Performed by: INTERNAL MEDICINE

## 2024-05-20 PROCEDURE — 3078F DIAST BP <80 MM HG: CPT | Performed by: INTERNAL MEDICINE

## 2024-05-20 PROCEDURE — 3048F LDL-C <100 MG/DL: CPT | Performed by: INTERNAL MEDICINE

## 2024-05-20 PROCEDURE — 99214 OFFICE O/P EST MOD 30 MIN: CPT | Performed by: INTERNAL MEDICINE

## 2024-05-20 PROCEDURE — 4010F ACE/ARB THERAPY RXD/TAKEN: CPT | Performed by: INTERNAL MEDICINE

## 2024-05-20 PROCEDURE — 3074F SYST BP LT 130 MM HG: CPT | Performed by: INTERNAL MEDICINE

## 2024-05-20 PROCEDURE — 1160F RVW MEDS BY RX/DR IN RCRD: CPT | Performed by: INTERNAL MEDICINE

## 2024-05-20 PROCEDURE — 1125F AMNT PAIN NOTED PAIN PRSNT: CPT | Performed by: INTERNAL MEDICINE

## 2024-05-20 RX ORDER — INSULIN GLARGINE 100 [IU]/ML
16 INJECTION, SOLUTION SUBCUTANEOUS NIGHTLY
Start: 2024-05-20 | End: 2024-05-22 | Stop reason: SDUPTHER

## 2024-05-20 RX ORDER — GLIPIZIDE 5 MG/1
5 TABLET ORAL DAILY
Qty: 30 TABLET | Refills: 11 | Status: SHIPPED | OUTPATIENT
Start: 2024-05-20 | End: 2024-06-10 | Stop reason: DRUGHIGH

## 2024-05-20 ASSESSMENT — ENCOUNTER SYMPTOMS
OCCASIONAL FEELINGS OF UNSTEADINESS: 1
DEPRESSION: 0
LOSS OF SENSATION IN FEET: 0

## 2024-05-20 ASSESSMENT — PAIN SCALES - GENERAL: PAINLEVEL: 2

## 2024-05-20 NOTE — PATIENT INSTRUCTIONS
Mrs. Bacon Karolina to make the following changes.  1.  Restart glipizide 5 mg daily with breakfast.  2. Continue the Jardiance at a dose of 25 mg daily.  3. Continue the Basaglar insulin at a dose of 12 units daily.  4.  Stop Ozempic.  5.  Stay off metformin.    I will see you back in 2 weeks.  Have also made a referral for you to see the kidney doctor regarding your chronic kidney disease from your blood pressure and your diabetes.    Please continue to take your sugars twice a day as you currently are doing that these are very helpful to us understanding your current problem.

## 2024-05-22 DIAGNOSIS — E11.9 TYPE 2 DIABETES MELLITUS WITHOUT COMPLICATION, WITHOUT LONG-TERM CURRENT USE OF INSULIN (MULTI): ICD-10-CM

## 2024-05-22 RX ORDER — INSULIN GLARGINE 100 [IU]/ML
12 INJECTION, SOLUTION SUBCUTANEOUS NIGHTLY
Qty: 3 ML | Refills: 3 | Status: SHIPPED | OUTPATIENT
Start: 2024-05-22 | End: 2024-06-10 | Stop reason: ALTCHOICE

## 2024-06-07 PROBLEM — R10.9 ABDOMINAL PAIN: Status: RESOLVED | Noted: 2024-06-07 | Resolved: 2024-06-07

## 2024-06-07 PROBLEM — M25.559 HIP PAIN: Status: RESOLVED | Noted: 2024-06-07 | Resolved: 2024-06-07

## 2024-06-07 PROBLEM — N39.0 ACUTE URINARY TRACT INFECTION: Status: RESOLVED | Noted: 2023-03-27 | Resolved: 2024-06-07

## 2024-06-07 PROBLEM — I63.9 CEREBROVASCULAR ACCIDENT (CVA) (MULTI): Status: RESOLVED | Noted: 2024-06-07 | Resolved: 2024-06-07

## 2024-06-07 PROBLEM — H60.509 ACUTE OTITIS EXTERNA: Status: RESOLVED | Noted: 2024-06-07 | Resolved: 2024-06-07

## 2024-06-07 PROBLEM — E86.0 DEHYDRATION: Status: RESOLVED | Noted: 2024-06-07 | Resolved: 2024-06-07

## 2024-06-09 ASSESSMENT — ENCOUNTER SYMPTOMS
WEIGHT LOSS: 0
FATIGUE: 0
DIZZINESS: 0
SWEATS: 0
POLYPHAGIA: 1
BLURRED VISION: 1
TREMORS: 0
POLYDIPSIA: 0
CONFUSION: 0
BLACKOUTS: 0
SEIZURES: 0
HUNGER: 0
VISUAL CHANGE: 0
NERVOUS/ANXIOUS: 0
HEADACHES: 0
WEAKNESS: 0
SPEECH DIFFICULTY: 0

## 2024-06-10 ENCOUNTER — OFFICE VISIT (OUTPATIENT)
Dept: PRIMARY CARE | Facility: CLINIC | Age: 74
End: 2024-06-10
Payer: MEDICARE

## 2024-06-10 ENCOUNTER — LAB (OUTPATIENT)
Dept: LAB | Facility: LAB | Age: 74
End: 2024-06-10
Payer: MEDICARE

## 2024-06-10 VITALS
WEIGHT: 192 LBS | HEIGHT: 63 IN | TEMPERATURE: 97 F | OXYGEN SATURATION: 98 % | HEART RATE: 68 BPM | DIASTOLIC BLOOD PRESSURE: 84 MMHG | BODY MASS INDEX: 34.02 KG/M2 | SYSTOLIC BLOOD PRESSURE: 128 MMHG

## 2024-06-10 DIAGNOSIS — E78.2 MIXED HYPERLIPIDEMIA: ICD-10-CM

## 2024-06-10 DIAGNOSIS — I10 PRIMARY HYPERTENSION: ICD-10-CM

## 2024-06-10 DIAGNOSIS — E11.22 TYPE 2 DIABETES MELLITUS WITH STAGE 3A CHRONIC KIDNEY DISEASE, WITH LONG-TERM CURRENT USE OF INSULIN (MULTI): Primary | ICD-10-CM

## 2024-06-10 DIAGNOSIS — F32.A DEPRESSION, UNSPECIFIED DEPRESSION TYPE: ICD-10-CM

## 2024-06-10 DIAGNOSIS — N18.31 TYPE 2 DIABETES MELLITUS WITH STAGE 3A CHRONIC KIDNEY DISEASE, WITH LONG-TERM CURRENT USE OF INSULIN (MULTI): Primary | ICD-10-CM

## 2024-06-10 DIAGNOSIS — Z79.4 TYPE 2 DIABETES MELLITUS WITH STAGE 3A CHRONIC KIDNEY DISEASE, WITH LONG-TERM CURRENT USE OF INSULIN (MULTI): Primary | ICD-10-CM

## 2024-06-10 DIAGNOSIS — Z79.4 TYPE 2 DIABETES MELLITUS WITHOUT COMPLICATION, WITH LONG-TERM CURRENT USE OF INSULIN (MULTI): ICD-10-CM

## 2024-06-10 DIAGNOSIS — E11.42 DIABETIC PERIPHERAL NEUROPATHY (MULTI): ICD-10-CM

## 2024-06-10 DIAGNOSIS — E11.9 TYPE 2 DIABETES MELLITUS WITHOUT COMPLICATION, WITH LONG-TERM CURRENT USE OF INSULIN (MULTI): ICD-10-CM

## 2024-06-10 DIAGNOSIS — D64.9 ANEMIA, UNSPECIFIED TYPE: ICD-10-CM

## 2024-06-10 LAB
ANION GAP SERPL CALC-SCNC: 13 MMOL/L
BASOPHILS # BLD AUTO: 0.13 X10*3/UL (ref 0–0.1)
BASOPHILS NFR BLD AUTO: 1.8 %
BUN SERPL-MCNC: 30 MG/DL (ref 8–25)
CALCIUM SERPL-MCNC: 9.2 MG/DL (ref 8.5–10.4)
CHLORIDE SERPL-SCNC: 96 MMOL/L (ref 97–107)
CO2 SERPL-SCNC: 27 MMOL/L (ref 24–31)
CREAT SERPL-MCNC: 1.9 MG/DL (ref 0.4–1.6)
EGFRCR SERPLBLD CKD-EPI 2021: 28 ML/MIN/1.73M*2
EOSINOPHIL # BLD AUTO: 0.2 X10*3/UL (ref 0–0.4)
EOSINOPHIL NFR BLD AUTO: 2.7 %
ERYTHROCYTE [DISTWIDTH] IN BLOOD BY AUTOMATED COUNT: 13.3 % (ref 11.5–14.5)
GLUCOSE SERPL-MCNC: 155 MG/DL (ref 65–99)
HCT VFR BLD AUTO: 36.5 % (ref 36–46)
HGB BLD-MCNC: 11.3 G/DL (ref 12–16)
IMM GRANULOCYTES # BLD AUTO: 0.05 X10*3/UL (ref 0–0.5)
IMM GRANULOCYTES NFR BLD AUTO: 0.7 % (ref 0–0.9)
LYMPHOCYTES # BLD AUTO: 1.87 X10*3/UL (ref 0.8–3)
LYMPHOCYTES NFR BLD AUTO: 25.5 %
MCH RBC QN AUTO: 27.4 PG (ref 26–34)
MCHC RBC AUTO-ENTMCNC: 31 G/DL (ref 32–36)
MCV RBC AUTO: 88 FL (ref 80–100)
MONOCYTES # BLD AUTO: 0.63 X10*3/UL (ref 0.05–0.8)
MONOCYTES NFR BLD AUTO: 8.6 %
NEUTROPHILS # BLD AUTO: 4.46 X10*3/UL (ref 1.6–5.5)
NEUTROPHILS NFR BLD AUTO: 60.7 %
NRBC BLD-RTO: 0 /100 WBCS (ref 0–0)
PLATELET # BLD AUTO: 382 X10*3/UL (ref 150–450)
POTASSIUM SERPL-SCNC: 4.8 MMOL/L (ref 3.4–5.1)
RBC # BLD AUTO: 4.13 X10*6/UL (ref 4–5.2)
SODIUM SERPL-SCNC: 136 MMOL/L (ref 133–145)
WBC # BLD AUTO: 7.3 X10*3/UL (ref 4.4–11.3)

## 2024-06-10 PROCEDURE — 85025 COMPLETE CBC W/AUTO DIFF WBC: CPT

## 2024-06-10 PROCEDURE — 1126F AMNT PAIN NOTED NONE PRSNT: CPT | Performed by: INTERNAL MEDICINE

## 2024-06-10 PROCEDURE — 4010F ACE/ARB THERAPY RXD/TAKEN: CPT | Performed by: INTERNAL MEDICINE

## 2024-06-10 PROCEDURE — 36415 COLL VENOUS BLD VENIPUNCTURE: CPT

## 2024-06-10 PROCEDURE — G2211 COMPLEX E/M VISIT ADD ON: HCPCS | Performed by: INTERNAL MEDICINE

## 2024-06-10 PROCEDURE — 1159F MED LIST DOCD IN RCRD: CPT | Performed by: INTERNAL MEDICINE

## 2024-06-10 PROCEDURE — 3048F LDL-C <100 MG/DL: CPT | Performed by: INTERNAL MEDICINE

## 2024-06-10 PROCEDURE — 3079F DIAST BP 80-89 MM HG: CPT | Performed by: INTERNAL MEDICINE

## 2024-06-10 PROCEDURE — 80048 BASIC METABOLIC PNL TOTAL CA: CPT

## 2024-06-10 PROCEDURE — 3074F SYST BP LT 130 MM HG: CPT | Performed by: INTERNAL MEDICINE

## 2024-06-10 PROCEDURE — 99213 OFFICE O/P EST LOW 20 MIN: CPT | Performed by: INTERNAL MEDICINE

## 2024-06-10 PROCEDURE — 3046F HEMOGLOBIN A1C LEVEL >9.0%: CPT | Performed by: INTERNAL MEDICINE

## 2024-06-10 RX ORDER — GLIPIZIDE 5 MG/1
5 TABLET ORAL
Qty: 60 TABLET | Refills: 11 | Status: SHIPPED | OUTPATIENT
Start: 2024-06-10 | End: 2025-06-10

## 2024-06-10 RX ORDER — ATORVASTATIN CALCIUM 40 MG/1
40 TABLET, FILM COATED ORAL DAILY
Qty: 90 TABLET | Refills: 3 | Status: SHIPPED | OUTPATIENT
Start: 2024-06-10 | End: 2025-06-10

## 2024-06-10 RX ORDER — IMIPRAMINE HYDROCHLORIDE 25 MG/1
25 TABLET, FILM COATED ORAL NIGHTLY
Qty: 90 TABLET | Refills: 1 | Status: SHIPPED | OUTPATIENT
Start: 2024-06-10

## 2024-06-10 RX ORDER — GLIPIZIDE 5 MG/1
5 TABLET ORAL
Qty: 60 TABLET | Refills: 11 | Status: SHIPPED | OUTPATIENT
Start: 2024-06-10 | End: 2024-06-10 | Stop reason: SDUPTHER

## 2024-06-10 ASSESSMENT — ENCOUNTER SYMPTOMS
WEAKNESS: 0
DIZZINESS: 0
POLYPHAGIA: 1
SEIZURES: 0
VISUAL CHANGE: 0
POLYDIPSIA: 0
HUNGER: 0
FATIGUE: 0
TREMORS: 0
LOSS OF SENSATION IN FEET: 0
BLACKOUTS: 0
BLURRED VISION: 1
OCCASIONAL FEELINGS OF UNSTEADINESS: 1
DEPRESSION: 0
NERVOUS/ANXIOUS: 0
HEADACHES: 0
WEIGHT LOSS: 0
SWEATS: 0
CONFUSION: 0
SPEECH DIFFICULTY: 0

## 2024-06-10 ASSESSMENT — PAIN SCALES - GENERAL: PAINLEVEL: 0-NO PAIN

## 2024-06-10 NOTE — PATIENT INSTRUCTIONS
We are going to make a couple of adjustments today first of all you can stop your insulin.  Secondly go ahead and increase your glipizide to 5 mg twice a day taking it before breakfast and before dinner.  I will see you back in another couple of weeks I will let you know if your blood work shows anything exciting.

## 2024-06-10 NOTE — PROGRESS NOTES
[facility]: [facility]  PROGRESS NOTE      Daniela Epstein is a 73 y.o. female that is presenting today for Follow-up.    Assessment/Plan   Diagnoses and all orders for this visit:  Type 2 diabetes mellitus with stage 3a chronic kidney disease, with long-term current use of insulin (Multi)  Mixed hyperlipidemia  -     atorvastatin (Lipitor) 40 mg tablet; Take 1 tablet (40 mg) by mouth once daily.  Anemia, unspecified type  Diabetic peripheral neuropathy (Multi)  Primary hypertension  -     Basic Metabolic Panel; Future  -     CBC and Auto Differential; Future  Type 2 diabetes mellitus without complication, with long-term current use of insulin (Multi)  -     glipiZIDE (Glucotrol) 5 mg tablet; Take 1 tablet (5 mg) by mouth 2 times a day before meals.  Depression, unspecified depression type  -     imipramine (Tofranil) 25 mg tablet; Take 1 tablet (25 mg) by mouth once daily at bedtime.  Other orders  -     Follow Up In Primary Care - Established  -     Follow Up In Primary Care - Established; Future  We reviewed the current situation.  We also think an exacerbating factor here had been that her  had been very sick and actually has just passed away.  We think that stress probably was having an impact on her blood sugars as well.    She really strongly desires to be off of insulin.  We are going to try the following adjustments today we are going to stop the Basaglar and increase the glipizide to 5 mg twice a day instead she will continue to check her sugars twice a day as she has been doing and will bring them to me again in about 2 weeks.  By then she will see nephrology as well    I did order some blood work today to see how her chronic kidney disease is doing on the adjusted medications.  Subjective   She sees me for follow-up.  We were very concerned about her blood sugars at her last visit as she did not seem to be responding well to the program we had put in place at that time.  We  had at that time decided to leave her Basaglar at 12 units/day (the Jardiance added glipizide 5 mg/day and omitted the Ozempic.  She been taking her sugars twice a day since that time and doing okay in general.  She also has an appointment coming up with nephrology.    She brings along her blood sugars today which we are reviewing together her fasting sugars have been ranging from 80-1 70 and her sugars before dinner have ranged from 10 2-3 61 (after a milkshake)    Diabetes  She has type 2 diabetes mellitus. No MedicAlert identification noted. The initial diagnosis of diabetes was made 20 years ago. Pertinent negatives for hypoglycemia include no confusion, dizziness, headaches, hunger, mood changes, nervousness/anxiousness, pallor, seizures, sleepiness, speech difficulty, sweats or tremors. Associated symptoms include blurred vision, foot paresthesias and polyphagia. Pertinent negatives for diabetes include no chest pain, no fatigue, no foot ulcerations, no polydipsia, no polyuria, no visual change, no weakness and no weight loss. Hypoglycemia complications include required assistance. Pertinent negatives for hypoglycemia complications include no blackouts, no hospitalization, no nocturnal hypoglycemia and no required glucagon injection. Symptoms are stable. Diabetic complications include nephropathy. Pertinent negatives for diabetic complications include no CVA, heart disease, impotence, peripheral neuropathy, PVD or retinopathy. Current diabetic treatment includes insulin injections and oral agent (monotherapy). She is compliant with treatment all of the time. She is currently taking insulin at bedtime. Insulin injections are given by patient. Rotation sites for injection include the abdominal wall. Her weight is fluctuating minimally. When asked about meal planning, she reported none. She has not had a previous visit with a dietitian. She participates in exercise intermittently. She monitors blood glucose at  home 1-2 x per day. Blood glucose monitoring compliance is inadequate. Her home blood glucose trend is decreasing steadily. Her breakfast blood glucose range is generally 140-180 mg/dl. Her dinner blood glucose is taken between 5-6 pm. Her dinner blood glucose range is generally >200 mg/dl. Her overall blood glucose range is 140-180 mg/dl. She sees a podiatrist.Eye exam is current.     Review of Systems   Constitutional:  Negative for fatigue and weight loss.   Eyes:  Positive for blurred vision.   Cardiovascular:  Negative for chest pain.   Endocrine: Positive for polyphagia. Negative for polydipsia and polyuria.   Genitourinary:  Negative for impotence.   Skin:  Negative for pallor.   Neurological:  Negative for dizziness, tremors, seizures, speech difficulty, weakness and headaches.   Psychiatric/Behavioral:  Negative for confusion. The patient is not nervous/anxious.       Objective   Vitals:    06/10/24 1519   BP: 128/84   Pulse: 68   Temp: 36.1 °C (97 °F)   SpO2: 98%      Body mass index is 34.01 kg/m².  Physical Exam  Constitutional:       Appearance: Normal appearance.   HENT:      Head: Normocephalic.   Cardiovascular:      Rate and Rhythm: Normal rate and regular rhythm.      Pulses: Normal pulses.      Heart sounds: Normal heart sounds.   Pulmonary:      Effort: Pulmonary effort is normal.      Breath sounds: Normal breath sounds.   Abdominal:      General: Abdomen is flat. Bowel sounds are normal.      Palpations: Abdomen is soft.   Neurological:      Mental Status: She is alert.       Diagnostic Results   Lab Results   Component Value Date    GLUCOSE 166 (H) 05/07/2024    CALCIUM 9.9 05/07/2024     05/07/2024    K 4.3 05/07/2024    CO2 25 05/07/2024    CL 98 05/07/2024    BUN 32 (H) 05/07/2024    CREATININE 1.90 (H) 05/07/2024     Lab Results   Component Value Date    ALT 15 05/07/2024    AST 16 05/07/2024    GGT 18 09/03/2018    ALKPHOS 174 (H) 05/07/2024    BILITOT 0.3 05/07/2024     Lab Results  "  Component Value Date    WBC 7.7 05/07/2024    HGB 11.6 (L) 05/07/2024    HCT 37.1 05/07/2024    MCV 87 05/07/2024     05/07/2024     Lab Results   Component Value Date    CHOL 167 01/25/2024    CHOL 148 07/06/2023    CHOL 145 07/11/2022     Lab Results   Component Value Date    HDL 74.0 01/25/2024    HDL 65 07/06/2023    HDL 65 07/11/2022     Lab Results   Component Value Date    LDLCALC 68 01/25/2024    LDLCALC 59 (L) 07/06/2023    LDLCALC 51 (L) 07/11/2022     Lab Results   Component Value Date    TRIG 127 01/25/2024    TRIG 121 07/06/2023    TRIG 145 07/11/2022     No components found for: \"CHOLHDL\"  Lab Results   Component Value Date    HGBA1C 11.2 (H) 05/07/2024     Other labs not included in the list above were reviewed either before or during this encounter.    History    Past Medical History:   Diagnosis Date    Abdominal pain 06/07/2024    Acoustic neuroma (Multi) 08/30/2023    Acute lower urinary tract infection 08/30/2023    Acute otitis externa 06/07/2024    Acute renal failure ([facility]) 03/27/2023    Acute urinary tract infection 03/27/2023    Affective psychosis, bipolar (Multi) 08/30/2023    Age-related nuclear cataract, left eye 10/16/2015    Age-related nuclear cataract of left eye    Age-related nuclear cataract, right eye 10/16/2015    Age-related nuclear cataract of right eye    Allergic     Anemia     Anxiety     Anxiety disorder 08/30/2023    Arthritis     Bilateral dry eyes 08/30/2023    Bipolar disorder (Multi) 08/30/2023    Breast lump 08/30/2023    Cardiac device in situ 08/30/2023    Cerebrovascular accident (CVA) (Multi) 06/07/2024    Cerebrovascular accident (Multi) 08/30/2023    Closed fracture of phalanx of great toe 02/08/2023    Constipation 02/17/2022    Dehydration 06/07/2024    Depression 08/30/2023    Depression, unspecified 05/20/2014    Depression    Diabetes mellitus (Multi) 08/30/2023    Diabetes mellitus, type 2 (Multi) 08/30/2023    Dry eye syndrome of unspecified " lacrimal gland 06/25/2015    Dry eye syndrome    Dysfunction of right eustachian tube 08/30/2023    Dyslipidemia 08/30/2023    Dysphagia 08/30/2023    Essential (primary) hypertension 12/15/2013    Hypertension    Essential (primary) hypertension 08/30/2023    Fibrocystic breast changes 08/30/2023    Fracture of bone adjacent to prosthesis 02/14/2022    Gastroesophageal reflux disease 08/30/2023    Hazy vision 08/30/2023    Hip pain 06/07/2024    History of cardiovascular surgery 02/13/2024    Hx of breast cancer 06/08/2016    Hypokalemia 08/30/2023    Infection due to Escherichia coli 03/27/2023    Knee stiffness 01/16/2023    Mastodynia 08/30/2023    Mental disorder 08/30/2023    Mixed hyperlipidemia 08/30/2023    Myopia, bilateral 10/16/2015    High myopia, both eyes    Nausea & vomiting 08/30/2023    Nose injury 08/30/2023    Obesity 03/27/2023    Oral mucosal lesion 08/30/2023    Osteoarthritis 08/30/2023    Other disorders affecting eyelid function 06/25/2015    Excessive involuntary blinking    Overactive bladder 08/30/2023    Pain in left hand 02/06/2023    Personal history of diseases of the blood and blood-forming organs and certain disorders involving the immune mechanism     History of anemia    Personal history of other diseases of the digestive system     History of esophageal reflux    Personal history of other diseases of the nervous system and sense organs     History of cataract    Personal history of other diseases of urinary system     History of bladder problems    Personal history of other mental and behavioral disorders     History of mental disorder    Post-traumatic headache 02/13/2024    Pure hypercholesterolemia, unspecified 05/20/2014    High cholesterol    Rhinitis 08/30/2023    S/P knee replacement 08/30/2023    Severe myopia 05/07/2015    Snoring     Snoring    Solitary cyst of breast 08/30/2023    Stage 3b chronic kidney disease (Multi) 08/30/2023    Stenosis of lacrimal punctum  05/16/2019    Swelling, mass, or lump on face 08/30/2023    Syncope 08/30/2023    TMJ (temporomandibular joint syndrome) 08/30/2023    Type 2 diabetes mellitus without complications (Multi) 06/25/2015    Diabetes mellitus    Unspecified injury of left wrist, hand and finger(s), initial encounter 02/06/2023    Unsteady gait 08/30/2023    Vaginal irritation 05/15/2024    Vitamin D deficiency 08/30/2023    Weakness 03/27/2023     Past Surgical History:   Procedure Laterality Date    ANKLE SURGERY  1998    injury    APPENDECTOMY  1972    BLEPHAROPLASTY Bilateral 2010    BREAST BIOPSY  2011    BREAST LUMPECTOMY Right 2011    atypical hyperplasia    CARDIAC SURGERY  03/06/2023    implant cardiac event recorder for recurrent syncope/ Dr. Fowler    CARPAL TUNNEL RELEASE Left 2010    Neuroplasty Decompression Median Nerve At Carpal Tunnel    CARPAL TUNNEL RELEASE Right 2010    CHOLECYSTECTOMY  1979    COLONOSCOPY  2007    COLONOSCOPY  2013    COLONOSCOPY  2014    COLONOSCOPY  11/06/2020    CRANIOTOMY Right 2006    acoustic neuroma    CT ANGIO NECK  03/22/2023    CT NECK ANGIO W AND WO IV CONTRAST [facility] CT    CT HEAD ANGIO W AND WO IV CONTRAST  03/22/2023    CT HEAD ANGIO W AND WO IV CONTRAST [facility] CT    DILATION AND CURETTAGE OF UTERUS  1971    DILATION AND CURETTAGE OF UTERUS  2013    ESOPHAGOGASTRODUODENOSCOPY  2007    ESOPHAGOGASTRODUODENOSCOPY  2010    ESOPHAGOGASTRODUODENOSCOPY  2020    EYE SURGERY      FRACTURE SURGERY      HEMORRHOID SURGERY  1990    JOINT REPLACEMENT      KNEE SURGERY Right 2008    scar tissue    MOUTH SURGERY Right 2017    right buccal lesion    MR HEAD ANGIO WO IV CONTRAST  01/18/2015    MR HEAD ANGIO WO IV CONTRAST [facility]    MR HEAD ANGIO WO IV CONTRAST  05/08/2016    MR HEAD ANGIO WO IV CONTRAST [facility]    MR HEAD ANGIO WO IV CONTRAST  02/28/2017    MR HEAD ANGIO WO IV CONTRAST [facility]    MR NECK ANGIO WO IV CONTRAST  01/18/2015    MR NECK ANGIO WO IV  CONTRAST [facility]    NASAL SINUS SURGERY  1985    ORIF FEMUR FRACTURE Right 2022    OVARIAN CYST REMOVAL  1972    SEPTOPLASTY  05/20/2014    Septoplasty    TONSILLECTOMY      TOTAL KNEE ARTHROPLASTY Right 2007    TOTAL KNEE ARTHROPLASTY Left 2011    TUBAL LIGATION  1983    URETHRAL SLING  2013     Family History   Problem Relation Name Age of Onset    Arthritis Mother Paty     Breast cancer Mother Paty     Diabetes Mother Paty     Hypertension Mother Paty     Heart failure Father Luis     Heart disease Father Luis     Arthritis Other SIBLINGS     Diabetes Other SIBLINGS     Hypertension Other SIBLINGS      Social History     Socioeconomic History    Marital status:      Spouse name: Not on file    Number of children: Not on file    Years of education: Not on file    Highest education level: Not on file   Occupational History    Not on file   Tobacco Use    Smoking status: Never    Smokeless tobacco: Never   Vaping Use    Vaping status: Never Used   Substance and Sexual Activity    Alcohol use: Never    Drug use: Never    Sexual activity: Not on file   Other Topics Concern    Not on file   Social History Narrative    Not on file     Social Determinants of Health     Financial Resource Strain: Not on file   Food Insecurity: Not on file   Transportation Needs: Not on file   Physical Activity: Not on file   Stress: Not on file   Social Connections: Not on file   Intimate Partner Violence: Not on file   Housing Stability: Not on file     Allergies   Allergen Reactions    Other Other     TARTICK DYSKENESIA    Phenothiazines Hives, Shortness of breath, Other and Unknown     Slurred speech    Erythromycin Hives, Unknown and Rash    Haloperidol Unknown     Current Outpatient Medications on File Prior to Visit   Medication Sig Dispense Refill    blood sugar diagnostic strip Inject under the skin once daily.      cholecalciferol (Vitamin D3) 25 MCG (1000 UT) tablet Take 2 tablets (50 mcg) by mouth  "once daily.      docusate sodium (Colace) 100 mg capsule Take 1 capsule (100 mg) by mouth once daily as needed.      empagliflozin (Jardiance) 25 mg Take 1 tablet (25 mg) by mouth once daily. 90 tablet 3    ferrous sulfate 325 (65 Fe) MG EC tablet Take 1 tablet by mouth. Every other week      fluticasone (Flonase) 50 mcg/actuation nasal spray Administer 1 spray into each nostril once daily as needed.      hydroCHLOROthiazide (HYDRODiuril) 12.5 mg tablet Take 1 tablet (12.5 mg) by mouth once daily in the morning.      lamoTRIgine (LaMICtal) 200 mg tablet Take 1 tablet (200 mg) by mouth once daily. ODT?      lisinopril 20 mg tablet Take 1 tablet (20 mg) by mouth once daily. (Patient taking differently: Take 0.5 tablets (10 mg) by mouth once daily.) 90 tablet 3    loperamide (Imodium A-D) 2 mg tablet Take 1 tablet (2 mg) by mouth 4 times a day as needed.      loratadine (Claritin) 10 mg tablet Take 1 tablet (10 mg) by mouth if needed.      omeprazole (PriLOSEC) 40 mg DR capsule Take 1 capsule (40 mg) by mouth once daily in the morning. Take before meals.      pen needle, diabetic 32 gauge x 5/32\" needle Use daily with insulin injection. 100 each 1    polyethylene glycol (Miralax) 17 gram packet Take 17 g by mouth once daily as needed. MIX WITH 8 OZ FLUID      semaglutide 0.25 mg or 0.5 mg (2 mg/3 mL) pen injector Inject 0.25 mg under the skin 1 (one) time per week. 3 mL 3    sertraline (Zoloft) 50 mg tablet Take 1 tablet (50 mg) by mouth once daily.      tiZANidine (Zanaflex) 4 mg tablet Take 1 tablet (4 mg) by mouth 2 times a day as needed (HEAD OR NECK PAIN).      [DISCONTINUED] atorvastatin (Lipitor) 40 mg tablet Take 1 tablet (40 mg) by mouth once daily.      [DISCONTINUED] glipiZIDE (Glucotrol) 5 mg tablet Take 1 tablet (5 mg) by mouth once daily. 30 tablet 11    [DISCONTINUED] imipramine (Tofranil) 25 mg tablet TAKE 1 TABLET BY MOUTH ONCE A DAY AT BEDTIME 90 tablet 1    [DISCONTINUED] insulin glargine (Basaglar " KwikPen U-100 Insulin) 100 unit/mL (3 mL) pen Inject 12 Units under the skin once daily at bedtime. Take as directed per insulin instructions. 3 mL 3     No current facility-administered medications on file prior to visit.     Immunization History   Administered Date(s) Administered    DT (pediatric) 05/29/2007    Influenza, seasonal, injectable 10/29/2010, 11/25/2011    Pfizer Purple Cap SARS-CoV-2 03/31/2021, 04/24/2021, 11/04/2021    Pneumococcal conjugate vaccine, 13-valent (PREVNAR 13) 05/23/2018    Pneumococcal conjugate vaccine, 20-valent (PREVNAR 20) 07/12/2023    Pneumococcal polysaccharide vaccine, 23-valent, age 2 years and older (PNEUMOVAX 23) 10/29/2006    Tdap vaccine, age 7 year and older (BOOSTRIX, ADACEL) 05/23/2018     Patient's medical history was reviewed and updated either before or during this encounter.       Palbo Montoya MD

## 2024-06-11 ENCOUNTER — CLINICAL SUPPORT (OUTPATIENT)
Dept: AUDIOLOGY | Facility: CLINIC | Age: 74
End: 2024-06-11
Payer: MEDICARE

## 2024-06-11 DIAGNOSIS — H90.3 SENSORINEURAL HEARING LOSS, ASYMMETRICAL: Primary | ICD-10-CM

## 2024-06-11 PROCEDURE — 92557 COMPREHENSIVE HEARING TEST: CPT | Performed by: AUDIOLOGIST

## 2024-06-11 PROCEDURE — 92550 TYMPANOMETRY & REFLEX THRESH: CPT | Performed by: AUDIOLOGIST

## 2024-06-11 PROCEDURE — HRANC PR HEARING AID NO CHARGE: Performed by: AUDIOLOGIST

## 2024-06-11 ASSESSMENT — PAIN SCALES - GENERAL: PAINLEVEL_OUTOF10: 4

## 2024-06-11 ASSESSMENT — PAIN - FUNCTIONAL ASSESSMENT: PAIN_FUNCTIONAL_ASSESSMENT: 0-10

## 2024-06-11 ASSESSMENT — ENCOUNTER SYMPTOMS: OCCASIONAL FEELINGS OF UNSTEADINESS: 1

## 2024-06-11 NOTE — PROGRESS NOTES
AUDIOLOGY  HEARING AID APPOINTMENT    Name:  Daniela Epstein  :  1950  Age:  73 y.o.  Date of Service:  2024    Reason for visit: Ms. Epstein is seen in the clinic today for a hearing aid check and adjustment appointment. Patient complains that her she is having difficulty hearing with her hearing aids.    HISTORY  Hearing Aid History:  Patient wears binaural 2023 Phonak Audeo BiCROS (Audeo P90-R in the left ear and Audeo CROS P-R in the right ear) rechargeable  in the canal ([place]) hearing aids in silver gray with 2C  in the right ear, 1M  in the left ear, small open domes, and no retention lines (right CROS serial number [id number], left serial number [id number]). Repair and loss/damage warranties end 2026. One year in-office service plan ends 2024. Not connected to iPhone. New user. [employer]/WES.     Hearing Loss History:  Audiogram completed 2024 revealed profound sensorineural hearing loss in the right ear, and normal hearing sensitivity through 1000 Hz with a mild to moderately-severe sensorineural hearing loss in the higher frequencies in the left ear.     HEARING AIDS  Hearing Aid Initial Listening Check:  Right: functioning with left  Left: slightly weak    Hearing Aid Physical Examination:  Right: partially occluded wax guard, dirty dome  Left: partially occluded wax guard, dirty dome    Hearing Aid Clean & Check:  Hearing aids were sanitized and checked. Left  and dome were replaced. Right dome was replaced. Microphones were brushed and vacuumed.    Hearing Aid Final Listening Check:  Right: unremarkable, functioning with left  Left: unremarkable    Hearing Aid Adjustment & Connectivity & Specific Counseling:  Devices were connected to the software via [facility]. Applied current audiogram. Ran feedback manager.     IMPRESSIONS  Hearing aids were returned to the patient and she expressed satisfaction.    RECOMMENDATIONS  -  Continued hearing aid use.  - Annual audiologic evaluation, sooner if an acute change is noted.  - Annual appointment for routine hearing aid maintenance, sooner if questions/problems arise.  - Follow up with otolaryngology as planned.  - Follow-up with medical care team as planned.    PATIENT EDUCATION  Discussed results, impressions and recommendations with the patient. Questions were addressed and the patient was encouraged to contact our office should concerns arise.    CHARGE CAPTURE  No fee under first year in-office service plan.    Time for this encounter: 3996-7959    Abner Mack, CCC-A  Licensed Audiologist

## 2024-06-11 NOTE — PROGRESS NOTES
AUDIOLOGY  AUDIOMETRIC EVALUATION    Name:  Daniela Epstein  :  1950  Age:  73 y.o.  Date of Evaluation:  2024    Reason for visit: Ms. Epstein is seen in the clinic today for an audiologic evaluation. Patient complains of muffled hearing.    HISTORY  Patient reports worsening hearing, periodic otalgia (sharp pain) in the right ear, and dizziness/balance disturbance for which she uses a cane. Patient denies tinnitus, aural fullness/pressure, and otorrhea. Case history was obtained from the patient. History of right acoustic neuroma.     Prior audiogram dated 2023 revealed a profound sensorineural hearing loss in the right ear, and normal hearing sensitivity through 1000 Hz with a mild to moderate sensorineural hearing loss in the higher frequencies in the left ear. Pure tone average and speech reception threshold were in good agreement bilaterally. Word recognition ability could not be evaluated at the limits of the audiometer in the right ear, and was excellent in the left ear.     SCREENINGS  Steadi Fall Risk  One or more falls in the last year? Yes  How many Times? 2 or more  Was the patient injured in the fall? Yes  Feels unsteady when walking? Yes  Worried about Falling? Yes    Domestic Violence Screening  Are you currently or have you recently been threatened or abused physically, emotionally, or sexually by anyone? No  Do you feel UNSAFE going back to the place you are living? No    Pain Assessment  Pain Assessment: 0-10  Pain Score: 4  Pain Type: Chronic pain  Pain Location: Knee  Pain Orientation: Right    EVALUATION  See scanned audiogram: “Media” > “Audiology Report” > Document ID [id number].      RESULTS  Otoscopic Evaluation  Right Ear: clear ear canal with visualization of the tympanic membrane   Left Ear: clear ear canal with visualization of the tympanic membrane     Immittance Measures  Tympanometry:  Right Ear: Type A, normal tympanic membrane mobility with normal middle ear  pressure  Left Ear: Type A, normal tympanic membrane mobility with normal middle ear pressure    Acoustic Reflexes:  Ipsilateral Right Ear: absent from 500 Hz to 4000 Hz  Ipsilateral Left Ear: present and normal from 500 Hz to 2000 Hz, absent at 4000 Hz   Contralateral Right Ear: did not evaluate  Contralateral Left Ear: did not evaluate    Distortion Product Otoacoustic Emissions (DPOAEs)  Right Ear: did not evaluate   Left Ear: did not evaluate     Audiometry  Test Technique and Reliability:   Standard audiometry via supra-aural headphones. Pulsed tone stimulus. Reliability is good.    Pure tone air and bone conduction audiometry:  Right Ear: profound sensorineural hearing loss   Left Ear: normal hearing sensitivity through 1000 Hz with a mild to moderately-severe sensorineural hearing loss in the higher frequencies     Speech Audiometry:  Speech Awareness Threshold (SAT) Right Ear: no response, masked  Speech Reception Threshold (SRT) Right Ear: no response, masked  Speech Reception Threshold (SRT) Left Ear: 30 dB HL  Word Recognition Score (WRS) Right Ear: could not evaluate   Word Recognition Score (WRS) Left Ear: Excellent, 100% at 70 dB HL     IMPRESSIONS  Audiometric evaluation revealed asymmetric, right greater than left sensorineural hearing loss. Tympanometry indicates normal tympanic membrane mobility with normal middle ear pressure bilaterally. Results are stable in the right ear and show slight progression in the left ear compared with 05/16/2023 evaluation. The presence of acoustic reflexes within normal intensity limits is consistent with normal middle ear and brainstem function, and suggests that auditory sensitivity is not significantly impaired. The absence of acoustic reflexes is consistent with a middle ear disorder, hearing loss in the stimulated ear, and/or interruption of neural innervation of the stapedius muscle.    RECOMMENDATIONS  - Annual audiologic evaluation, sooner if an acute change  is noted.  - Hearing aid check and adjustment.  - Continued hearing aid use.  - Follow-up with audiology annually for routine hearing aid maintenance, sooner if questions/problems arise.  - Follow up with otolaryngology as planned.  - Follow-up with medical care team as planned.    PATIENT EDUCATION  Discussed results, impressions and recommendations with the patient. Questions were addressed and the patient was encouraged to contact our office should concerns arise.    Time for this encounter: 7781-9970    Abner Mack, CCC-A  Licensed Audiologist

## 2024-06-20 PROCEDURE — RXMED WILLOW AMBULATORY MEDICATION CHARGE

## 2024-06-24 ENCOUNTER — PHARMACY VISIT (OUTPATIENT)
Dept: PHARMACY | Facility: CLINIC | Age: 74
End: 2024-06-24
Payer: COMMERCIAL

## 2024-06-26 ENCOUNTER — HOSPITAL ENCOUNTER (OUTPATIENT)
Dept: RADIOLOGY | Facility: CLINIC | Age: 74
Discharge: HOME | End: 2024-06-26
Payer: MEDICARE

## 2024-06-26 ENCOUNTER — LAB (OUTPATIENT)
Dept: LAB | Facility: LAB | Age: 74
End: 2024-06-26
Payer: MEDICARE

## 2024-06-26 DIAGNOSIS — N18.4 CHRONIC KIDNEY DISEASE, STAGE 4 (SEVERE) (MULTI): Primary | ICD-10-CM

## 2024-06-26 DIAGNOSIS — N18.4 CHRONIC KIDNEY DISEASE, STAGE 4 (SEVERE) (MULTI): ICD-10-CM

## 2024-06-26 LAB
25(OH)D3 SERPL-MCNC: 32 NG/ML (ref 31–100)
ALBUMIN SERPL-MCNC: 3.9 G/DL (ref 3.5–5)
ANION GAP SERPL CALC-SCNC: 13 MMOL/L
APPEARANCE UR: CLEAR
BILIRUB UR STRIP.AUTO-MCNC: NEGATIVE MG/DL
BUN SERPL-MCNC: 51 MG/DL (ref 8–25)
CALCIUM SERPL-MCNC: 9.2 MG/DL (ref 8.5–10.4)
CHLORIDE SERPL-SCNC: 99 MMOL/L (ref 97–107)
CO2 SERPL-SCNC: 23 MMOL/L (ref 24–31)
COLOR UR: COLORLESS
CREAT SERPL-MCNC: 2.1 MG/DL (ref 0.4–1.6)
CREAT UR-MCNC: 56.1 MG/DL
EGFRCR SERPLBLD CKD-EPI 2021: 24 ML/MIN/1.73M*2
ERYTHROCYTE [DISTWIDTH] IN BLOOD BY AUTOMATED COUNT: 13 % (ref 11.5–14.5)
GLUCOSE SERPL-MCNC: 222 MG/DL (ref 65–99)
GLUCOSE UR STRIP.AUTO-MCNC: ABNORMAL MG/DL
HCT VFR BLD AUTO: 36 % (ref 36–46)
HGB BLD-MCNC: 11.1 G/DL (ref 12–16)
HOLD SPECIMEN: NORMAL
KETONES UR STRIP.AUTO-MCNC: NEGATIVE MG/DL
LEUKOCYTE ESTERASE UR QL STRIP.AUTO: NEGATIVE
MCH RBC QN AUTO: 27.5 PG (ref 26–34)
MCHC RBC AUTO-ENTMCNC: 30.8 G/DL (ref 32–36)
MCV RBC AUTO: 89 FL (ref 80–100)
MICROALBUMIN UR-MCNC: <12 MG/L (ref 0–23)
MICROALBUMIN/CREAT UR: NORMAL MG/G{CREAT}
NITRITE UR QL STRIP.AUTO: NEGATIVE
NRBC BLD-RTO: 0 /100 WBCS (ref 0–0)
PH UR STRIP.AUTO: 5 [PH]
PHOSPHATE SERPL-MCNC: 4.3 MG/DL (ref 2.5–4.5)
PLATELET # BLD AUTO: 343 X10*3/UL (ref 150–450)
POTASSIUM SERPL-SCNC: 4.9 MMOL/L (ref 3.4–5.1)
PROT UR STRIP.AUTO-MCNC: NEGATIVE MG/DL
PROT UR-ACNC: 16 MG/DL (ref 5–25)
PTH-INTACT SERPL-MCNC: 110.9 PG/ML (ref 18.5–88)
RBC # BLD AUTO: 4.04 X10*6/UL (ref 4–5.2)
RBC # UR STRIP.AUTO: NEGATIVE /UL
SODIUM SERPL-SCNC: 135 MMOL/L (ref 133–145)
SP GR UR STRIP.AUTO: 1.01
UROBILINOGEN UR STRIP.AUTO-MCNC: NORMAL MG/DL
WBC # BLD AUTO: 6.6 X10*3/UL (ref 4.4–11.3)

## 2024-06-26 PROCEDURE — 85027 COMPLETE CBC AUTOMATED: CPT

## 2024-06-26 PROCEDURE — 84156 ASSAY OF PROTEIN URINE: CPT

## 2024-06-26 PROCEDURE — 82306 VITAMIN D 25 HYDROXY: CPT

## 2024-06-26 PROCEDURE — 80069 RENAL FUNCTION PANEL: CPT

## 2024-06-26 PROCEDURE — 76770 US EXAM ABDO BACK WALL COMP: CPT

## 2024-06-26 PROCEDURE — 84155 ASSAY OF PROTEIN SERUM: CPT

## 2024-06-26 PROCEDURE — 36415 COLL VENOUS BLD VENIPUNCTURE: CPT

## 2024-06-26 PROCEDURE — 81003 URINALYSIS AUTO W/O SCOPE: CPT

## 2024-06-26 PROCEDURE — 84165 PROTEIN E-PHORESIS SERUM: CPT

## 2024-06-26 PROCEDURE — 82570 ASSAY OF URINE CREATININE: CPT

## 2024-06-26 PROCEDURE — 86334 IMMUNOFIX E-PHORESIS SERUM: CPT

## 2024-06-26 PROCEDURE — 83970 ASSAY OF PARATHORMONE: CPT

## 2024-06-26 PROCEDURE — 76770 US EXAM ABDO BACK WALL COMP: CPT | Performed by: RADIOLOGY

## 2024-06-26 PROCEDURE — 82043 UR ALBUMIN QUANTITATIVE: CPT

## 2024-06-27 LAB — PROT SERPL-MCNC: 7 G/DL (ref 6.4–8.2)

## 2024-06-27 NOTE — PROGRESS NOTES
"[facility]: [facility]  PROGRESS NOTE      Daniela Epstein is a 73 y.o. female that is presenting today for Follow-up.    Assessment/Plan   Diagnoses and all orders for this visit:  Type 2 diabetes mellitus with stage 3a chronic kidney disease, with long-term current use of insulin (Multi)  Type 2 diabetes mellitus without complication, with long-term current use of insulin (Multi)  -     insulin glargine (Basaglar KwikPen U-100 Insulin) 100 unit/mL (3 mL) pen; Inject 12 Units under the skin once daily at bedtime. Take as directed per insulin instructions.  -     pen needle, diabetic 32 gauge x 5/32\" needle; Use daily with insulin injection.  Primary hypertension  -     lisinopril 10 mg tablet; Take 1 tablet (10 mg) by mouth once daily.  Gastroesophageal reflux disease without esophagitis  -     famotidine (Pepcid) 40 mg tablet; Take 1 tablet (40 mg) by mouth once daily.  Depression, unspecified depression type  -     imipramine (Tofranil) 25 mg tablet; Take 1 tablet (25 mg) by mouth once daily at bedtime.  Other orders  -     Follow Up In Primary Care - Established  -     Follow Up In Primary Care - Established; Future  We reviewed her current situation her current blood sugars are not well-controlled.  We are going to get her started back on her Basaglar 12 units daily in addition to current program and we will see if were able to gently dial down these sugars again in the morning.  I know that she really had not wanted to take insulin but I think we have at least shown that we were much better off with that then when we tried to stop it.    I will see her back in a few more weeks for review of the blood sugars.    A few other adjustments here in her medicines.  In light of her chronic kidney disease we stopped her PPI and replace it with Pepcid.  Also, she has for a refill of the lisinopril she had been cutting the 20 mg in half and taking 10 mg a day we therefore we will change that to " "make it easier for her.  Subjective   Pt here for follow up - also did get a chance to see dr jeronimo about her ckd - he is following as well now.  As to the sugars, at her last visit we tried to simplify her regimen.  She wished to be off of the insulin and we had tried placing her on a sulfonylurea plus the Jardiance.  She is checking her sugars twice a day and she brings them back for our review.  Her fasting sugars over the last week have ranged from 198-326 with a sugars before dinner ranging from 180-350.  She also was able to get her colonoscopy accomplished and she has been started on Amitiza.      Review of Systems   Respiratory: Negative.     Cardiovascular: Negative.       Objective   Vitals:    06/28/24 1133   BP: 140/70   Pulse: 51   Temp: 36.1 °C (97 °F)   SpO2: 99%      Body mass index is 34.9 kg/m².  Physical Exam  Diagnostic Results   Lab Results   Component Value Date    GLUCOSE 222 (H) 06/26/2024    CALCIUM 9.2 06/26/2024     06/26/2024    K 4.9 06/26/2024    CO2 23 (L) 06/26/2024    CL 99 06/26/2024    BUN 51 (H) 06/26/2024    CREATININE 2.10 (H) 06/26/2024     Lab Results   Component Value Date    ALT 15 05/07/2024    AST 16 05/07/2024    GGT 18 09/03/2018    ALKPHOS 174 (H) 05/07/2024    BILITOT 0.3 05/07/2024     Lab Results   Component Value Date    WBC 6.6 06/26/2024    HGB 11.1 (L) 06/26/2024    HCT 36.0 06/26/2024    MCV 89 06/26/2024     06/26/2024     Lab Results   Component Value Date    CHOL 167 01/25/2024    CHOL 148 07/06/2023    CHOL 145 07/11/2022     Lab Results   Component Value Date    HDL 74.0 01/25/2024    HDL 65 07/06/2023    HDL 65 07/11/2022     Lab Results   Component Value Date    LDLCALC 68 01/25/2024    LDLCALC 59 (L) 07/06/2023    LDLCALC 51 (L) 07/11/2022     Lab Results   Component Value Date    TRIG 127 01/25/2024    TRIG 121 07/06/2023    TRIG 145 07/11/2022     No components found for: \"CHOLHDL\"  Lab Results   Component Value Date    HGBA1C 11.2 (H) " 05/07/2024     Other labs not included in the list above were reviewed either before or during this encounter.    History    Past Medical History:   Diagnosis Date    Abdominal pain 06/07/2024    Acoustic neuroma (Multi) 08/30/2023    Acute lower urinary tract infection 08/30/2023    Acute otitis externa 06/07/2024    Acute renal failure (CMS-HCC) 03/27/2023    Acute urinary tract infection 03/27/2023    Affective psychosis, bipolar (Multi) 08/30/2023    Age-related nuclear cataract, left eye 10/16/2015    Age-related nuclear cataract of left eye    Age-related nuclear cataract, right eye 10/16/2015    Age-related nuclear cataract of right eye    Allergic     Anemia     Anxiety     Anxiety disorder 08/30/2023    Arthritis     Bilateral dry eyes 08/30/2023    Bipolar disorder (Multi) 08/30/2023    Breast lump 08/30/2023    Cardiac device in situ 08/30/2023    Cerebrovascular accident (CVA) (Multi) 06/07/2024    Cerebrovascular accident (Multi) 08/30/2023    Closed fracture of phalanx of great toe 02/08/2023    Constipation 02/17/2022    Dehydration 06/07/2024    Depression 08/30/2023    Depression, unspecified 05/20/2014    Depression    Diabetes mellitus (Multi) 08/30/2023    Diabetes mellitus, type 2 (Multi) 08/30/2023    Dry eye syndrome of unspecified lacrimal gland 06/25/2015    Dry eye syndrome    Dysfunction of right eustachian tube 08/30/2023    Dyslipidemia 08/30/2023    Dysphagia 08/30/2023    Essential (primary) hypertension 12/15/2013    Hypertension    Essential (primary) hypertension 08/30/2023    Fibrocystic breast changes 08/30/2023    Fracture of bone adjacent to prosthesis 02/14/2022    Gastroesophageal reflux disease 08/30/2023    Hazy vision 08/30/2023    Hip pain 06/07/2024    History of cardiovascular surgery 02/13/2024    Hx of breast cancer 06/08/2016    Hypokalemia 08/30/2023    Infection due to Escherichia coli 03/27/2023    Knee stiffness 01/16/2023    Mastodynia 08/30/2023    Mental  disorder 08/30/2023    Mixed hyperlipidemia 08/30/2023    Myopia, bilateral 10/16/2015    High myopia, both eyes    Nausea & vomiting 08/30/2023    Nose injury 08/30/2023    Obesity 03/27/2023    Oral mucosal lesion 08/30/2023    Osteoarthritis 08/30/2023    Other disorders affecting eyelid function 06/25/2015    Excessive involuntary blinking    Overactive bladder 08/30/2023    Pain in left hand 02/06/2023    Personal history of diseases of the blood and blood-forming organs and certain disorders involving the immune mechanism     History of anemia    Personal history of other diseases of the digestive system     History of esophageal reflux    Personal history of other diseases of the nervous system and sense organs     History of cataract    Personal history of other diseases of urinary system     History of bladder problems    Personal history of other mental and behavioral disorders     History of mental disorder    Post-traumatic headache 02/13/2024    Pure hypercholesterolemia, unspecified 05/20/2014    High cholesterol    Rhinitis 08/30/2023    S/P knee replacement 08/30/2023    Severe myopia 05/07/2015    Snoring     Snoring    Solitary cyst of breast 08/30/2023    Stage 3b chronic kidney disease (Multi) 08/30/2023    Stenosis of lacrimal punctum 05/16/2019    Swelling, mass, or lump on face 08/30/2023    Syncope 08/30/2023    TMJ (temporomandibular joint syndrome) 08/30/2023    Type 2 diabetes mellitus without complications (Multi) 06/25/2015    Diabetes mellitus    Unspecified injury of left wrist, hand and finger(s), initial encounter 02/06/2023    Unsteady gait 08/30/2023    Vaginal irritation 05/15/2024    Vitamin D deficiency 08/30/2023    Weakness 03/27/2023     Past Surgical History:   Procedure Laterality Date    ANKLE SURGERY  1998    injury    APPENDECTOMY  1972    BLEPHAROPLASTY Bilateral 2010    BREAST BIOPSY  2011    BREAST LUMPECTOMY Right 2011    atypical hyperplasia    CARDIAC SURGERY   03/06/2023    implant cardiac event recorder for recurrent syncope/ Dr. Fowler    CARPAL TUNNEL RELEASE Left 2010    Neuroplasty Decompression Median Nerve At Carpal Tunnel    CARPAL TUNNEL RELEASE Right 2010    CHOLECYSTECTOMY  1979    COLONOSCOPY  2007    COLONOSCOPY  2013    COLONOSCOPY  2014    COLONOSCOPY  11/06/2020    COLONOSCOPY  06/2024    CRANIOTOMY Right 2006    acoustic neuroma    CT ANGIO NECK  03/22/2023    CT NECK ANGIO W AND WO IV CONTRAST [facility]    CT HEAD ANGIO W AND WO IV CONTRAST  03/22/2023    CT HEAD ANGIO W AND WO IV CONTRAST [facility] CT    DILATION AND CURETTAGE OF UTERUS  1971    DILATION AND CURETTAGE OF UTERUS  2013    ESOPHAGOGASTRODUODENOSCOPY  2007    ESOPHAGOGASTRODUODENOSCOPY  2010    ESOPHAGOGASTRODUODENOSCOPY  2020    EYE SURGERY      FRACTURE SURGERY      HEMORRHOID SURGERY  1990    JOINT REPLACEMENT      KNEE SURGERY Right 2008    scar tissue    MOUTH SURGERY Right 2017    right buccal lesion    MR HEAD ANGIO WO IV CONTRAST  01/18/2015    MR HEAD ANGIO WO IV CONTRAST [facility]    MR HEAD ANGIO WO IV CONTRAST  05/08/2016    MR HEAD ANGIO WO IV CONTRAST [facility]    MR HEAD ANGIO WO IV CONTRAST  02/28/2017    MR HEAD ANGIO WO IV CONTRAST [facility]    MR NECK ANGIO WO IV CONTRAST  01/18/2015    MR NECK ANGIO WO IV CONTRAST [facility]    NASAL SINUS SURGERY  1985    ORIF FEMUR FRACTURE Right 2022    OVARIAN CYST REMOVAL  1972    SEPTOPLASTY  05/20/2014    Septoplasty    TONSILLECTOMY      TOTAL KNEE ARTHROPLASTY Right 2007    TOTAL KNEE ARTHROPLASTY Left 2011    TUBAL LIGATION  1983    URETHRAL SLING  2013     Family History   Problem Relation Name Age of Onset    Arthritis Mother Paty     Breast cancer Mother Paty     Diabetes Mother Paty     Hypertension Mother Paty     Heart failure Father Luis     Heart disease Father Luis     Arthritis Other SIBLINGS     Diabetes Other SIBLINGS     Hypertension Other SIBLINGS      Social History      Socioeconomic History    Marital status:      Spouse name: Not on file    Number of children: Not on file    Years of education: Not on file    Highest education level: Not on file   Occupational History    Not on file   Tobacco Use    Smoking status: Never    Smokeless tobacco: Never   Vaping Use    Vaping status: Never Used   Substance and Sexual Activity    Alcohol use: Never    Drug use: Never    Sexual activity: Not on file   Other Topics Concern    Not on file   Social History Narrative    Not on file     Social Determinants of Health     Financial Resource Strain: Not on file   Food Insecurity: Not on file   Transportation Needs: Not on file   Physical Activity: Not on file   Stress: Not on file   Social Connections: Not on file   Intimate Partner Violence: Not on file   Housing Stability: Not on file     Allergies   Allergen Reactions    Other Other     TARTICK DYSKENESIA    Phenothiazines Hives, Shortness of breath, Other and Unknown     Slurred speech    Erythromycin Hives, Unknown and Rash    Haloperidol Unknown     Current Outpatient Medications on File Prior to Visit   Medication Sig Dispense Refill    atorvastatin (Lipitor) 40 mg tablet Take 1 tablet (40 mg) by mouth once daily. 90 tablet 3    blood sugar diagnostic strip Inject under the skin once daily.      cholecalciferol (Vitamin D3) 25 MCG (1000 UT) tablet Take 2 tablets (50 mcg) by mouth once daily.      docusate sodium (Colace) 100 mg capsule Take 1 capsule (100 mg) by mouth once daily as needed.      empagliflozin (Jardiance) 25 mg Take 1 tablet (25 mg) by mouth once daily. 90 tablet 3    ferrous sulfate 325 (65 Fe) MG EC tablet Take 1 tablet by mouth. Every other week      fluticasone (Flonase) 50 mcg/actuation nasal spray Administer 1 spray into each nostril once daily as needed.      glipiZIDE (Glucotrol) 5 mg tablet Take 1 tablet (5 mg) by mouth 2 times a day before meals. 60 tablet 11    hydroCHLOROthiazide (HYDRODiuril) 12.5  "mg tablet Take 1 tablet (12.5 mg) by mouth once daily in the morning.      lamoTRIgine (LaMICtal) 200 mg tablet Take 1 tablet (200 mg) by mouth once daily. ODT?      loperamide (Imodium A-D) 2 mg tablet Take 1 tablet (2 mg) by mouth 4 times a day as needed.      loratadine (Claritin) 10 mg tablet Take 1 tablet (10 mg) by mouth if needed.      lubiprostone (Amitiza) 8 mcg capsule TAKE 1 CAPSULE BY MOUTH WITH FOOD AND WATER TWICE A DAY      polyethylene glycol (Miralax) 17 gram packet Take 17 g by mouth once daily as needed. MIX WITH 8 OZ FLUID      sertraline (Zoloft) 50 mg tablet Take 1 tablet (50 mg) by mouth once daily.      [DISCONTINUED] imipramine (Tofranil) 25 mg tablet Take 1 tablet (25 mg) by mouth once daily at bedtime. 90 tablet 1    [DISCONTINUED] lisinopril 20 mg tablet Take 1 tablet (20 mg) by mouth once daily. (Patient taking differently: Take 0.5 tablets (10 mg) by mouth once daily.) 90 tablet 3    [DISCONTINUED] omeprazole (PriLOSEC) 40 mg DR capsule Take 1 capsule (40 mg) by mouth once daily in the morning. Take before meals.      [DISCONTINUED] tiZANidine (Zanaflex) 4 mg tablet Take 1 tablet (4 mg) by mouth 2 times a day as needed (HEAD OR NECK PAIN).      [DISCONTINUED] pen needle, diabetic 32 gauge x 5/32\" needle Use daily with insulin injection. 100 each 1    [DISCONTINUED] semaglutide 0.25 mg or 0.5 mg (2 mg/3 mL) pen injector Inject 0.25 mg under the skin 1 (one) time per week. 3 mL 3     No current facility-administered medications on file prior to visit.     Immunization History   Administered Date(s) Administered    DT (pediatric) 05/29/2007    Influenza, seasonal, injectable 10/29/2010, 11/25/2011    Pfizer Purple Cap SARS-CoV-2 03/31/2021, 04/24/2021, 11/04/2021    Pneumococcal conjugate vaccine, 13-valent (PREVNAR 13) 05/23/2018    Pneumococcal conjugate vaccine, 20-valent (PREVNAR 20) 07/12/2023    Pneumococcal polysaccharide vaccine, 23-valent, age 2 years and older (PNEUMOVAX 23) " 10/29/2006    Tdap vaccine, age 7 year and older (BOOSTRIX, ADACEL) 05/23/2018     Patient's medical history was reviewed and updated either before or during this encounter.       Pablo Montoya MD

## 2024-06-28 ENCOUNTER — OFFICE VISIT (OUTPATIENT)
Dept: PRIMARY CARE | Facility: CLINIC | Age: 74
End: 2024-06-28
Payer: MEDICARE

## 2024-06-28 VITALS
DIASTOLIC BLOOD PRESSURE: 70 MMHG | HEIGHT: 63 IN | SYSTOLIC BLOOD PRESSURE: 140 MMHG | BODY MASS INDEX: 34.91 KG/M2 | TEMPERATURE: 97 F | WEIGHT: 197 LBS | HEART RATE: 51 BPM | OXYGEN SATURATION: 99 %

## 2024-06-28 DIAGNOSIS — E11.9 TYPE 2 DIABETES MELLITUS WITHOUT COMPLICATION, WITH LONG-TERM CURRENT USE OF INSULIN (MULTI): ICD-10-CM

## 2024-06-28 DIAGNOSIS — I10 PRIMARY HYPERTENSION: ICD-10-CM

## 2024-06-28 DIAGNOSIS — E11.22 TYPE 2 DIABETES MELLITUS WITH STAGE 3A CHRONIC KIDNEY DISEASE, WITH LONG-TERM CURRENT USE OF INSULIN (MULTI): Primary | ICD-10-CM

## 2024-06-28 DIAGNOSIS — N18.31 TYPE 2 DIABETES MELLITUS WITH STAGE 3A CHRONIC KIDNEY DISEASE, WITH LONG-TERM CURRENT USE OF INSULIN (MULTI): Primary | ICD-10-CM

## 2024-06-28 DIAGNOSIS — Z79.4 TYPE 2 DIABETES MELLITUS WITHOUT COMPLICATION, WITH LONG-TERM CURRENT USE OF INSULIN (MULTI): ICD-10-CM

## 2024-06-28 DIAGNOSIS — K21.9 GASTROESOPHAGEAL REFLUX DISEASE WITHOUT ESOPHAGITIS: ICD-10-CM

## 2024-06-28 DIAGNOSIS — F32.A DEPRESSION, UNSPECIFIED DEPRESSION TYPE: ICD-10-CM

## 2024-06-28 DIAGNOSIS — Z79.4 TYPE 2 DIABETES MELLITUS WITH STAGE 3A CHRONIC KIDNEY DISEASE, WITH LONG-TERM CURRENT USE OF INSULIN (MULTI): Primary | ICD-10-CM

## 2024-06-28 PROCEDURE — 3048F LDL-C <100 MG/DL: CPT | Performed by: INTERNAL MEDICINE

## 2024-06-28 PROCEDURE — 1160F RVW MEDS BY RX/DR IN RCRD: CPT | Performed by: INTERNAL MEDICINE

## 2024-06-28 PROCEDURE — 1036F TOBACCO NON-USER: CPT | Performed by: INTERNAL MEDICINE

## 2024-06-28 PROCEDURE — 99213 OFFICE O/P EST LOW 20 MIN: CPT | Performed by: INTERNAL MEDICINE

## 2024-06-28 PROCEDURE — G2211 COMPLEX E/M VISIT ADD ON: HCPCS | Performed by: INTERNAL MEDICINE

## 2024-06-28 PROCEDURE — 3046F HEMOGLOBIN A1C LEVEL >9.0%: CPT | Performed by: INTERNAL MEDICINE

## 2024-06-28 PROCEDURE — 4010F ACE/ARB THERAPY RXD/TAKEN: CPT | Performed by: INTERNAL MEDICINE

## 2024-06-28 PROCEDURE — 3062F POS MACROALBUMINURIA REV: CPT | Performed by: INTERNAL MEDICINE

## 2024-06-28 PROCEDURE — 3077F SYST BP >= 140 MM HG: CPT | Performed by: INTERNAL MEDICINE

## 2024-06-28 PROCEDURE — 1159F MED LIST DOCD IN RCRD: CPT | Performed by: INTERNAL MEDICINE

## 2024-06-28 PROCEDURE — 1126F AMNT PAIN NOTED NONE PRSNT: CPT | Performed by: INTERNAL MEDICINE

## 2024-06-28 PROCEDURE — 3078F DIAST BP <80 MM HG: CPT | Performed by: INTERNAL MEDICINE

## 2024-06-28 RX ORDER — FAMOTIDINE 40 MG/1
40 TABLET, FILM COATED ORAL DAILY
Qty: 90 TABLET | Refills: 3 | Status: SHIPPED | OUTPATIENT
Start: 2024-06-28 | End: 2025-06-28

## 2024-06-28 RX ORDER — PEN NEEDLE, DIABETIC 30 GX3/16"
NEEDLE, DISPOSABLE MISCELLANEOUS
Qty: 100 EACH | Refills: 1 | Status: SHIPPED | OUTPATIENT
Start: 2024-06-28

## 2024-06-28 RX ORDER — LUBIPROSTONE 8 UG/1
CAPSULE ORAL
COMMUNITY
Start: 2024-06-21

## 2024-06-28 RX ORDER — IMIPRAMINE HYDROCHLORIDE 25 MG/1
25 TABLET, FILM COATED ORAL NIGHTLY
Qty: 90 TABLET | Refills: 1 | Status: SHIPPED | OUTPATIENT
Start: 2024-06-28

## 2024-06-28 RX ORDER — INSULIN GLARGINE 100 [IU]/ML
12 INJECTION, SOLUTION SUBCUTANEOUS NIGHTLY
Qty: 3.6 ML | Refills: 11 | Status: SHIPPED | OUTPATIENT
Start: 2024-06-28 | End: 2025-06-28

## 2024-06-28 RX ORDER — LISINOPRIL 10 MG/1
10 TABLET ORAL DAILY
Qty: 90 TABLET | Refills: 3 | Status: SHIPPED | OUTPATIENT
Start: 2024-06-28 | End: 2025-06-28

## 2024-06-28 ASSESSMENT — PATIENT HEALTH QUESTIONNAIRE - PHQ9
2. FEELING DOWN, DEPRESSED OR HOPELESS: NOT AT ALL
SUM OF ALL RESPONSES TO PHQ9 QUESTIONS 1 AND 2: 0
1. LITTLE INTEREST OR PLEASURE IN DOING THINGS: NOT AT ALL

## 2024-06-28 ASSESSMENT — PAIN SCALES - GENERAL: PAINLEVEL: 0-NO PAIN

## 2024-06-28 ASSESSMENT — ENCOUNTER SYMPTOMS
RESPIRATORY NEGATIVE: 1
CARDIOVASCULAR NEGATIVE: 1

## 2024-06-28 NOTE — PATIENT INSTRUCTIONS
I am not pleased with the results of the sugars - you were better when we added a small dose of insulin - please restart the basaglar at 12 untis daily - continue to take the other meds as you are- continue to check sugars twice a day - and see my back in another couple of weeks for review.  We made a couple of other adjustments here first of all in terms of the lisinopril you do not have to cut those anymore instead of taking 20 mg half a tablet daily I changed the 10 mg tablet so you will have to cut those secondly in light of your kidney issues lets stop the omeprazole and replace it with some famotidine 40 mg daily I sent that into your pharmacy as well.

## 2024-06-30 LAB
ALBUMIN: 3.7 G/DL (ref 3.4–5)
ALPHA 1 GLOBULIN: 0.3 G/DL (ref 0.2–0.6)
ALPHA 2 GLOBULIN: 0.9 G/DL (ref 0.4–1.1)
BETA GLOBULIN: 1.3 G/DL (ref 0.5–1.2)
GAMMA GLOBULIN: 0.8 G/DL (ref 0.5–1.4)
IMMUNOFIXATION COMMENT: ABNORMAL
PATH REVIEW - SERUM IMMUNOFIXATION: ABNORMAL
PATH REVIEW-SERUM PROTEIN ELECTROPHORESIS: ABNORMAL
PROTEIN ELECTROPHORESIS COMMENT: ABNORMAL

## 2024-07-09 NOTE — PROGRESS NOTES
[facility]: [facility]  PROGRESS NOTE      Daniela Epstein is a 73 y.o. female that is presenting today for Follow-up.    Assessment/Plan   Diagnoses and all orders for this visit:  Type 2 diabetes mellitus with stage 3b chronic kidney disease, with long-term current use of insulin (Multi)  -     Referral to Endocrinology; Future  Anemia, unspecified type  Primary hypertension  Encounter for routine laboratory testing  -     Hemoglobin A1C; Future  Type 2 diabetes mellitus without complication, with long-term current use of insulin (Multi)  -     Hemoglobin A1C; Future  Skin lesion  -     Referral to Dermatology  Mixed hyperlipidemia  -     Comprehensive Metabolic Panel; Future  -     Lipid Panel; Future  Other orders  -     Follow Up In Primary Care - Established  -     Follow Up In Primary Care - Established; Future  We reviewed the blood sugars today.  Her fasting sugars now are ranging from 1 10-1 98.  Her predinner sugars however are very high at 220 or 230.  For her sugar she currently is taking Jardiance 25 mg daily Basaglar 12 units daily and glipizide 5 mg twice a day.  Using her current medications we may have some latitude to increase the glipizide some we are going to go to 2 glipizide in the morning with breakfast and 1 with dinner to see if this helps push down the afternoon sugars.  We also discussed the fact that she has had experience with Dr. Patrick Rodriguez in the past I like to send her back there for further evaluation.    I will see her back in about a month for follow-up.  I am curious to see how her A1c is coming down as it had been very high a few months ago we will be able to check this again just prior to your next appointment.  Subjective   She is here to follow-up for her blood sugars at her last visit we had added Basaglar 12 units a day back to her program.  Her sugars are being checked twice a day and she brings these numbers for us for review today.  In terms of  her sugars she has been feeling okay with no new symptoms.  She does complain that she has a pain in the right side of her back around where she has a bump on her skin.      Review of Systems   Eyes:  Positive for itching.   Respiratory: Negative.     Cardiovascular: Negative.    Gastrointestinal: Negative.    Genitourinary: Negative.       Objective   Vitals:    07/10/24 0947   BP: 124/84   Pulse: 83   Temp: 36.2 °C (97.2 °F)   SpO2: 99%      Body mass index is 35.25 kg/m².  Physical Exam  Eyes:      Extraocular Movements: Extraocular movements intact.      Conjunctiva/sclera: Conjunctivae normal.   Cardiovascular:      Rate and Rhythm: Normal rate and regular rhythm.   Pulmonary:      Effort: Pulmonary effort is normal.      Breath sounds: Normal breath sounds.   Abdominal:      General: Abdomen is flat. Bowel sounds are normal.      Palpations: Abdomen is soft.   Skin:     Comments: It feels like there may be a 1 to 2 cm cyst in the soft tissues of the right flank region which is what is causing some of her pain.       Diagnostic Results   Lab Results   Component Value Date    GLUCOSE 222 (H) 06/26/2024    CALCIUM 9.2 06/26/2024     06/26/2024    K 4.9 06/26/2024    CO2 23 (L) 06/26/2024    CL 99 06/26/2024    BUN 51 (H) 06/26/2024    CREATININE 2.10 (H) 06/26/2024     Lab Results   Component Value Date    ALT 15 05/07/2024    AST 16 05/07/2024    GGT 18 09/03/2018    ALKPHOS 174 (H) 05/07/2024    BILITOT 0.3 05/07/2024     Lab Results   Component Value Date    WBC 6.6 06/26/2024    HGB 11.1 (L) 06/26/2024    HCT 36.0 06/26/2024    MCV 89 06/26/2024     06/26/2024     Lab Results   Component Value Date    CHOL 167 01/25/2024    CHOL 148 07/06/2023    CHOL 145 07/11/2022     Lab Results   Component Value Date    HDL 74.0 01/25/2024    HDL 65 07/06/2023    HDL 65 07/11/2022     Lab Results   Component Value Date    LDLCALC 68 01/25/2024    LDLCALC 59 (L) 07/06/2023    LDLCALC 51 (L) 07/11/2022     Lab  "Results   Component Value Date    TRIG 127 01/25/2024    TRIG 121 07/06/2023    TRIG 145 07/11/2022     No components found for: \"CHOLHDL\"  Lab Results   Component Value Date    HGBA1C 11.2 (H) 05/07/2024     Other labs not included in the list above were reviewed either before or during this encounter.    History    Past Medical History:   Diagnosis Date    Abdominal pain 06/07/2024    Acoustic neuroma (Multi) 08/30/2023    Acute lower urinary tract infection 08/30/2023    Acute otitis externa 06/07/2024    Acute renal failure (CMS-[facility]) 03/27/2023    Acute urinary tract infection 03/27/2023    Affective psychosis, bipolar (Multi) 08/30/2023    Age-related nuclear cataract, left eye 10/16/2015    Age-related nuclear cataract of left eye    Age-related nuclear cataract, right eye 10/16/2015    Age-related nuclear cataract of right eye    Allergic     Anemia     Anxiety     Anxiety disorder 08/30/2023    Arthritis     Bilateral dry eyes 08/30/2023    Bipolar disorder (Multi) 08/30/2023    Breast lump 08/30/2023    Cardiac device in situ 08/30/2023    Cerebrovascular accident (CVA) (Multi) 06/07/2024    Cerebrovascular accident (Multi) 08/30/2023    Closed fracture of phalanx of great toe 02/08/2023    Constipation 02/17/2022    Dehydration 06/07/2024    Depression 08/30/2023    Depression, unspecified 05/20/2014    Depression    Diabetes mellitus (Multi) 08/30/2023    Diabetes mellitus, type 2 (Multi) 08/30/2023    Dry eye syndrome of unspecified lacrimal gland 06/25/2015    Dry eye syndrome    Dysfunction of right eustachian tube 08/30/2023    Dyslipidemia 08/30/2023    Dysphagia 08/30/2023    Essential (primary) hypertension 12/15/2013    Hypertension    Essential (primary) hypertension 08/30/2023    Fibrocystic breast changes 08/30/2023    Fracture of bone adjacent to prosthesis 02/14/2022    Gastroesophageal reflux disease 08/30/2023    Hazy vision 08/30/2023    Hip pain 06/07/2024    History of cardiovascular " surgery 02/13/2024    Hx of breast cancer 06/08/2016    Hypokalemia 08/30/2023    Infection due to Escherichia coli 03/27/2023    Knee stiffness 01/16/2023    Mastodynia 08/30/2023    Mental disorder 08/30/2023    Mixed hyperlipidemia 08/30/2023    Myopia, bilateral 10/16/2015    High myopia, both eyes    Nausea & vomiting 08/30/2023    Nose injury 08/30/2023    Obesity 03/27/2023    Oral mucosal lesion 08/30/2023    Osteoarthritis 08/30/2023    Other disorders affecting eyelid function 06/25/2015    Excessive involuntary blinking    Overactive bladder 08/30/2023    Pain in left hand 02/06/2023    Personal history of diseases of the blood and blood-forming organs and certain disorders involving the immune mechanism     History of anemia    Personal history of other diseases of the digestive system     History of esophageal reflux    Personal history of other diseases of the nervous system and sense organs     History of cataract    Personal history of other diseases of urinary system     History of bladder problems    Personal history of other mental and behavioral disorders     History of mental disorder    Post-traumatic headache 02/13/2024    Pure hypercholesterolemia, unspecified 05/20/2014    High cholesterol    Rhinitis 08/30/2023    S/P knee replacement 08/30/2023    Severe myopia 05/07/2015    Snoring     Snoring    Solitary cyst of breast 08/30/2023    Stage 3b chronic kidney disease (Multi) 08/30/2023    Stenosis of lacrimal punctum 05/16/2019    Swelling, mass, or lump on face 08/30/2023    Syncope 08/30/2023    TMJ (temporomandibular joint syndrome) 08/30/2023    Type 2 diabetes mellitus without complications (Multi) 06/25/2015    Diabetes mellitus    Unspecified injury of left wrist, hand and finger(s), initial encounter 02/06/2023    Unsteady gait 08/30/2023    Vaginal irritation 05/15/2024    Vitamin D deficiency 08/30/2023    Weakness 03/27/2023     Past Surgical History:   Procedure Laterality Date     ANKLE SURGERY  1998    injury    APPENDECTOMY  1972    BLEPHAROPLASTY Bilateral 2010    BREAST BIOPSY  2011    BREAST LUMPECTOMY Right 2011    atypical hyperplasia    CARDIAC SURGERY  03/06/2023    implant cardiac event recorder for recurrent syncope/ Dr. Fowler    CARPAL TUNNEL RELEASE Left 2010    Neuroplasty Decompression Median Nerve At Carpal Tunnel    CARPAL TUNNEL RELEASE Right 2010    CHOLECYSTECTOMY  1979    COLONOSCOPY  2007    COLONOSCOPY  2013    COLONOSCOPY  2014    COLONOSCOPY  11/06/2020    COLONOSCOPY  06/2024    CRANIOTOMY Right 2006    acoustic neuroma    CT ANGIO NECK  03/22/2023    CT NECK ANGIO W AND WO IV CONTRAST [facility] [place] [facility]    CT HEAD ANGIO W AND WO IV CONTRAST  03/22/2023    CT HEAD ANGIO W AND WO IV CONTRAST [facility] [place] [facility]    DILATION AND CURETTAGE OF UTERUS  1971    DILATION AND CURETTAGE OF UTERUS  2013    ESOPHAGOGASTRODUODENOSCOPY  2007    ESOPHAGOGASTRODUODENOSCOPY  2010    ESOPHAGOGASTRODUODENOSCOPY  2020    EYE SURGERY      FRACTURE SURGERY      HEMORRHOID SURGERY  1990    JOINT REPLACEMENT      KNEE SURGERY Right 2008    scar tissue    MOUTH SURGERY Right 2017    right buccal lesion    MR HEAD ANGIO WO IV CONTRAST  01/18/2015    MR HEAD ANGIO WO IV CONTRAST [facility]    MR HEAD ANGIO WO IV CONTRAST  05/08/2016    MR HEAD ANGIO WO IV CONTRAST [facility]    MR HEAD ANGIO WO IV CONTRAST  02/28/2017    MR HEAD ANGIO WO IV CONTRAST [facility]    MR NECK ANGIO WO IV CONTRAST  01/18/2015    MR NECK ANGIO WO IV CONTRAST [facility]    NASAL SINUS SURGERY  1985    ORIF FEMUR FRACTURE Right 2022    OVARIAN CYST REMOVAL  1972    SEPTOPLASTY  05/20/2014    Septoplasty    TONSILLECTOMY      TOTAL KNEE ARTHROPLASTY Right 2007    TOTAL KNEE ARTHROPLASTY Left 2011    TUBAL LIGATION  1983    URETHRAL SLING  2013     Family History   Problem Relation Name Age of Onset    Arthritis Mother Paty     Breast cancer Mother Paty     Diabetes Mother Paty      Hypertension Mother Paty     Heart failure Father Luis     Heart disease Father Luis     Arthritis Other SIBLINGS     Diabetes Other SIBLINGS     Hypertension Other SIBLINGS      Social History     Socioeconomic History    Marital status:      Spouse name: Not on file    Number of children: Not on file    Years of education: Not on file    Highest education level: Not on file   Occupational History    Not on file   Tobacco Use    Smoking status: Never    Smokeless tobacco: Never   Vaping Use    Vaping status: Never Used   Substance and Sexual Activity    Alcohol use: Never    Drug use: Never    Sexual activity: Not on file   Other Topics Concern    Not on file   Social History Narrative    Not on file     Social Determinants of Health     Financial Resource Strain: Not on file   Food Insecurity: Not on file   Transportation Needs: Not on file   Physical Activity: Not on file   Stress: Not on file   Social Connections: Not on file   Intimate Partner Violence: Not on file   Housing Stability: Not on file     Allergies   Allergen Reactions    Other Other     TARTICK DYSKENESIA    Phenothiazines Hives, Shortness of breath, Other and Unknown     Slurred speech    Erythromycin Hives, Unknown and Rash    Haloperidol Unknown     Current Outpatient Medications on File Prior to Visit   Medication Sig Dispense Refill    atorvastatin (Lipitor) 40 mg tablet Take 1 tablet (40 mg) by mouth once daily. 90 tablet 3    blood sugar diagnostic strip Inject under the skin once daily.      cholecalciferol (Vitamin D3) 25 MCG (1000 UT) tablet Take 2 tablets (50 mcg) by mouth once daily.      docusate sodium (Colace) 100 mg capsule Take 1 capsule (100 mg) by mouth once daily as needed.      empagliflozin (Jardiance) 25 mg Take 1 tablet (25 mg) by mouth once daily. 90 tablet 3    famotidine (Pepcid) 40 mg tablet Take 1 tablet (40 mg) by mouth once daily. 90 tablet 3    ferrous sulfate 325 (65 Fe) MG EC tablet Take 1 tablet by mouth.  "Every other week      fluticasone (Flonase) 50 mcg/actuation nasal spray Administer 1 spray into each nostril once daily as needed.      glipiZIDE (Glucotrol) 5 mg tablet Take 1 tablet (5 mg) by mouth 2 times a day before meals. 60 tablet 11    hydroCHLOROthiazide (HYDRODiuril) 12.5 mg tablet Take 1 tablet (12.5 mg) by mouth once daily in the morning.      imipramine (Tofranil) 25 mg tablet Take 1 tablet (25 mg) by mouth once daily at bedtime. 90 tablet 1    insulin glargine (Basaglar KwikPen U-100 Insulin) 100 unit/mL (3 mL) pen Inject 12 Units under the skin once daily at bedtime. Take as directed per insulin instructions. 3.6 mL 11    lamoTRIgine (LaMICtal) 200 mg tablet Take 1 tablet (200 mg) by mouth once daily. ODT?      lisinopril 10 mg tablet Take 1 tablet (10 mg) by mouth once daily. 90 tablet 3    loperamide (Imodium A-D) 2 mg tablet Take 1 tablet (2 mg) by mouth 4 times a day as needed.      loratadine (Claritin) 10 mg tablet Take 1 tablet (10 mg) by mouth if needed.      lubiprostone (Amitiza) 8 mcg capsule TAKE 1 CAPSULE BY MOUTH WITH FOOD AND WATER TWICE A DAY      pen needle, diabetic 32 gauge x 5/32\" needle Use daily with insulin injection. 100 each 1    polyethylene glycol (Miralax) 17 gram packet Take 17 g by mouth once daily as needed. MIX WITH 8 OZ FLUID      sertraline (Zoloft) 50 mg tablet Take 1 tablet (50 mg) by mouth once daily.       No current facility-administered medications on file prior to visit.     Immunization History   Administered Date(s) Administered    DT (pediatric) 05/29/2007    Influenza, seasonal, injectable 10/29/2010, 11/25/2011    Pfizer Purple Cap SARS-CoV-2 03/31/2021, 04/24/2021, 11/04/2021    Pneumococcal conjugate vaccine, 13-valent (PREVNAR 13) 05/23/2018    Pneumococcal conjugate vaccine, 20-valent (PREVNAR 20) 07/12/2023    Pneumococcal polysaccharide vaccine, 23-valent, age 2 years and older (PNEUMOVAX 23) 10/29/2006    Tdap vaccine, age 7 year and older " (BOOSTRIX, ADACEL) 05/23/2018     Patient's medical history was reviewed and updated either before or during this encounter.       Pablo Montoya MD

## 2024-07-10 ENCOUNTER — OFFICE VISIT (OUTPATIENT)
Dept: PRIMARY CARE | Facility: CLINIC | Age: 74
End: 2024-07-10
Payer: MEDICARE

## 2024-07-10 VITALS
WEIGHT: 199 LBS | TEMPERATURE: 97.2 F | SYSTOLIC BLOOD PRESSURE: 124 MMHG | BODY MASS INDEX: 35.26 KG/M2 | HEART RATE: 83 BPM | HEIGHT: 63 IN | DIASTOLIC BLOOD PRESSURE: 84 MMHG | OXYGEN SATURATION: 99 %

## 2024-07-10 DIAGNOSIS — E78.2 MIXED HYPERLIPIDEMIA: ICD-10-CM

## 2024-07-10 DIAGNOSIS — Z01.89 ENCOUNTER FOR ROUTINE LABORATORY TESTING: ICD-10-CM

## 2024-07-10 DIAGNOSIS — Z79.4 TYPE 2 DIABETES MELLITUS WITH STAGE 3B CHRONIC KIDNEY DISEASE, WITH LONG-TERM CURRENT USE OF INSULIN (MULTI): Primary | ICD-10-CM

## 2024-07-10 DIAGNOSIS — N18.32 TYPE 2 DIABETES MELLITUS WITH STAGE 3B CHRONIC KIDNEY DISEASE, WITH LONG-TERM CURRENT USE OF INSULIN (MULTI): Primary | ICD-10-CM

## 2024-07-10 DIAGNOSIS — E11.22 TYPE 2 DIABETES MELLITUS WITH STAGE 3B CHRONIC KIDNEY DISEASE, WITH LONG-TERM CURRENT USE OF INSULIN (MULTI): Primary | ICD-10-CM

## 2024-07-10 DIAGNOSIS — I10 PRIMARY HYPERTENSION: ICD-10-CM

## 2024-07-10 DIAGNOSIS — D64.9 ANEMIA, UNSPECIFIED TYPE: ICD-10-CM

## 2024-07-10 DIAGNOSIS — Z79.4 TYPE 2 DIABETES MELLITUS WITHOUT COMPLICATION, WITH LONG-TERM CURRENT USE OF INSULIN (MULTI): ICD-10-CM

## 2024-07-10 DIAGNOSIS — L98.9 SKIN LESION: ICD-10-CM

## 2024-07-10 DIAGNOSIS — E11.9 TYPE 2 DIABETES MELLITUS WITHOUT COMPLICATION, WITH LONG-TERM CURRENT USE OF INSULIN (MULTI): ICD-10-CM

## 2024-07-10 PROCEDURE — 1126F AMNT PAIN NOTED NONE PRSNT: CPT | Performed by: INTERNAL MEDICINE

## 2024-07-10 PROCEDURE — 1159F MED LIST DOCD IN RCRD: CPT | Performed by: INTERNAL MEDICINE

## 2024-07-10 PROCEDURE — 99213 OFFICE O/P EST LOW 20 MIN: CPT | Performed by: INTERNAL MEDICINE

## 2024-07-10 PROCEDURE — 1160F RVW MEDS BY RX/DR IN RCRD: CPT | Performed by: INTERNAL MEDICINE

## 2024-07-10 PROCEDURE — G2211 COMPLEX E/M VISIT ADD ON: HCPCS | Performed by: INTERNAL MEDICINE

## 2024-07-10 PROCEDURE — 3074F SYST BP LT 130 MM HG: CPT | Performed by: INTERNAL MEDICINE

## 2024-07-10 PROCEDURE — 3062F POS MACROALBUMINURIA REV: CPT | Performed by: INTERNAL MEDICINE

## 2024-07-10 PROCEDURE — 3048F LDL-C <100 MG/DL: CPT | Performed by: INTERNAL MEDICINE

## 2024-07-10 PROCEDURE — 4010F ACE/ARB THERAPY RXD/TAKEN: CPT | Performed by: INTERNAL MEDICINE

## 2024-07-10 PROCEDURE — 3079F DIAST BP 80-89 MM HG: CPT | Performed by: INTERNAL MEDICINE

## 2024-07-10 PROCEDURE — 3046F HEMOGLOBIN A1C LEVEL >9.0%: CPT | Performed by: INTERNAL MEDICINE

## 2024-07-10 PROCEDURE — 1036F TOBACCO NON-USER: CPT | Performed by: INTERNAL MEDICINE

## 2024-07-10 ASSESSMENT — PAIN SCALES - GENERAL: PAINLEVEL: 0-NO PAIN

## 2024-07-10 ASSESSMENT — ENCOUNTER SYMPTOMS
RESPIRATORY NEGATIVE: 1
LOSS OF SENSATION IN FEET: 0
CARDIOVASCULAR NEGATIVE: 1
GASTROINTESTINAL NEGATIVE: 1
DEPRESSION: 0
EYE ITCHING: 1
OCCASIONAL FEELINGS OF UNSTEADINESS: 1

## 2024-07-10 ASSESSMENT — PATIENT HEALTH QUESTIONNAIRE - PHQ9
1. LITTLE INTEREST OR PLEASURE IN DOING THINGS: NOT AT ALL
2. FEELING DOWN, DEPRESSED OR HOPELESS: NOT AT ALL
SUM OF ALL RESPONSES TO PHQ9 QUESTIONS 1 AND 2: 0

## 2024-07-10 NOTE — PATIENT INSTRUCTIONS
I am glad to see the fasting sugars are looking a lot better I like you I am concerned that the blood sugars before dinner are still too high and often over 200.  Lets make the following change today: On the glipizide go ahead and take 5 mg tablet 2 with breakfast and 1 with dinner daily (instead of the 1 with breakfast and 1 with dinner as that you have currently been doing) I also think it might be a good idea for us to get you back to Dr. Rodriguez to see if he has any good ideas for us.  I will be seeing you next month.  For the cyst on your side I referred you back to the Gina Berardinelli who had seen you in the past.    Please discuss your dry eyes/ eye irritation with your eye doctor.

## 2024-07-15 ENCOUNTER — TELEPHONE (OUTPATIENT)
Dept: PRIMARY CARE | Facility: CLINIC | Age: 74
End: 2024-07-15
Payer: MEDICARE

## 2024-07-15 ENCOUNTER — APPOINTMENT (OUTPATIENT)
Dept: PRIMARY CARE | Facility: CLINIC | Age: 74
End: 2024-07-15
Payer: MEDICARE

## 2024-07-15 DIAGNOSIS — R10.9 RIGHT FLANK PAIN: ICD-10-CM

## 2024-07-15 DIAGNOSIS — R10.9 RIGHT SIDED ABDOMINAL PAIN: ICD-10-CM

## 2024-07-15 NOTE — TELEPHONE ENCOUNTER
Patient states that she saw Gina Berardinelli for the lump on her R side and was advised that it is too deep to be a cyst and that she should have MRI. Please advise.

## 2024-07-16 NOTE — TELEPHONE ENCOUNTER
Spoke with patient. Called office and left message requesting visit note from Juliet's visit sent to the office from visit 7/12. Patient states that Juliet said that pcp should order testing. Patient states that the area with the bump on rt side does cause her discomfort when laying or bending to the right.  Do you want to order MRI or wait for visit note?

## 2024-07-16 NOTE — TELEPHONE ENCOUNTER
"Patient states it is on her right side \"between armpit and hip\". Denies it is at hip or on rib area. Patient states it feels \"deep\" ,  right flank  "

## 2024-07-19 ENCOUNTER — HOSPITAL ENCOUNTER (OUTPATIENT)
Dept: RADIOLOGY | Facility: CLINIC | Age: 74
Discharge: HOME | End: 2024-07-19
Payer: MEDICARE

## 2024-07-19 DIAGNOSIS — R10.9 RIGHT SIDED ABDOMINAL PAIN: ICD-10-CM

## 2024-07-19 DIAGNOSIS — R10.9 RIGHT FLANK PAIN: ICD-10-CM

## 2024-07-19 PROCEDURE — 74176 CT ABD & PELVIS W/O CONTRAST: CPT

## 2024-07-19 PROCEDURE — A9698 NON-RAD CONTRAST MATERIALNOC: HCPCS | Performed by: INTERNAL MEDICINE

## 2024-07-19 PROCEDURE — 2550000001 HC RX 255 CONTRASTS: Performed by: INTERNAL MEDICINE

## 2024-07-23 ENCOUNTER — LAB (OUTPATIENT)
Dept: LAB | Facility: LAB | Age: 74
End: 2024-07-23
Payer: MEDICARE

## 2024-07-23 DIAGNOSIS — E78.2 MIXED HYPERLIPIDEMIA: ICD-10-CM

## 2024-07-23 DIAGNOSIS — Z79.4 TYPE 2 DIABETES MELLITUS WITHOUT COMPLICATION, WITH LONG-TERM CURRENT USE OF INSULIN (MULTI): ICD-10-CM

## 2024-07-23 DIAGNOSIS — E11.9 TYPE 2 DIABETES MELLITUS WITHOUT COMPLICATION, WITH LONG-TERM CURRENT USE OF INSULIN (MULTI): ICD-10-CM

## 2024-07-23 DIAGNOSIS — Z01.89 ENCOUNTER FOR ROUTINE LABORATORY TESTING: ICD-10-CM

## 2024-07-23 LAB
ALBUMIN SERPL-MCNC: 4.2 G/DL (ref 3.5–5)
ALP BLD-CCNC: 170 U/L (ref 35–125)
ALT SERPL-CCNC: 14 U/L (ref 5–40)
ANION GAP SERPL CALC-SCNC: 14 MMOL/L
AST SERPL-CCNC: 13 U/L (ref 5–40)
BILIRUB SERPL-MCNC: 0.3 MG/DL (ref 0.1–1.2)
BUN SERPL-MCNC: 41 MG/DL (ref 8–25)
CALCIUM SERPL-MCNC: 9.2 MG/DL (ref 8.5–10.4)
CHLORIDE SERPL-SCNC: 99 MMOL/L (ref 97–107)
CHOLEST SERPL-MCNC: 187 MG/DL (ref 133–200)
CHOLEST/HDLC SERPL: 2.6 {RATIO}
CO2 SERPL-SCNC: 26 MMOL/L (ref 24–31)
CREAT SERPL-MCNC: 2.2 MG/DL (ref 0.4–1.6)
EGFRCR SERPLBLD CKD-EPI 2021: 23 ML/MIN/1.73M*2
EST. AVERAGE GLUCOSE BLD GHB EST-MCNC: 200 MG/DL
GLUCOSE SERPL-MCNC: 132 MG/DL (ref 65–99)
HBA1C MFR BLD: 8.6 %
HDLC SERPL-MCNC: 73 MG/DL
LDLC SERPL CALC-MCNC: 88 MG/DL (ref 65–130)
POTASSIUM SERPL-SCNC: 4.5 MMOL/L (ref 3.4–5.1)
PROT SERPL-MCNC: 7.3 G/DL (ref 5.9–7.9)
SODIUM SERPL-SCNC: 139 MMOL/L (ref 133–145)
TRIGL SERPL-MCNC: 130 MG/DL (ref 40–150)

## 2024-07-23 PROCEDURE — 83036 HEMOGLOBIN GLYCOSYLATED A1C: CPT

## 2024-07-23 PROCEDURE — 80053 COMPREHEN METABOLIC PANEL: CPT

## 2024-07-23 PROCEDURE — 80061 LIPID PANEL: CPT

## 2024-07-23 PROCEDURE — 36415 COLL VENOUS BLD VENIPUNCTURE: CPT

## 2024-07-30 NOTE — PROGRESS NOTES
[facility]: [facility]  PROGRESS NOTE      Daniela Epstein is a 73 y.o. female that is presenting today for Diabetes (1 mo fu).    Assessment/Plan   Diagnoses and all orders for this visit:  Type 2 diabetes mellitus without complication, with long-term current use of insulin (Multi)  Diabetic peripheral neuropathy (Multi)  Dyslipidemia  -     Comprehensive Metabolic Panel; Future  Gastroesophageal reflux disease without esophagitis  Hyperglycemia  -     Hemoglobin A1C; Future  Tinea corporis  -     nystatin (Mycostatin) cream; Apply topically 2 times a day for 7 days. apply to affected area  Other orders  -     Follow Up In Primary Care - Established  -     Follow Up In Primary Care - Established; Future  We reviewed her current blood work and her current situation in terms of her diabetes her most recent blood sugars this week so fasting sugars ranging from 10 2-1 38 and her prior to dinner sugars now are in the 100-1 40 range.  This is a marked improvement from previously.  Also her hemoglobin A1c has come down from over 11 to the mid eights.  We in general are pretty happy with that improvement in her going to continue with our current plan in place.    In terms of her blood pressure and cholesterol she is doing well as well with no changes needed there.    I will plan on keeping her medications the same and I will be seeing her back in 3 months    She does have a small yeast infection in the area of her umbilicus for which I will give her some nystatin cream.  Subjective   She sees me for follow-up care today.  In someway she has been feeling much better.  She is happy to note that she has been much more active and has rejoined her swimming group.  She does continue to monitor the blood sugars.  As we discussed at her last visit we had increased her morning dose of her sulfonylurea to 2 tablets.  She also recently had a follow-up appointment with Dr. Aguilar jeronimo and will be needing to see  him again for 4 months.  She also sees Dr. Sanders carries practice every 3 to 4 months.      Review of Systems   Respiratory: Negative.     Cardiovascular: Negative.    Gastrointestinal: Negative.    Endocrine: Negative.    Genitourinary: Negative.       Objective   Vitals:    07/31/24 1100   BP: 112/62   Pulse:    Temp:    SpO2:       Body mass index is 35.07 kg/m².  Physical Exam  Cardiovascular:      Rate and Rhythm: Normal rate and regular rhythm.      Pulses: Normal pulses.   Pulmonary:      Effort: Pulmonary effort is normal.      Breath sounds: Normal breath sounds.   Abdominal:      General: Abdomen is flat. Bowel sounds are normal.      Palpations: Abdomen is soft.   Musculoskeletal:      Cervical back: Normal range of motion and neck supple.   Skin:     General: Skin is warm.   Neurological:      General: No focal deficit present.      Mental Status: She is oriented to person, place, and time.   Psychiatric:         Mood and Affect: Mood normal.       Diagnostic Results   Lab Results   Component Value Date    GLUCOSE 132 (H) 07/23/2024    CALCIUM 9.2 07/23/2024     07/23/2024    K 4.5 07/23/2024    CO2 26 07/23/2024    CL 99 07/23/2024    BUN 41 (H) 07/23/2024    CREATININE 2.20 (H) 07/23/2024     Lab Results   Component Value Date    ALT 14 07/23/2024    AST 13 07/23/2024    GGT 18 09/03/2018    ALKPHOS 170 (H) 07/23/2024    BILITOT 0.3 07/23/2024     Lab Results   Component Value Date    WBC 6.6 06/26/2024    HGB 11.1 (L) 06/26/2024    HCT 36.0 06/26/2024    MCV 89 06/26/2024     06/26/2024     Lab Results   Component Value Date    CHOL 187 07/23/2024    CHOL 167 01/25/2024    CHOL 148 07/06/2023     Lab Results   Component Value Date    HDL 73.0 07/23/2024    HDL 74.0 01/25/2024    HDL 65 07/06/2023     Lab Results   Component Value Date    LDLCALC 88 07/23/2024    LDLCALC 68 01/25/2024    LDLCALC 59 (L) 07/06/2023     Lab Results   Component Value Date    TRIG 130 07/23/2024    TRIG 127  "01/25/2024    TRIG 121 07/06/2023     No components found for: \"CHOLHDL\"  Lab Results   Component Value Date    HGBA1C 8.6 (H) 07/23/2024     Other labs not included in the list above were reviewed either before or during this encounter.    History    Past Medical History:   Diagnosis Date    Abdominal pain 06/07/2024    Acoustic neuroma (Multi) 08/30/2023    Acute lower urinary tract infection 08/30/2023    Acute otitis externa 06/07/2024    Acute renal failure (CMS-HCC) 03/27/2023    Acute urinary tract infection 03/27/2023    Affective psychosis, bipolar (Multi) 08/30/2023    Age-related nuclear cataract, left eye 10/16/2015    Age-related nuclear cataract of left eye    Age-related nuclear cataract, right eye 10/16/2015    Age-related nuclear cataract of right eye    Allergic     Anemia     Anxiety     Anxiety disorder 08/30/2023    Arthritis     Bilateral dry eyes 08/30/2023    Bipolar disorder (Multi) 08/30/2023    Breast lump 08/30/2023    Cardiac device in situ 08/30/2023    Cerebrovascular accident (CVA) (Multi) 06/07/2024    Cerebrovascular accident (Multi) 08/30/2023    Closed fracture of phalanx of great toe 02/08/2023    Constipation 02/17/2022    Dehydration 06/07/2024    Depression 08/30/2023    Depression, unspecified 05/20/2014    Depression    Diabetes mellitus (Multi) 08/30/2023    Diabetes mellitus, type 2 (Multi) 08/30/2023    Dry eye syndrome of unspecified lacrimal gland 06/25/2015    Dry eye syndrome    Dysfunction of right eustachian tube 08/30/2023    Dyslipidemia 08/30/2023    Dysphagia 08/30/2023    Essential (primary) hypertension 12/15/2013    Hypertension    Essential (primary) hypertension 08/30/2023    Fibrocystic breast changes 08/30/2023    Fracture of bone adjacent to prosthesis 02/14/2022    Gastroesophageal reflux disease 08/30/2023    Hazy vision 08/30/2023    Hip pain 06/07/2024    History of cardiovascular surgery 02/13/2024    Hx of breast cancer 06/08/2016    Hypokalemia " 08/30/2023    Infection due to Escherichia coli 03/27/2023    Knee stiffness 01/16/2023    Mastodynia 08/30/2023    Mental disorder 08/30/2023    Mixed hyperlipidemia 08/30/2023    Myopia, bilateral 10/16/2015    High myopia, both eyes    Nausea & vomiting 08/30/2023    Nose injury 08/30/2023    Obesity 03/27/2023    Oral mucosal lesion 08/30/2023    Osteoarthritis 08/30/2023    Other disorders affecting eyelid function 06/25/2015    Excessive involuntary blinking    Overactive bladder 08/30/2023    Pain in left hand 02/06/2023    Personal history of diseases of the blood and blood-forming organs and certain disorders involving the immune mechanism     History of anemia    Personal history of other diseases of the digestive system     History of esophageal reflux    Personal history of other diseases of the nervous system and sense organs     History of cataract    Personal history of other diseases of urinary system     History of bladder problems    Personal history of other mental and behavioral disorders     History of mental disorder    Post-traumatic headache 02/13/2024    Pure hypercholesterolemia, unspecified 05/20/2014    High cholesterol    Rhinitis 08/30/2023    S/P knee replacement 08/30/2023    Severe myopia 05/07/2015    Snoring     Snoring    Solitary cyst of breast 08/30/2023    Stage 3b chronic kidney disease (Multi) 08/30/2023    Stenosis of lacrimal punctum 05/16/2019    Swelling, mass, or lump on face 08/30/2023    Syncope 08/30/2023    TMJ (temporomandibular joint syndrome) 08/30/2023    Type 2 diabetes mellitus without complications (Multi) 06/25/2015    Diabetes mellitus    Unspecified injury of left wrist, hand and finger(s), initial encounter 02/06/2023    Unsteady gait 08/30/2023    Vaginal irritation 05/15/2024    Vitamin D deficiency 08/30/2023    Weakness 03/27/2023     Past Surgical History:   Procedure Laterality Date    ANKLE SURGERY  1998    injury    APPENDECTOMY  1972     BLEPHAROPLASTY Bilateral 2010    BREAST BIOPSY  2011    BREAST LUMPECTOMY Right 2011    atypical hyperplasia    CARDIAC SURGERY  03/06/2023    implant cardiac event recorder for recurrent syncope/ Dr. Fowler    CARPAL TUNNEL RELEASE Left 2010    Neuroplasty Decompression Median Nerve At Carpal Tunnel    CARPAL TUNNEL RELEASE Right 2010    CHOLECYSTECTOMY  1979    COLONOSCOPY  2007    COLONOSCOPY  2013    COLONOSCOPY  2014    COLONOSCOPY  11/06/2020    COLONOSCOPY  06/2024    CRANIOTOMY Right 2006    acoustic neuroma    CT ANGIO NECK  03/22/2023    CT NECK [place] W AND [place] [facility] [place] [facility]    [place] HEAD ANGIO W AND WO IV CONTRAST  03/22/2023    CT HEAD ANGIO W AND WO [place] CONTR[place] [facility] [place]    DILATION AND CURETTAGE OF UTERUS  1971    DILATION AND CURETTAGE OF UTERUS  2013    ESOPHAGOGASTRODUODENOSCOPY  2007    ESOPHAGOGASTRODUODENOSCOPY  2010    ESOPHAGOGASTRODUODENOSCOPY  2020    EYE SURGERY      FRACTURE SURGERY      HEMORRHOID SURGERY  1990    JOINT REPLACEMENT      KNEE SURGERY Right 2008    scar tissue    MOUTH SURGERY Right 2017    right buccal lesion    MR HEAD ANGIO WO IV CONTRAST  01/18/2015    MR HEAD ANGIO WO IV CONTRAST [facility]    MR HEAD ANGIO WO IV CONTRAST  05/08/2016    MR HEAD ANGIO WO IV CONTRAST [facility]    MR HEAD ANGIO WO IV CONTRAST  02/28/2017    MR HEAD ANGIO WO IV CONTRAST [facility]    MR NECK ANGIO WO IV CONTRAST  01/18/2015    MR NECK ANGIO WO IV CONTRAST [facility]    NASAL SINUS SURGERY  1985    ORIF FEMUR FRACTURE Right 2022    OVARIAN CYST REMOVAL  1972    SEPTOPLASTY  05/20/2014    Septoplasty    TONSILLECTOMY      TOTAL KNEE ARTHROPLASTY Right 2007    TOTAL KNEE ARTHROPLASTY Left 2011    TUBAL LIGATION  1983    URETHRAL SLING  2013     Family History   Problem Relation Name Age of Onset    Arthritis Mother Paty     Breast cancer Mother Paty     Diabetes Mother Paty     Hypertension Mother Paty     Heart failure Father Luis     Heart  disease Father Luis     Arthritis Other SIBLINGS     Diabetes Other SIBLINGS     Hypertension Other SIBLINGS      Social History     Socioeconomic History    Marital status:      Spouse name: Not on file    Number of children: Not on file    Years of education: Not on file    Highest education level: Not on file   Occupational History    Not on file   Tobacco Use    Smoking status: Never     Passive exposure: Never    Smokeless tobacco: Never   Vaping Use    Vaping status: Never Used   Substance and Sexual Activity    Alcohol use: Yes    Drug use: Never    Sexual activity: Not on file   Other Topics Concern    Not on file   Social History Narrative    Not on file     Social Determinants of Health     Financial Resource Strain: Not on file   Food Insecurity: Not on file   Transportation Needs: Not on file   Physical Activity: Not on file   Stress: Not on file   Social Connections: Not on file   Intimate Partner Violence: Not on file   Housing Stability: Not on file     Allergies   Allergen Reactions    Other Other     TARTICK DYSKENESIA    Phenothiazines Hives, Shortness of breath, Other and Unknown     Slurred speech    Erythromycin Hives, Unknown and Rash    Haloperidol Unknown     Current Outpatient Medications on File Prior to Visit   Medication Sig Dispense Refill    atorvastatin (Lipitor) 40 mg tablet Take 1 tablet (40 mg) by mouth once daily. 90 tablet 3    blood sugar diagnostic strip Inject under the skin once daily.      cholecalciferol (Vitamin D3) 25 MCG (1000 UT) tablet Take 2 tablets (50 mcg) by mouth once daily.      docusate sodium (Colace) 100 mg capsule Take 1 capsule (100 mg) by mouth once daily as needed.      empagliflozin (Jardiance) 25 mg Take 1 tablet (25 mg) by mouth once daily. 90 tablet 3    famotidine (Pepcid) 40 mg tablet Take 1 tablet (40 mg) by mouth once daily. 90 tablet 3    ferrous sulfate 325 (65 Fe) MG EC tablet Take 1 tablet by mouth. Every other week      fluocinonide 0.05  "% cream Apply topically to affected areas on the legs twice a day as needed flares      fluticasone (Flonase) 50 mcg/actuation nasal spray Administer 1 spray into each nostril once daily as needed.      glipiZIDE (Glucotrol) 5 mg tablet Take 1 tablet (5 mg) by mouth 2 times a day before meals. (Patient taking differently: Take 1 tablet (5 mg) by mouth 2 times a day before meals. 2 tabs in the am, 1 tablet in the pm) 60 tablet 11    hydroCHLOROthiazide (HYDRODiuril) 12.5 mg tablet Take 1 tablet (12.5 mg) by mouth once daily in the morning.      imipramine (Tofranil) 25 mg tablet Take 1 tablet (25 mg) by mouth once daily at bedtime. 90 tablet 1    insulin glargine (Basaglar KwikPen U-100 Insulin) 100 unit/mL (3 mL) pen Inject 12 Units under the skin once daily at bedtime. Take as directed per insulin instructions. 3.6 mL 11    lactulose 10 gram/15 mL solution       lamoTRIgine (LaMICtal) 200 mg tablet Take 1 tablet (200 mg) by mouth once daily. ODT?      lisinopril 10 mg tablet Take 1 tablet (10 mg) by mouth once daily. 90 tablet 3    loperamide (Imodium A-D) 2 mg tablet Take 1 tablet (2 mg) by mouth 4 times a day as needed.      loratadine (Claritin) 10 mg tablet Take 1 tablet (10 mg) by mouth if needed.      lubiprostone (Amitiza) 8 mcg capsule once daily with breakfast.      pen needle, diabetic 32 gauge x 5/32\" needle Use daily with insulin injection. 100 each 1    polyethylene glycol (Miralax) 17 gram packet Take 17 g by mouth once daily as needed. MIX WITH 8 OZ FLUID      sertraline (Zoloft) 50 mg tablet Take 1 tablet (50 mg) by mouth once daily.       No current facility-administered medications on file prior to visit.     Immunization History   Administered Date(s) Administered    DT (pediatric) 05/29/2007    Influenza, seasonal, injectable 10/29/2010, 11/25/2011    Pfizer Purple Cap SARS-CoV-2 03/31/2021, 04/24/2021, 11/04/2021    Pneumococcal conjugate vaccine, 13-valent (PREVNAR 13) 05/23/2018    " Pneumococcal conjugate vaccine, 20-valent (PREVNAR 20) 07/12/2023    Pneumococcal polysaccharide vaccine, 23-valent, age 2 years and older (PNEUMOVAX 23) 10/29/2006    Tdap vaccine, age 7 year and older (BOOSTRIX, ADACEL) 05/23/2018     Patient's medical history was reviewed and updated either before or during this encounter.       Pablo Montoya MD

## 2024-07-31 ENCOUNTER — OFFICE VISIT (OUTPATIENT)
Dept: PRIMARY CARE | Facility: CLINIC | Age: 74
End: 2024-07-31
Payer: MEDICARE

## 2024-07-31 VITALS
DIASTOLIC BLOOD PRESSURE: 62 MMHG | TEMPERATURE: 97.8 F | BODY MASS INDEX: 35.08 KG/M2 | HEIGHT: 63 IN | SYSTOLIC BLOOD PRESSURE: 112 MMHG | HEART RATE: 87 BPM | WEIGHT: 198 LBS | OXYGEN SATURATION: 99 %

## 2024-07-31 DIAGNOSIS — E11.9 TYPE 2 DIABETES MELLITUS WITHOUT COMPLICATION, WITH LONG-TERM CURRENT USE OF INSULIN (MULTI): ICD-10-CM

## 2024-07-31 DIAGNOSIS — R73.9 HYPERGLYCEMIA: ICD-10-CM

## 2024-07-31 DIAGNOSIS — E11.9 TYPE 2 DIABETES MELLITUS WITHOUT COMPLICATION, WITH LONG-TERM CURRENT USE OF INSULIN (MULTI): Primary | ICD-10-CM

## 2024-07-31 DIAGNOSIS — K21.9 GASTROESOPHAGEAL REFLUX DISEASE WITHOUT ESOPHAGITIS: ICD-10-CM

## 2024-07-31 DIAGNOSIS — E78.5 DYSLIPIDEMIA: ICD-10-CM

## 2024-07-31 DIAGNOSIS — B35.4 TINEA CORPORIS: ICD-10-CM

## 2024-07-31 DIAGNOSIS — Z79.4 TYPE 2 DIABETES MELLITUS WITHOUT COMPLICATION, WITH LONG-TERM CURRENT USE OF INSULIN (MULTI): ICD-10-CM

## 2024-07-31 DIAGNOSIS — E11.42 DIABETIC PERIPHERAL NEUROPATHY (MULTI): ICD-10-CM

## 2024-07-31 DIAGNOSIS — Z79.4 TYPE 2 DIABETES MELLITUS WITHOUT COMPLICATION, WITH LONG-TERM CURRENT USE OF INSULIN (MULTI): Primary | ICD-10-CM

## 2024-07-31 PROCEDURE — G2211 COMPLEX E/M VISIT ADD ON: HCPCS | Performed by: INTERNAL MEDICINE

## 2024-07-31 PROCEDURE — 1126F AMNT PAIN NOTED NONE PRSNT: CPT | Performed by: INTERNAL MEDICINE

## 2024-07-31 PROCEDURE — 1036F TOBACCO NON-USER: CPT | Performed by: INTERNAL MEDICINE

## 2024-07-31 PROCEDURE — 3008F BODY MASS INDEX DOCD: CPT | Performed by: INTERNAL MEDICINE

## 2024-07-31 PROCEDURE — 1159F MED LIST DOCD IN RCRD: CPT | Performed by: INTERNAL MEDICINE

## 2024-07-31 PROCEDURE — 3074F SYST BP LT 130 MM HG: CPT | Performed by: INTERNAL MEDICINE

## 2024-07-31 PROCEDURE — 99214 OFFICE O/P EST MOD 30 MIN: CPT | Performed by: INTERNAL MEDICINE

## 2024-07-31 PROCEDURE — 3052F HG A1C>EQUAL 8.0%<EQUAL 9.0%: CPT | Performed by: INTERNAL MEDICINE

## 2024-07-31 PROCEDURE — 3048F LDL-C <100 MG/DL: CPT | Performed by: INTERNAL MEDICINE

## 2024-07-31 PROCEDURE — 3062F POS MACROALBUMINURIA REV: CPT | Performed by: INTERNAL MEDICINE

## 2024-07-31 PROCEDURE — 3078F DIAST BP <80 MM HG: CPT | Performed by: INTERNAL MEDICINE

## 2024-07-31 PROCEDURE — 4010F ACE/ARB THERAPY RXD/TAKEN: CPT | Performed by: INTERNAL MEDICINE

## 2024-07-31 PROCEDURE — RXMED WILLOW AMBULATORY MEDICATION CHARGE

## 2024-07-31 PROCEDURE — 1160F RVW MEDS BY RX/DR IN RCRD: CPT | Performed by: INTERNAL MEDICINE

## 2024-07-31 RX ORDER — NYSTATIN 100000 U/G
CREAM TOPICAL 2 TIMES DAILY
Qty: 30 G | Refills: 0 | Status: SHIPPED | OUTPATIENT
Start: 2024-07-31 | End: 2024-08-07

## 2024-07-31 RX ORDER — FLUOCINONIDE 0.5 MG/G
CREAM TOPICAL
COMMUNITY
Start: 2024-07-15

## 2024-07-31 RX ORDER — INSULIN GLARGINE 100 [IU]/ML
12 INJECTION, SOLUTION SUBCUTANEOUS NIGHTLY
Qty: 15 ML | Refills: 3 | Status: SHIPPED | OUTPATIENT
Start: 2024-07-31

## 2024-07-31 RX ORDER — LACTULOSE 10 G/15ML
SOLUTION ORAL; RECTAL
COMMUNITY
Start: 2024-07-30

## 2024-07-31 ASSESSMENT — PATIENT HEALTH QUESTIONNAIRE - PHQ9
10. IF YOU CHECKED OFF ANY PROBLEMS, HOW DIFFICULT HAVE THESE PROBLEMS MADE IT FOR YOU TO DO YOUR WORK, TAKE CARE OF THINGS AT HOME, OR GET ALONG WITH OTHER PEOPLE: SOMEWHAT DIFFICULT
2. FEELING DOWN, DEPRESSED OR HOPELESS: SEVERAL DAYS
1. LITTLE INTEREST OR PLEASURE IN DOING THINGS: NOT AT ALL
SUM OF ALL RESPONSES TO PHQ9 QUESTIONS 1 AND 2: 1

## 2024-07-31 ASSESSMENT — ENCOUNTER SYMPTOMS
RESPIRATORY NEGATIVE: 1
GASTROINTESTINAL NEGATIVE: 1
ENDOCRINE NEGATIVE: 1
CARDIOVASCULAR NEGATIVE: 1

## 2024-07-31 ASSESSMENT — PAIN SCALES - GENERAL: PAINLEVEL: 0-NO PAIN

## 2024-07-31 NOTE — PATIENT INSTRUCTIONS
I am glad to see that you are doing so much better.  Lets leave your medications as they are right now and I will plan on seeing you back in 3 months.    I did give you a cream that you can put twice a day on that rash near your bellybutton to kill fungus/yeast.

## 2024-08-02 ENCOUNTER — PHARMACY VISIT (OUTPATIENT)
Dept: PHARMACY | Facility: CLINIC | Age: 74
End: 2024-08-02
Payer: COMMERCIAL

## 2024-08-16 DIAGNOSIS — I10 PRIMARY HYPERTENSION: ICD-10-CM

## 2024-08-16 DIAGNOSIS — Z79.4 TYPE 2 DIABETES MELLITUS WITHOUT COMPLICATION, WITH LONG-TERM CURRENT USE OF INSULIN (MULTI): ICD-10-CM

## 2024-08-16 DIAGNOSIS — E11.9 TYPE 2 DIABETES MELLITUS WITHOUT COMPLICATION, WITH LONG-TERM CURRENT USE OF INSULIN (MULTI): ICD-10-CM

## 2024-08-16 RX ORDER — LISINOPRIL 5 MG/1
10 TABLET ORAL DAILY
Qty: 90 TABLET | Refills: 3 | Status: SHIPPED | OUTPATIENT
Start: 2024-08-16

## 2024-08-16 RX ORDER — GLIPIZIDE 5 MG/1
5 TABLET ORAL 2 TIMES DAILY
Qty: 270 TABLET | Refills: 3 | Status: SHIPPED | OUTPATIENT
Start: 2024-08-16

## 2024-08-16 NOTE — TELEPHONE ENCOUNTER
Refills requested to [facility]:  -glipizide 5 mg...per pt, Rx sent incorrectly and should be for 2 tabs in am and 1 tab in pm.    -lisinopril- pt states that this was also incorrect and should be for 5 mg 1 tab daily

## 2024-08-19 DIAGNOSIS — I10 PRIMARY HYPERTENSION: ICD-10-CM

## 2024-08-19 DIAGNOSIS — Z79.4 TYPE 2 DIABETES MELLITUS WITHOUT COMPLICATION, WITH LONG-TERM CURRENT USE OF INSULIN (MULTI): ICD-10-CM

## 2024-08-19 DIAGNOSIS — E11.9 TYPE 2 DIABETES MELLITUS WITHOUT COMPLICATION, WITH LONG-TERM CURRENT USE OF INSULIN (MULTI): ICD-10-CM

## 2024-08-19 RX ORDER — LISINOPRIL 5 MG/1
5 TABLET ORAL DAILY
Qty: 90 TABLET | Refills: 3 | OUTPATIENT
Start: 2024-08-19

## 2024-08-19 RX ORDER — GLIPIZIDE 5 MG/1
5 TABLET ORAL 2 TIMES DAILY
Qty: 270 TABLET | Refills: 3 | OUTPATIENT
Start: 2024-08-19

## 2024-08-21 RX ORDER — LISINOPRIL 5 MG/1
5 TABLET ORAL DAILY
Qty: 90 TABLET | Refills: 3 | Status: SHIPPED | OUTPATIENT
Start: 2024-08-21

## 2024-08-21 NOTE — TELEPHONE ENCOUNTER
The one sent over on the 16th was for two 5mg tabs which would still equal 10mg. They still dispensed the one 10mg tab so she cannot just take one if she still picked it up. States they are too small to split. Please see the pended order.

## 2024-08-30 ENCOUNTER — APPOINTMENT (OUTPATIENT)
Dept: PRIMARY CARE | Facility: CLINIC | Age: 74
End: 2024-08-30
Payer: MEDICARE

## 2024-09-11 ENCOUNTER — LAB REQUISITION (OUTPATIENT)
Dept: LAB | Facility: HOSPITAL | Age: 74
End: 2024-09-11
Payer: MEDICARE

## 2024-09-11 DIAGNOSIS — N90.89 OTHER SPECIFIED NONINFLAMMATORY DISORDERS OF VULVA AND PERINEUM: ICD-10-CM

## 2024-09-11 PROCEDURE — 87205 SMEAR GRAM STAIN: CPT

## 2024-09-14 LAB
CLUE CELLS VAG LPF-#/AREA: NORMAL /[LPF]
NUGENT SCORE: 1
YEAST VAG WET PREP-#/AREA: NORMAL

## 2024-09-18 PROCEDURE — RXMED WILLOW AMBULATORY MEDICATION CHARGE

## 2024-09-20 ENCOUNTER — PHARMACY VISIT (OUTPATIENT)
Dept: PHARMACY | Facility: CLINIC | Age: 74
End: 2024-09-20
Payer: COMMERCIAL

## 2024-09-26 ENCOUNTER — APPOINTMENT (OUTPATIENT)
Dept: ENDOCRINOLOGY | Facility: CLINIC | Age: 74
End: 2024-09-26
Payer: MEDICARE

## 2024-09-26 VITALS
BODY MASS INDEX: 36.79 KG/M2 | WEIGHT: 207.6 LBS | SYSTOLIC BLOOD PRESSURE: 119 MMHG | HEIGHT: 63 IN | HEART RATE: 88 BPM | DIASTOLIC BLOOD PRESSURE: 79 MMHG

## 2024-09-26 DIAGNOSIS — I10 PRIMARY HYPERTENSION: ICD-10-CM

## 2024-09-26 DIAGNOSIS — E11.22 TYPE 2 DIABETES MELLITUS WITH STAGE 3B CHRONIC KIDNEY DISEASE, WITH LONG-TERM CURRENT USE OF INSULIN (MULTI): Primary | ICD-10-CM

## 2024-09-26 DIAGNOSIS — Z79.4 TYPE 2 DIABETES MELLITUS WITH STAGE 3B CHRONIC KIDNEY DISEASE, WITH LONG-TERM CURRENT USE OF INSULIN (MULTI): Primary | ICD-10-CM

## 2024-09-26 DIAGNOSIS — N18.32 TYPE 2 DIABETES MELLITUS WITH STAGE 3B CHRONIC KIDNEY DISEASE, WITH LONG-TERM CURRENT USE OF INSULIN (MULTI): Primary | ICD-10-CM

## 2024-09-26 DIAGNOSIS — E78.2 MIXED HYPERLIPIDEMIA: ICD-10-CM

## 2024-09-26 LAB — POC HEMOGLOBIN A1C: 8.4 % (ref 4.2–6.5)

## 2024-09-26 PROCEDURE — 3078F DIAST BP <80 MM HG: CPT | Performed by: NURSE PRACTITIONER

## 2024-09-26 PROCEDURE — 83036 HEMOGLOBIN GLYCOSYLATED A1C: CPT | Performed by: NURSE PRACTITIONER

## 2024-09-26 PROCEDURE — 4010F ACE/ARB THERAPY RXD/TAKEN: CPT | Performed by: NURSE PRACTITIONER

## 2024-09-26 PROCEDURE — 3008F BODY MASS INDEX DOCD: CPT | Performed by: NURSE PRACTITIONER

## 2024-09-26 PROCEDURE — 99204 OFFICE O/P NEW MOD 45 MIN: CPT | Performed by: NURSE PRACTITIONER

## 2024-09-26 PROCEDURE — 3052F HG A1C>EQUAL 8.0%<EQUAL 9.0%: CPT | Performed by: NURSE PRACTITIONER

## 2024-09-26 PROCEDURE — 3062F POS MACROALBUMINURIA REV: CPT | Performed by: NURSE PRACTITIONER

## 2024-09-26 PROCEDURE — 1036F TOBACCO NON-USER: CPT | Performed by: NURSE PRACTITIONER

## 2024-09-26 PROCEDURE — 3074F SYST BP LT 130 MM HG: CPT | Performed by: NURSE PRACTITIONER

## 2024-09-26 PROCEDURE — 1159F MED LIST DOCD IN RCRD: CPT | Performed by: NURSE PRACTITIONER

## 2024-09-26 PROCEDURE — 1126F AMNT PAIN NOTED NONE PRSNT: CPT | Performed by: NURSE PRACTITIONER

## 2024-09-26 PROCEDURE — 3048F LDL-C <100 MG/DL: CPT | Performed by: NURSE PRACTITIONER

## 2024-09-26 ASSESSMENT — LIFESTYLE VARIABLES
HOW MANY STANDARD DRINKS CONTAINING ALCOHOL DO YOU HAVE ON A TYPICAL DAY: PATIENT DOES NOT DRINK
SKIP TO QUESTIONS 9-10: 1
HOW OFTEN DO YOU HAVE SIX OR MORE DRINKS ON ONE OCCASION: NEVER
HOW OFTEN DO YOU HAVE A DRINK CONTAINING ALCOHOL: NEVER
AUDIT-C TOTAL SCORE: 0

## 2024-09-26 ASSESSMENT — PATIENT HEALTH QUESTIONNAIRE - PHQ9
2. FEELING DOWN, DEPRESSED OR HOPELESS: NOT AT ALL
SUM OF ALL RESPONSES TO PHQ9 QUESTIONS 1 & 2: 0
1. LITTLE INTEREST OR PLEASURE IN DOING THINGS: NOT AT ALL

## 2024-09-26 ASSESSMENT — ENCOUNTER SYMPTOMS
OCCASIONAL FEELINGS OF UNSTEADINESS: 0
LOSS OF SENSATION IN FEET: 0
DEPRESSION: 0

## 2024-09-26 ASSESSMENT — PAIN SCALES - GENERAL: PAINLEVEL: 0-NO PAIN

## 2024-09-26 NOTE — PROGRESS NOTES
HPI   Presents as new patient/metabolic management 72 yo with DM Type 2 +neuropathy diagnosed in her 50's. History CKD (Creatinine 2.2 GFR 23 Azem), HTN, HLD, Bipolar,Depression, TIA. Current A1C 8.4%. Patient testing sugars 2 times day. No lows. Recalls blood sugars waking 110-130, before dinner 210-300. Following carb controlled diet somewhat and know reasonable carb allowances. Patient able to afford their medications. Patient is exercising, attend aquatic therapy twice a week.     -CGM discusssed and not willing to use at this time.   -INSULIN Basaglar 12 units   -Metformin stopped/CKD, GLP1 history chronic constipation, SGLT2 Jardiance BARBA Glipizide 10 mg AM/5 mg dinner changed to just dinner dose adding Tradjenta (UHPAP)   -HTN Lisinopril 5 mg   -STATIN Atorvastatin 40 mg LDL calc 88       Current Outpatient Medications:     atorvastatin (Lipitor) 40 mg tablet, Take 1 tablet (40 mg) by mouth once daily., Disp: 90 tablet, Rfl: 3    blood sugar diagnostic strip, Inject under the skin once daily., Disp: , Rfl:     cholecalciferol (Vitamin D3) 25 MCG (1000 UT) tablet, Take 2 tablets (50 mcg) by mouth once daily., Disp: , Rfl:     empagliflozin (Jardiance) 25 mg, Take 1 tablet (25 mg) by mouth once daily., Disp: 90 tablet, Rfl: 3    ferrous sulfate 325 (65 Fe) MG EC tablet, Take 1 tablet by mouth. Every other week, Disp: , Rfl:     fluocinonide 0.05 % cream, Apply topically to affected areas on the legs twice a day as needed flares, Disp: , Rfl:     fluticasone (Flonase) 50 mcg/actuation nasal spray, Administer 1 spray into each nostril once daily as needed., Disp: , Rfl:     glipiZIDE (Glucotrol) 5 mg tablet, Take 1 tablet (5 mg) by mouth 2 times a day. 2 tabs in the am, 1 tablet in the pm, Disp: 270 tablet, Rfl: 3    hydroCHLOROthiazide (HYDRODiuril) 12.5 mg tablet, Take 1 tablet (12.5 mg) by mouth once daily in the morning., Disp: , Rfl:     imipramine (Tofranil) 25 mg tablet, Take 1 tablet (25 mg) by mouth once  "daily at bedtime., Disp: 90 tablet, Rfl: 1    insulin glargine (Basaglar KwikPen U-100 Insulin) 100 unit/mL (3 mL) pen, Inject 12 Units under the skin once daily at bedtime. Take as directed per insulin instructions., Disp: 15 mL, Rfl: 3    lactulose 10 gram/15 mL solution, , Disp: , Rfl:     lamoTRIgine (LaMICtal) 200 mg tablet, Take 1 tablet (200 mg) by mouth once daily. ODT?, Disp: , Rfl:     lisinopril 5 mg tablet, Take 1 tablet (5 mg) by mouth once daily., Disp: 90 tablet, Rfl: 3    loperamide (Imodium A-D) 2 mg tablet, Take 1 tablet (2 mg) by mouth 4 times a day as needed., Disp: , Rfl:     loratadine (Claritin) 10 mg tablet, Take 1 tablet (10 mg) by mouth if needed., Disp: , Rfl:     lubiprostone (Amitiza) 8 mcg capsule, once daily with breakfast., Disp: , Rfl:     pen needle, diabetic 32 gauge x 5/32\" needle, Use daily with insulin injection., Disp: 100 each, Rfl: 1    polyethylene glycol (Miralax) 17 gram packet, Take 17 g by mouth once daily as needed. MIX WITH 8 OZ FLUID, Disp: , Rfl:     sertraline (Zoloft) 50 mg tablet, Take 1 tablet (50 mg) by mouth once daily., Disp: , Rfl:     docusate sodium (Colace) 100 mg capsule, Take 1 capsule (100 mg) by mouth once daily as needed., Disp: , Rfl:     famotidine (Pepcid) 40 mg tablet, Take 1 tablet (40 mg) by mouth once daily. (Patient not taking: Reported on 9/26/2024), Disp: 90 tablet, Rfl: 3      Allergies as of 09/26/2024 - Reviewed 09/26/2024   Allergen Reaction Noted    Other Other 08/30/2023    Phenothiazines Hives, Shortness of breath, Other, and Unknown 08/30/2023    Erythromycin Hives, Unknown, and Rash 08/30/2023    Haloperidol Unknown 08/30/2023     Past Medical History:   Diagnosis Date    Abdominal pain 06/07/2024    Acoustic neuroma (Multi) 08/30/2023    Acute lower urinary tract infection 08/30/2023    Acute otitis externa 06/07/2024    Acute renal failure (CMS-HCC) 03/27/2023    Acute urinary tract infection 03/27/2023    Affective psychosis, " bipolar (Multi) 08/30/2023    Age-related nuclear cataract, left eye 10/16/2015    Age-related nuclear cataract of left eye    Age-related nuclear cataract, right eye 10/16/2015    Age-related nuclear cataract of right eye    Allergic     Anemia     Anxiety     Anxiety disorder 08/30/2023    Arthritis     Bilateral dry eyes 08/30/2023    Bipolar disorder (Multi) 08/30/2023    Breast lump 08/30/2023    Cardiac device in situ 08/30/2023    Cerebrovascular accident (CVA) (Multi) 06/07/2024    Cerebrovascular accident (Multi) 08/30/2023    Closed fracture of phalanx of great toe 02/08/2023    Constipation 02/17/2022    Dehydration 06/07/2024    Depression 08/30/2023    Depression, unspecified 05/20/2014    Depression    Diabetes mellitus (Multi) 08/30/2023    Diabetes mellitus, type 2 (Multi) 08/30/2023    Dry eye syndrome of unspecified lacrimal gland 06/25/2015    Dry eye syndrome    Dysfunction of right eustachian tube 08/30/2023    Dyslipidemia 08/30/2023    Dysphagia 08/30/2023    Essential (primary) hypertension 12/15/2013    Hypertension    Essential (primary) hypertension 08/30/2023    Fibrocystic breast changes 08/30/2023    Fracture of bone adjacent to prosthesis 02/14/2022    Gastroesophageal reflux disease 08/30/2023    Hazy vision 08/30/2023    Hip pain 06/07/2024    History of cardiovascular surgery 02/13/2024    Hx of breast cancer 06/08/2016    Hypokalemia 08/30/2023    Infection due to Escherichia coli 03/27/2023    Knee stiffness 01/16/2023    Mastodynia 08/30/2023    Mental disorder 08/30/2023    Mixed hyperlipidemia 08/30/2023    Myopia, bilateral 10/16/2015    High myopia, both eyes    Nausea & vomiting 08/30/2023    Nose injury 08/30/2023    Obesity 03/27/2023    Oral mucosal lesion 08/30/2023    Osteoarthritis 08/30/2023    Other disorders affecting eyelid function 06/25/2015    Excessive involuntary blinking    Overactive bladder 08/30/2023    Pain in left hand 02/06/2023    Personal history of  diseases of the blood and blood-forming organs and certain disorders involving the immune mechanism     History of anemia    Personal history of other diseases of the digestive system     History of esophageal reflux    Personal history of other diseases of the nervous system and sense organs     History of cataract    Personal history of other diseases of urinary system     History of bladder problems    Personal history of other mental and behavioral disorders     History of mental disorder    Post-traumatic headache 02/13/2024    Pure hypercholesterolemia, unspecified 05/20/2014    High cholesterol    Rhinitis 08/30/2023    S/P knee replacement 08/30/2023    Severe myopia 05/07/2015    Snoring     Snoring    Solitary cyst of breast 08/30/2023    Stage 3b chronic kidney disease (Multi) 08/30/2023    Stenosis of lacrimal punctum 05/16/2019    Swelling, mass, or lump on face 08/30/2023    Syncope 08/30/2023    TMJ (temporomandibular joint syndrome) 08/30/2023    Type 2 diabetes mellitus without complications (Multi) 06/25/2015    Diabetes mellitus    Unspecified injury of left wrist, hand and finger(s), initial encounter 02/06/2023    Unsteady gait 08/30/2023    Vaginal irritation 05/15/2024    Vitamin D deficiency 08/30/2023    Weakness 03/27/2023      Past Surgical History:   Procedure Laterality Date    ANKLE SURGERY  1998    injury    APPENDECTOMY  1972    BLEPHAROPLASTY Bilateral 2010    BREAST BIOPSY  2011    BREAST LUMPECTOMY Right 2011    atypical hyperplasia    CARDIAC SURGERY  03/06/2023    implant cardiac event recorder for recurrent syncope/ Dr. Fowler    CARPAL TUNNEL RELEASE Left 2010    Neuroplasty Decompression Median Nerve At Carpal Tunnel    CARPAL TUNNEL RELEASE Right 2010    CHOLECYSTECTOMY  1979    COLONOSCOPY  2007    COLONOSCOPY  2013    COLONOSCOPY  2014    COLONOSCOPY  11/06/2020    COLONOSCOPY  06/2024    CRANIOTOMY Right 2006    acoustic neuroma    CT ANGIO NECK  03/22/2023    CT NECK  "ANGIO W AND WO IV CONTRAST [facility] [place] [facility]    CT HEAD ANGIO W AND WO IV CONTRAST  03/22/2023    CT HEAD ANGIO W AND WO IV CONTRAST [facility] [place] CT    DILATION AND CURETTAGE OF UTERUS  1971    DILATION AND CURETTAGE OF UTERUS  2013    ESOPHAGOGASTRODUODENOSCOPY  2007    ESOPHAGOGASTRODUODENOSCOPY  2010    ESOPHAGOGASTRODUODENOSCOPY  2020    EYE SURGERY      FRACTURE SURGERY      HEMORRHOID SURGERY  1990    JOINT REPLACEMENT      KNEE SURGERY Right 2008    scar tissue    MOUTH SURGERY Right 2017    right buccal lesion    MR HEAD ANGIO WO IV CONTRAST  01/18/2015    MR HEAD ANGIO WO IV CONTRAST [facility]    MR HEAD ANGIO WO IV CONTRAST  05/08/2016    MR HEAD ANGIO WO IV CONTRAST [facility]    MR HEAD ANGIO WO IV CONTRAST  02/28/2017    MR HEAD ANGIO WO IV CONTRAST [facility]    MR NECK ANGIO WO IV CONTRAST  01/18/2015    MR NECK ANGIO WO IV CONTRAST [facility]    NASAL SINUS SURGERY  1985    ORIF FEMUR FRACTURE Right 2022    OVARIAN CYST REMOVAL  1972    SEPTOPLASTY  05/20/2014    Septoplasty    TONSILLECTOMY      TOTAL KNEE ARTHROPLASTY Right 2007    TOTAL KNEE ARTHROPLASTY Left 2011    TUBAL LIGATION  1983    URETHRAL SLING  2013          Review of Systems   Cardiology: Lightheadedness-denies.  Chest pain-denies.  Leg edema-denies.  Palpitations-denies.  Respiratory: Cough-denies. Shortness of breath-denies.  Wheezing-denies.  Gastroenterology: Constipation-chronic  Diarrhea-denies.  Heartburn-chronic.  Endocrinology: Cold intolerance-denies.  Heat intolerance-denies.  Sweats-denies.  Neurology: Headache-denies.  Tremor-denies.  Neuropathy in extremities-denies.  Psychology: Low energy-denies.  Irritability-denies.  Sleep disturbances-denies.      /79   Pulse 88   Ht 1.6 m (5' 3\")   Wt 94.2 kg (207 lb 9.6 oz)   BMI 36.77 kg/m²       Labs:  Lab Results   Component Value Date    WBC 6.6 06/26/2024    NRBC 0.0 06/26/2024    RBC 4.04 06/26/2024    HGB 11.1 (L) 06/26/2024    HCT 36.0 " 06/26/2024     06/26/2024     Lab Results   Component Value Date    CALCIUM 9.2 07/23/2024    AST 13 07/23/2024    ALKPHOS 170 (H) 07/23/2024    BILITOT 0.3 07/23/2024    PROT 7.3 07/23/2024    ALBUMIN 4.2 07/23/2024    GLOB 2.9 07/06/2023    AGR 1.4 (L) 07/06/2023     07/23/2024    K 4.5 07/23/2024    CL 99 07/23/2024    CO2 26 07/23/2024    ANIONGAP 14 07/23/2024    BUN 41 (H) 07/23/2024    CREATININE 2.20 (H) 07/23/2024    UREACREAUR 15.6 07/06/2023    GLUCOSE 132 (H) 07/23/2024    ALT 14 07/23/2024    EGFR 23 (L) 07/23/2024     Lab Results   Component Value Date    CHOL 187 07/23/2024    TRIG 130 07/23/2024    HDL 73.0 07/23/2024    LDLCALC 88 07/23/2024     Lab Results   Component Value Date    MICROALBCREA  06/26/2024      Comment:      One or more analytes used in this calculation is outside of the analytical measurement range. Calculation cannot be performed.     Lab Results   Component Value Date    TSH 2.53 07/06/2023     Lab Results   Component Value Date    [id number] 2,000 (H) 07/06/2023     Lab Results   Component Value Date    HGBA1C 8.4 (A) 09/26/2024         Physical Exam   General Appearance: pleasant, cooperative, no acute distress  HEENT: no chemosis, no proptosis, no lid lag, no lid retraction  Neck: no lymphadenopathy, no thyromegaly, no dominant thyroid nodules  Heart: no murmurs, regular rate and rhythm, S1 and S2  Lungs: no wheezes, no rhonci, no rales  Extremities: lower extremity swelling      Assessment/Plan   1. Type 2 diabetes mellitus with stage 3b chronic kidney disease, with long-term current use of insulin (Multi)  -historically A1c in the 8% range, down from 11%  -A1c above target today   -basal insulin past 3-4 months  -declines CGM presently, will explore at FU with educator  -adding Tradjenta to treatment plan (UHPAP) message to PharmD  -decrease and eventually stop BARBA  -most likely will need prandial insulin  -reports target waking BS Lantus can be titrated for  waking -130 if above 130   -GLP1 not best option, her constipation is chronic taking lactulose and Amitiza    - POCT glycosylated hemoglobin (Hb A1C) manually resulted  - Referral to Endocrinology    Patient has been using a blood glucose monitor and performing frequent testing, insulin treated with multiple daily injections and the treatment regime requires frequent adjustment based on blood sugars.   The CGM device is/will be used to lower the risk of hypoglycemia (including severe, very low and nocturnal)  The CGM device is/will be used to adjust insulin dosing based on glucose data, food intake, activity and glucose trends  Patient is adherent to diabetic treatment plan and participates in ongoing education and follow up.  Patient is motivated and knowledgeable about use of continuous glucose monitor.     2. Primary hypertension  -stable    3. Mixed hyperlipidemia  -LDL target < 70         Follow Up:      -labs/tests/notes reviewed  -reviewed and counseled patient on medication monitoring and side effects  Medical Decision Making  Complexity of problem: Chronic illness of diabetes mellitus uncontrolled, progressing  Data analyzed and reviewed: Reviewed prior notes, blood glucose data, labs including HgbA1c, lipids, serum chemistries.  Ordered tests.   Risk of complications and morbidities: Is definite because of use of insulin and risk of hypoglycemia.  Prescription medications reviewed and modifications made.  Compliance assessed.  Addressed social determinants of health including food insecurity.

## 2024-10-01 ENCOUNTER — TELEPHONE (OUTPATIENT)
Dept: CARDIOLOGY | Facility: CLINIC | Age: 74
End: 2024-10-01
Payer: MEDICARE

## 2024-10-02 ENCOUNTER — TELEPHONE (OUTPATIENT)
Dept: PRIMARY CARE | Facility: CLINIC | Age: 74
End: 2024-10-02
Payer: MEDICARE

## 2024-10-02 NOTE — TELEPHONE ENCOUNTER
Called to let her know to stop her med and pt states what if she feels dizzy or if her BP is high. I advised the pt to give us a call so we can put her on the NP schedule to get her looked at since Dr. Montoya is fully booked this month

## 2024-10-02 NOTE — TELEPHONE ENCOUNTER
Pt went for GI appt today and BP was 100/66.  Pt states feeling a little lightheaded and wobbly.  GI office advised her to contact PCP for further instructions.  Please advise.  Ph: [phone]

## 2024-10-03 ENCOUNTER — TELEMEDICINE (OUTPATIENT)
Dept: PHARMACY | Facility: HOSPITAL | Age: 74
End: 2024-10-03
Payer: MEDICARE

## 2024-10-03 ENCOUNTER — TELEPHONE (OUTPATIENT)
Dept: ENDOCRINOLOGY | Facility: CLINIC | Age: 74
End: 2024-10-03

## 2024-10-03 DIAGNOSIS — E11.65 TYPE 2 DIABETES MELLITUS WITH HYPERGLYCEMIA, UNSPECIFIED WHETHER LONG TERM INSULIN USE (MULTI): Primary | ICD-10-CM

## 2024-10-03 DIAGNOSIS — E11.65 TYPE 2 DIABETES MELLITUS WITH HYPERGLYCEMIA, UNSPECIFIED WHETHER LONG TERM INSULIN USE (MULTI): ICD-10-CM

## 2024-10-03 PROCEDURE — RXMED WILLOW AMBULATORY MEDICATION CHARGE

## 2024-10-03 RX ORDER — LINAGLIPTIN 5 MG/1
5 TABLET, FILM COATED ORAL DAILY
Qty: 90 TABLET | Refills: 3 | Status: SHIPPED | OUTPATIENT
Start: 2024-10-03

## 2024-10-03 NOTE — PROGRESS NOTES
[facility]      Daniela collins 73 y.o. patient was referred to the Clinical Pharmacy Team for diabetes management.  Presents today via telephone for initial assessment and management.      Referring Provider: Patrick Diane MD       72 yo with DM Type 2 +neuropathy diagnosed in her 50's. History CKD (Creatinine 2.2 GFR 23 Azem), HTN, HLD, Bipolar,Depression, TIA. Current A1c 8.4%.     Patient testing sugars 2 times day. No lows. Recalls blood sugars waking 110-130, before dinner 210-300. Following carb controlled diet somewhat and know reasonable carb allowances. Patient able to afford their medications. Patient is exercising, attend aquatic therapy twice a week.     -CGM discusssed and not willing to use at this time.   -INSULIN Basaglar 12 units   -Metformin stopped/CKD, GLP1 history chronic constipation, SGLT2 Jardiance BARBA Glipizide 10 mg AM/5 mg dinner changed to just dinner dose adding Tradjenta (UHPAP)   -HTN Lisinopril 5 mg   -STATIN Atorvastatin 40 mg LDL calc 88         Current Outpatient Medications:     atorvastatin (Lipitor) 40 mg tablet, Take 1 tablet (40 mg) by mouth once daily., Disp: 90 tablet, Rfl: 3    blood sugar diagnostic strip, Inject under the skin once daily., Disp: , Rfl:     cholecalciferol (Vitamin D3) 25 MCG (1000 UT) tablet, Take 2 tablets (50 mcg) by mouth once daily., Disp: , Rfl:     docusate sodium (Colace) 100 mg capsule, Take 1 capsule (100 mg) by mouth once daily as needed., Disp: , Rfl:     empagliflozin (Jardiance) 25 mg, Take 1 tablet (25 mg) by mouth once daily., Disp: 90 tablet, Rfl: 3    famotidine (Pepcid) 40 mg tablet, Take 1 tablet (40 mg) by mouth once daily. (Patient not taking: Reported on 9/26/2024), Disp: 90 tablet, Rfl: 3    ferrous sulfate 325 (65 Fe) MG EC tablet, Take 1 tablet by mouth. Every other week, Disp: , Rfl:     fluocinonide 0.05 % cream, Apply topically to affected areas on the legs twice a day as needed flares, Disp: , Rfl:     " fluticasone (Flonase) 50 mcg/actuation nasal spray, Administer 1 spray into each nostril once daily as needed., Disp: , Rfl:     glipiZIDE (Glucotrol) 5 mg tablet, Take 1 tablet (5 mg) by mouth 2 times a day. 2 tabs in the am, 1 tablet in the pm, Disp: 270 tablet, Rfl: 3    hydroCHLOROthiazide (HYDRODiuril) 12.5 mg tablet, Take 1 tablet (12.5 mg) by mouth once daily in the morning., Disp: , Rfl:     imipramine (Tofranil) 25 mg tablet, Take 1 tablet (25 mg) by mouth once daily at bedtime., Disp: 90 tablet, Rfl: 1    insulin glargine (Basaglar KwikPen U-100 Insulin) 100 unit/mL (3 mL) pen, Inject 12 Units under the skin once daily at bedtime. Take as directed per insulin instructions., Disp: 15 mL, Rfl: 3    lactulose 10 gram/15 mL solution, , Disp: , Rfl:     lamoTRIgine (LaMICtal) 200 mg tablet, Take 1 tablet (200 mg) by mouth once daily. ODT?, Disp: , Rfl:     lisinopril 5 mg tablet, Take 1 tablet (5 mg) by mouth once daily., Disp: 90 tablet, Rfl: 3    loperamide (Imodium A-D) 2 mg tablet, Take 1 tablet (2 mg) by mouth 4 times a day as needed., Disp: , Rfl:     loratadine (Claritin) 10 mg tablet, Take 1 tablet (10 mg) by mouth if needed., Disp: , Rfl:     lubiprostone (Amitiza) 8 mcg capsule, once daily with breakfast., Disp: , Rfl:     pen needle, diabetic 32 gauge x 5/32\" needle, Use daily with insulin injection., Disp: 100 each, Rfl: 1    polyethylene glycol (Miralax) 17 gram packet, Take 17 g by mouth once daily as needed. MIX WITH 8 OZ FLUID, Disp: , Rfl:     sertraline (Zoloft) 50 mg tablet, Take 1 tablet (50 mg) by mouth once daily., Disp: , Rfl:      Allergies as of 10/03/2024 - Reviewed 09/26/2024   Allergen Reaction Noted    Other Other 08/30/2023    Phenothiazines Hives, Shortness of breath, Other, and Unknown 08/30/2023    Erythromycin Hives, Unknown, and Rash 08/30/2023    Haloperidol Unknown 08/30/2023     Lab Results   Component Value Date    HGBA1C 8.4 (A) 09/26/2024     Lab Results   Component " Value Date    BILITOT 0.3 07/23/2024    CALCIUM 9.2 07/23/2024    CO2 26 07/23/2024    CL 99 07/23/2024    CREATININE 2.20 (H) 07/23/2024    GLUCOSE 132 (H) 07/23/2024    ALKPHOS 170 (H) 07/23/2024    K 4.5 07/23/2024    PROT 7.3 07/23/2024     07/23/2024    AST 13 07/23/2024    ALT 14 07/23/2024    BUN 41 (H) 07/23/2024    ANIONGAP 14 07/23/2024    MG 1.82 03/24/2023    PHOS 4.3 06/26/2024    GGT 18 09/03/2018    ALBUMIN 4.2 07/23/2024    EGFR 23 (L) 07/23/2024    GFRF 41 (A) 03/24/2023     Lab Results   Component Value Date    CHOL 187 07/23/2024    TRIG 130 07/23/2024    HDL 73.0 07/23/2024    LDLCALC 88 07/23/2024     Lab Results   Component Value Date    MICROALBCREA  06/26/2024      Comment:      One or more analytes used in this calculation is outside of the analytical measurement range. Calculation cannot be performed.        Patient Assistance (VAF)  Patient verbally reports monthly or yearly income which is less than 400% federal poverty level.  Request for program has been submitted to add the following medications: Tradjenta  Patient aware this process may take up to 6 weeks.   If approved medication must be filled through [facility] pharmacy and may be picked up or mailed to patient.    PATIENT EDUCATION/DISCUSSION:  - Counseled patient on MOA, expectations, side effects, duration of therapy, contraindications, administration, and monitoring parameters  - Answered all patient questions and concerns    Assessment/Plan   1. Type 2 diabetes mellitus with hyperglycemia, unspecified whether long term insulin use (Multi)    -pt already approved through  pap, request submitted to add tradjenta 5mg daily       Continue all meds under the continuation of care with the referring provider and clinical pharmacy team.  Prescription(s) sent to [facility] pharmacy for assistance on authorization and copay. Medication will be mailed to patient.    Follow up as scheduled with Dr. Diane & team in endocrinology  office.  Follow up annually with clinical pharmacist for re-enrollment in [facility]    Thank you,   Karyn Ramos, PharmD, [facility]     Verbal consent to manage patient's drug therapy was obtained from the patient and/or an individual authorized to act on behalf of a patient. They were informed they may decline to participate or withdraw from participation in pharmacy services at any time.

## 2024-10-05 ENCOUNTER — PHARMACY VISIT (OUTPATIENT)
Dept: PHARMACY | Facility: CLINIC | Age: 74
End: 2024-10-05
Payer: COMMERCIAL

## 2024-10-14 ENCOUNTER — LAB (OUTPATIENT)
Dept: LAB | Facility: LAB | Age: 74
End: 2024-10-14
Payer: MEDICARE

## 2024-10-14 DIAGNOSIS — E78.5 DYSLIPIDEMIA: ICD-10-CM

## 2024-10-14 DIAGNOSIS — R73.9 HYPERGLYCEMIA: ICD-10-CM

## 2024-10-14 LAB
ALBUMIN SERPL BCP-MCNC: 3.7 G/DL (ref 3.4–5)
ALP SERPL-CCNC: 143 U/L (ref 33–136)
ALT SERPL W P-5'-P-CCNC: 15 U/L (ref 7–45)
ANION GAP SERPL CALCULATED.3IONS-SCNC: 11 MMOL/L (ref 10–20)
AST SERPL W P-5'-P-CCNC: 18 U/L (ref 9–39)
BILIRUB SERPL-MCNC: 0.4 MG/DL (ref 0–1.2)
BUN SERPL-MCNC: 30 MG/DL (ref 6–23)
CALCIUM SERPL-MCNC: 9.1 MG/DL (ref 8.6–10.3)
CHLORIDE SERPL-SCNC: 107 MMOL/L (ref 98–107)
CO2 SERPL-SCNC: 29 MMOL/L (ref 21–32)
CREAT SERPL-MCNC: 1.6 MG/DL (ref 0.5–1.05)
EGFRCR SERPLBLD CKD-EPI 2021: 34 ML/MIN/1.73M*2
GLUCOSE SERPL-MCNC: 122 MG/DL (ref 74–99)
POTASSIUM SERPL-SCNC: 4.7 MMOL/L (ref 3.5–5.3)
PROT SERPL-MCNC: 6.7 G/DL (ref 6.4–8.2)
SODIUM SERPL-SCNC: 142 MMOL/L (ref 136–145)

## 2024-10-14 PROCEDURE — 80053 COMPREHEN METABOLIC PANEL: CPT

## 2024-10-14 PROCEDURE — 83036 HEMOGLOBIN GLYCOSYLATED A1C: CPT

## 2024-10-14 PROCEDURE — 36415 COLL VENOUS BLD VENIPUNCTURE: CPT

## 2024-10-15 ENCOUNTER — APPOINTMENT (OUTPATIENT)
Dept: CARDIOLOGY | Facility: CLINIC | Age: 74
End: 2024-10-15
Payer: MEDICARE

## 2024-10-15 LAB
EST. AVERAGE GLUCOSE BLD GHB EST-MCNC: 192 MG/DL
HBA1C MFR BLD: 8.3 %

## 2024-10-16 PROCEDURE — 88305 TISSUE EXAM BY PATHOLOGIST: CPT

## 2024-10-17 ENCOUNTER — LAB REQUISITION (OUTPATIENT)
Dept: LAB | Facility: HOSPITAL | Age: 74
End: 2024-10-17
Payer: MEDICARE

## 2024-10-17 DIAGNOSIS — D37.02 NEOPLASM OF UNCERTAIN BEHAVIOR OF TONGUE: ICD-10-CM

## 2024-10-21 LAB
LABORATORY COMMENT REPORT: NORMAL
PATH REPORT.FINAL DX SPEC: NORMAL
PATH REPORT.GROSS SPEC: NORMAL
PATH REPORT.RELEVANT HX SPEC: NORMAL
PATH REPORT.TOTAL CANCER: NORMAL

## 2024-10-24 NOTE — PROGRESS NOTES
[facility]: [facility]  PROGRESS NOTE      Daniela Epstein is a 73 y.o. female that is presenting today for Follow-up.    Assessment/Plan   Diagnoses and all orders for this visit:  Type 2 diabetes mellitus with stage 3b chronic kidney disease, with long-term current use of insulin (Multi)  Vitamin D deficiency  Primary hypertension  Other hyperlipidemia  Primary osteoarthritis, unspecified site  Iron deficiency anemia, unspecified iron deficiency anemia type  Other orders  -     Follow Up In Primary Care - Established  We reviewed the current situation and her recent blood work with a summary as noted below:  Diabetes-A1c is 8.3.  This has not down a bit from the 8.6 which was down from the 11.  We therefore removing this in the right direction but it is still not optimal she is going to get further instruction from endocrinology.  Hypertension-she is doing fine really right now with no medicine.  Hyperlipidemia-will check lipids prior to her next appointment  Anemia-doing well now and I will recheck her numbers prior to her next appointment.  Ckd - improved    I have offered her a flu shot today which she declines.  I do not really think there is any reason for us to change anything else in her medicines looks like the biggest negotiation will still be with the endocrinology team as she thinks the glipizide is the most effective medicine for her and is really not that keen on the newer agents.    I will see her in 3 months  Subjective   Is here for a 3-month follow-up appointment.  At her last visit it looks like her numbers were coming around this been a few other adjustments since then.  She now is seeing endocrinology and they have made some adjustment to her oral agents.  She also had been to see the gastroenterologist and they recognized that her blood pressure was low.  She had called us and we had recommended she stop the lisinopril.  When reviewing her medicines on this visit we  also noticed that HCTZ was listed but she really has not been taking that for quite some time now.    She feels like she is getting herself in the right direction.  She been checking her sugars twice a day and brings in those for report.  She notices that despite all the newer medicines that we have given her that the glipizide still seems to be the most effective thing at reducing her sugars.      Review of Systems   Objective   Vitals:    10/25/24 1127   BP: 130/76   Pulse: 78   Temp: 36.3 °C (97.4 °F)      Body mass index is 37.55 kg/m².  Physical Exam  Constitutional:       Appearance: Normal appearance.   HENT:      Head: Normocephalic and atraumatic.   Cardiovascular:      Rate and Rhythm: Normal rate and regular rhythm.      Pulses:           Dorsalis pedis pulses are 3+ on the right side and 3+ on the left side.      Heart sounds: Normal heart sounds.   Pulmonary:      Effort: Pulmonary effort is normal.      Breath sounds: Normal breath sounds.   Musculoskeletal:      Cervical back: Normal range of motion and neck supple.   Feet:      Right foot:      Protective Sensation: 5 sites tested.   1 site sensed.     Skin integrity: Skin integrity normal.      Left foot:      Protective Sensation: 5 sites tested.   1 site sensed.     Skin integrity: Skin integrity normal.   Neurological:      Mental Status: She is alert.       Diagnostic Results   Lab Results   Component Value Date    GLUCOSE 122 (H) 10/14/2024    CALCIUM 9.1 10/14/2024     10/14/2024    K 4.7 10/14/2024    CO2 29 10/14/2024     10/14/2024    BUN 30 (H) 10/14/2024    CREATININE 1.60 (H) 10/14/2024     Lab Results   Component Value Date    ALT 15 10/14/2024    AST 18 10/14/2024    GGT 18 09/03/2018    ALKPHOS 143 (H) 10/14/2024    BILITOT 0.4 10/14/2024     Lab Results   Component Value Date    WBC 6.6 06/26/2024    HGB 11.1 (L) 06/26/2024    HCT 36.0 06/26/2024    MCV 89 06/26/2024     06/26/2024     Lab Results   Component Value  "Date    CHOL 187 07/23/2024    CHOL 167 01/25/2024    CHOL 148 07/06/2023     Lab Results   Component Value Date    HDL 73.0 07/23/2024    HDL 74.0 01/25/2024    HDL 65 07/06/2023     Lab Results   Component Value Date    LDLCALC 88 07/23/2024    LDLCALC 68 01/25/2024    LDLCALC 59 (L) 07/06/2023     Lab Results   Component Value Date    TRIG 130 07/23/2024    TRIG 127 01/25/2024    TRIG 121 07/06/2023     No components found for: \"CHOLHDL\"  Lab Results   Component Value Date    HGBA1C 8.3 (H) 10/14/2024     Other labs not included in the list above were reviewed either before or during this encounter.    History    Past Medical History:   Diagnosis Date    Abdominal pain 06/07/2024    Acoustic neuroma (Multi) 08/30/2023    Acute lower urinary tract infection 08/30/2023    Acute otitis externa 06/07/2024    Acute renal failure ([facility]) 03/27/2023    Acute urinary tract infection 03/27/2023    Affective psychosis, bipolar (Multi) 08/30/2023    Age-related nuclear cataract, left eye 10/16/2015    Age-related nuclear cataract of left eye    Age-related nuclear cataract, right eye 10/16/2015    Age-related nuclear cataract of right eye    Allergic     Anemia     Anxiety     Anxiety disorder 08/30/2023    Arthritis     Bilateral dry eyes 08/30/2023    Bipolar disorder 08/30/2023    Breast lump 08/30/2023    Cardiac device in situ 08/30/2023    Cerebrovascular accident (CVA) (Multi) 06/07/2024    Cerebrovascular accident (Multi) 08/30/2023    Closed fracture of phalanx of great toe 02/08/2023    Constipation 02/17/2022    Dehydration 06/07/2024    Depression 08/30/2023    Depression, unspecified 05/20/2014    Depression    Diabetes mellitus (Multi) 08/30/2023    Diabetes mellitus, type 2 (Multi) 08/30/2023    Dry eye syndrome of unspecified lacrimal gland 06/25/2015    Dry eye syndrome    Dysfunction of right eustachian tube 08/30/2023    Dyslipidemia 08/30/2023    Dysphagia 08/30/2023    Essential (primary) hypertension " 12/15/2013    Hypertension    Essential (primary) hypertension 08/30/2023    Fibrocystic breast changes 08/30/2023    Fracture of bone adjacent to prosthesis 02/14/2022    Gastroesophageal reflux disease 08/30/2023    Hazy vision 08/30/2023    Hip pain 06/07/2024    History of cardiovascular surgery 02/13/2024    Hx of breast cancer 06/08/2016    Hypokalemia 08/30/2023    Infection due to Escherichia coli 03/27/2023    Knee stiffness 01/16/2023    Mastodynia 08/30/2023    Mental disorder 08/30/2023    Mixed hyperlipidemia 08/30/2023    Myopia, bilateral 10/16/2015    High myopia, both eyes    Nausea & vomiting 08/30/2023    Nose injury 08/30/2023    Obesity 03/27/2023    Oral mucosal lesion 08/30/2023    Osteoarthritis 08/30/2023    Other disorders affecting eyelid function 06/25/2015    Excessive involuntary blinking    Overactive bladder 08/30/2023    Pain in left hand 02/06/2023    Personal history of diseases of the blood and blood-forming organs and certain disorders involving the immune mechanism     History of anemia    Personal history of other diseases of the digestive system     History of esophageal reflux    Personal history of other diseases of the nervous system and sense organs     History of cataract    Personal history of other diseases of urinary system     History of bladder problems    Personal history of other mental and behavioral disorders     History of mental disorder    Post-traumatic headache 02/13/2024    Pure hypercholesterolemia, unspecified 05/20/2014    High cholesterol    Rhinitis 08/30/2023    S/P knee replacement 08/30/2023    Severe myopia 05/07/2015    Snoring     Snoring    Solitary cyst of breast 08/30/2023    Stage 3b chronic kidney disease (Multi) 08/30/2023    Stenosis of lacrimal punctum 05/16/2019    Swelling, mass, or lump on face 08/30/2023    Syncope 08/30/2023    TMJ (temporomandibular joint syndrome) 08/30/2023    Type 2 diabetes mellitus without complications (Multi)  06/25/2015    Diabetes mellitus    Unspecified injury of left wrist, hand and finger(s), initial encounter 02/06/2023    Unsteady gait 08/30/2023    Vaginal irritation 05/15/2024    Vitamin D deficiency 08/30/2023    Weakness 03/27/2023     Past Surgical History:   Procedure Laterality Date    ANKLE SURGERY  1998    injury    APPENDECTOMY  1972    BLEPHAROPLASTY Bilateral 2010    BREAST BIOPSY  2011    BREAST LUMPECTOMY Right 2011    atypical hyperplasia    CARDIAC SURGERY  03/06/2023    implant cardiac event recorder for recurrent syncope/ Dr. Fowler    CARPAL TUNNEL RELEASE Left 2010    Neuroplasty Decompression Median Nerve At Carpal Tunnel    CARPAL TUNNEL RELEASE Right 2010    CHOLECYSTECTOMY  1979    COLONOSCOPY  2007    COLONOSCOPY  2013    COLONOSCOPY  2014    COLONOSCOPY  11/06/2020    COLONOSCOPY  06/2024    CRANIOTOMY Right 2006    acoustic neuroma    CT ANGIO NECK  03/22/2023    CT NECK ANGIO W AND WO IV CONTRAST [facility] CT    CT HEAD ANGIO W AND WO IV CONTRAST  03/22/2023    CT HEAD ANGIO W AND WO IV CONTRAST [facility]    DILATION AND CURETTAGE OF UTERUS  1971    DILATION AND CURETTAGE OF UTERUS  2013    ESOPHAGOGASTRODUODENOSCOPY  2007    ESOPHAGOGASTRODUODENOSCOPY  2010    ESOPHAGOGASTRODUODENOSCOPY  2020    EYE SURGERY      FRACTURE SURGERY      HEMORRHOID SURGERY  1990    JOINT REPLACEMENT      KNEE SURGERY Right 2008    scar tissue    MOUTH SURGERY Right 2017    right buccal lesion    MR HEAD ANGIO WO IV CONTRAST  01/18/2015    MR HEAD ANGIO WO IV CONTRAST [facility]    MR HEAD ANGIO WO IV CONTRAST  05/08/2016    MR HEAD ANGIO WO IV CONTRAST [facility]    MR HEAD ANGIO WO IV CONTRAST  02/28/2017    MR HEAD ANGIO WO IV CONTRAST [facility]    MR NECK ANGIO WO IV CONTRAST  01/18/2015    MR NECK ANGIO WO IV CONTRAST [facility]    NASAL SINUS SURGERY  1985    ORIF FEMUR FRACTURE Right 2022    OVARIAN CYST REMOVAL  1972    SEPTOPLASTY  05/20/2014    Septoplasty     TONSILLECTOMY      TOTAL KNEE ARTHROPLASTY Right 2007    TOTAL KNEE ARTHROPLASTY Left 2011    TUBAL LIGATION  1983    URETHRAL SLING  2013     Family History   Problem Relation Name Age of Onset    Arthritis Mother Paty     Breast cancer Mother Paty     Diabetes Mother Paty     Hypertension Mother Paty     Heart failure Father Luis     Heart disease Father Luis     Arthritis Other SIBLINGS     Diabetes Other SIBLINGS     Hypertension Other SIBLINGS      Social History     Socioeconomic History    Marital status:      Spouse name: Not on file    Number of children: Not on file    Years of education: Not on file    Highest education level: Not on file   Occupational History    Not on file   Tobacco Use    Smoking status: Never     Passive exposure: Never    Smokeless tobacco: Never   Vaping Use    Vaping status: Never Used   Substance and Sexual Activity    Alcohol use: Yes    Drug use: Never    Sexual activity: Defer   Other Topics Concern    Not on file   Social History Narrative    Not on file     Social Drivers of Health     Financial Resource Strain: Not on file   Food Insecurity: Not on file   Transportation Needs: Not on file   Physical Activity: Not on file   Stress: Not on file   Social Connections: Not on file   Intimate Partner Violence: Not on file   Housing Stability: Not on file     Allergies   Allergen Reactions    Other Other     TARTICK DYSKENESIA    Phenothiazines Hives, Shortness of breath, Other and Unknown     Slurred speech    Erythromycin Hives, Unknown and Rash    Fish Oil Unknown    Haloperidol Unknown     Current Outpatient Medications on File Prior to Visit   Medication Sig Dispense Refill    atorvastatin (Lipitor) 40 mg tablet Take 1 tablet (40 mg) by mouth once daily. 90 tablet 3    blood sugar diagnostic strip Inject under the skin once daily.      cholecalciferol (Vitamin D3) 25 MCG (1000 UT) tablet Take 2 tablets (50 mcg) by mouth once daily.      docusate sodium  "(Colace) 100 mg capsule Take 1 capsule (100 mg) by mouth once daily as needed.      empagliflozin (Jardiance) 25 mg Take 1 tablet (25 mg) by mouth once daily. 90 tablet 3    famotidine (Pepcid) 40 mg tablet Take 1 tablet (40 mg) by mouth once daily. 90 tablet 3    ferrous sulfate 325 (65 Fe) MG EC tablet Take 1 tablet by mouth. Every other week      fluocinonide 0.05 % cream Apply topically to affected areas on the legs twice a day as needed flares      fluticasone (Flonase) 50 mcg/actuation nasal spray Administer 1 spray into each nostril once daily as needed.      glipiZIDE (Glucotrol) 5 mg tablet Take 1 tablet (5 mg) by mouth 2 times a day. 2 tabs in the am, 1 tablet in the pm 270 tablet 3    hydroCHLOROthiazide (HYDRODiuril) 12.5 mg tablet Take 1 tablet (12.5 mg) by mouth once daily in the morning.      imipramine (Tofranil) 25 mg tablet Take 1 tablet (25 mg) by mouth once daily at bedtime. 90 tablet 1    insulin glargine (Basaglar KwikPen U-100 Insulin) 100 unit/mL (3 mL) pen Inject 12 Units under the skin once daily at bedtime. Take as directed per insulin instructions. 15 mL 3    lamoTRIgine (LaMICtal) 200 mg tablet Take 1 tablet (200 mg) by mouth once daily. ODT?      linaGLIPtin (Tradjenta) 5 mg tablet Take 1 tablet (5 mg) by mouth once daily. 90 tablet 3    lisinopril 5 mg tablet Take 1 tablet (5 mg) by mouth once daily. 90 tablet 3    loperamide (Imodium A-D) 2 mg tablet Take 1 tablet (2 mg) by mouth 4 times a day as needed.      loratadine (Claritin) 10 mg tablet Take 1 tablet (10 mg) by mouth if needed.      lubiprostone (Amitiza) 8 mcg capsule once daily with breakfast.      pen needle, diabetic 32 gauge x 5/32\" needle Use daily with insulin injection. 100 each 1    polyethylene glycol (Miralax) 17 gram packet Take 17 g by mouth once daily as needed. MIX WITH 8 OZ FLUID      sertraline (Zoloft) 50 mg tablet Take 1 tablet (50 mg) by mouth once daily.      lactulose 10 gram/15 mL solution  (Patient not " taking: Reported on 10/25/2024)       No current facility-administered medications on file prior to visit.     Immunization History   Administered Date(s) Administered    DT (pediatric) 05/29/2007    Influenza, seasonal, injectable 10/29/2010, 11/25/2011    Pfizer Purple Cap SARS-CoV-2 03/31/2021, 04/24/2021, 11/04/2021    Pneumococcal conjugate vaccine, 13-valent (PREVNAR 13) 05/23/2018    Pneumococcal conjugate vaccine, 20-valent (PREVNAR 20) 07/12/2023    Pneumococcal polysaccharide vaccine, 23-valent, age 2 years and older (PNEUMOVAX 23) 10/29/2006    Tdap vaccine, age 7 year and older (BOOSTRIX, ADACEL) 05/23/2018     Patient's medical history was reviewed and updated either before or during this encounter.       Pablo Montoya MD

## 2024-10-25 ENCOUNTER — OFFICE VISIT (OUTPATIENT)
Dept: PRIMARY CARE | Facility: CLINIC | Age: 74
End: 2024-10-25
Payer: MEDICARE

## 2024-10-25 VITALS
HEART RATE: 78 BPM | HEIGHT: 63 IN | SYSTOLIC BLOOD PRESSURE: 130 MMHG | TEMPERATURE: 97.4 F | WEIGHT: 212 LBS | DIASTOLIC BLOOD PRESSURE: 76 MMHG | BODY MASS INDEX: 37.56 KG/M2

## 2024-10-25 DIAGNOSIS — E53.8 VITAMIN B12 DEFICIENCY: ICD-10-CM

## 2024-10-25 DIAGNOSIS — N18.32 TYPE 2 DIABETES MELLITUS WITH STAGE 3B CHRONIC KIDNEY DISEASE, WITH LONG-TERM CURRENT USE OF INSULIN (MULTI): Primary | ICD-10-CM

## 2024-10-25 DIAGNOSIS — R73.9 HYPERGLYCEMIA: ICD-10-CM

## 2024-10-25 DIAGNOSIS — G63 NEUROPATHY ASSOCIATED WITH ENDOCRINE DISORDER (MULTI): ICD-10-CM

## 2024-10-25 DIAGNOSIS — M19.91 PRIMARY OSTEOARTHRITIS, UNSPECIFIED SITE: ICD-10-CM

## 2024-10-25 DIAGNOSIS — Z79.4 TYPE 2 DIABETES MELLITUS WITH STAGE 3B CHRONIC KIDNEY DISEASE, WITH LONG-TERM CURRENT USE OF INSULIN (MULTI): Primary | ICD-10-CM

## 2024-10-25 DIAGNOSIS — I10 PRIMARY HYPERTENSION: ICD-10-CM

## 2024-10-25 DIAGNOSIS — D64.9 ANEMIA, UNSPECIFIED TYPE: ICD-10-CM

## 2024-10-25 DIAGNOSIS — E11.22 TYPE 2 DIABETES MELLITUS WITH STAGE 3B CHRONIC KIDNEY DISEASE, WITH LONG-TERM CURRENT USE OF INSULIN (MULTI): Primary | ICD-10-CM

## 2024-10-25 DIAGNOSIS — D50.9 IRON DEFICIENCY ANEMIA, UNSPECIFIED IRON DEFICIENCY ANEMIA TYPE: ICD-10-CM

## 2024-10-25 DIAGNOSIS — E66.01 OBESITY, MORBID (MULTI): ICD-10-CM

## 2024-10-25 DIAGNOSIS — E55.9 VITAMIN D DEFICIENCY: ICD-10-CM

## 2024-10-25 DIAGNOSIS — E78.49 OTHER HYPERLIPIDEMIA: ICD-10-CM

## 2024-10-25 DIAGNOSIS — N18.32 STAGE 3B CHRONIC KIDNEY DISEASE (MULTI): ICD-10-CM

## 2024-10-25 DIAGNOSIS — E34.9 NEUROPATHY ASSOCIATED WITH ENDOCRINE DISORDER (MULTI): ICD-10-CM

## 2024-10-25 PROBLEM — S06.5XAA: Status: RESOLVED | Noted: 2024-02-13 | Resolved: 2024-10-25

## 2024-10-25 PROBLEM — S06.A0XA: Status: RESOLVED | Noted: 2023-03-27 | Resolved: 2024-10-25

## 2024-10-25 PROBLEM — S06.5XAA TRAUMATIC SUBDURAL HEMORRHAGE WITH LOSS OF CONSCIOUSNESS STATUS UNKNOWN, INITIAL ENCOUNTER (MULTI): Status: RESOLVED | Noted: 2023-03-23 | Resolved: 2024-10-25

## 2024-10-25 PROBLEM — D33.3 ACOUSTIC NEUROMA (MULTI): Status: RESOLVED | Noted: 2023-08-30 | Resolved: 2024-10-25

## 2024-10-25 PROBLEM — I42.9 CARDIOMYOPATHY: Status: RESOLVED | Noted: 2023-08-30 | Resolved: 2024-10-25

## 2024-10-25 PROCEDURE — 3008F BODY MASS INDEX DOCD: CPT | Performed by: INTERNAL MEDICINE

## 2024-10-25 PROCEDURE — 3062F POS MACROALBUMINURIA REV: CPT | Performed by: INTERNAL MEDICINE

## 2024-10-25 PROCEDURE — 3078F DIAST BP <80 MM HG: CPT | Performed by: INTERNAL MEDICINE

## 2024-10-25 PROCEDURE — G2211 COMPLEX E/M VISIT ADD ON: HCPCS | Performed by: INTERNAL MEDICINE

## 2024-10-25 PROCEDURE — 99214 OFFICE O/P EST MOD 30 MIN: CPT | Performed by: INTERNAL MEDICINE

## 2024-10-25 PROCEDURE — 1160F RVW MEDS BY RX/DR IN RCRD: CPT | Performed by: INTERNAL MEDICINE

## 2024-10-25 PROCEDURE — 1159F MED LIST DOCD IN RCRD: CPT | Performed by: INTERNAL MEDICINE

## 2024-10-25 PROCEDURE — 3075F SYST BP GE 130 - 139MM HG: CPT | Performed by: INTERNAL MEDICINE

## 2024-10-25 PROCEDURE — 1036F TOBACCO NON-USER: CPT | Performed by: INTERNAL MEDICINE

## 2024-10-25 PROCEDURE — 3052F HG A1C>EQUAL 8.0%<EQUAL 9.0%: CPT | Performed by: INTERNAL MEDICINE

## 2024-10-25 PROCEDURE — 3048F LDL-C <100 MG/DL: CPT | Performed by: INTERNAL MEDICINE

## 2024-10-25 PROCEDURE — 1126F AMNT PAIN NOTED NONE PRSNT: CPT | Performed by: INTERNAL MEDICINE

## 2024-10-25 ASSESSMENT — PAIN SCALES - GENERAL: PAINLEVEL_OUTOF10: 0-NO PAIN

## 2024-10-25 ASSESSMENT — PATIENT HEALTH QUESTIONNAIRE - PHQ9
SUM OF ALL RESPONSES TO PHQ9 QUESTIONS 1 AND 2: 0
2. FEELING DOWN, DEPRESSED OR HOPELESS: NOT AT ALL
1. LITTLE INTEREST OR PLEASURE IN DOING THINGS: NOT AT ALL

## 2024-10-25 NOTE — PATIENT INSTRUCTIONS
It was nice to see you in follow-up today.  It looks like in general things have moved in the right direction here are a few important comments  1.  High blood pressure-it looks like you are doing fine without the lisinopril.  I also took off your list the hydrochlorothiazide because you told me you would stop that quite a while back so we clean that out of your medicine list.  2.  High cholesterol-I will recheck that with your next labs  3.  Diabetes-a little better from a tang ago at 8.3% hemoglobin A1c.  Please adjust medicines under the direction of your diabetes team.    I will look forward to seeing you in 3 months I did order a full set of blood work to be done prior to that appointment

## 2024-10-29 ENCOUNTER — APPOINTMENT (OUTPATIENT)
Dept: ENDOCRINOLOGY | Facility: CLINIC | Age: 74
End: 2024-10-29
Payer: MEDICARE

## 2024-10-31 ENCOUNTER — HOSPITAL ENCOUNTER (OUTPATIENT)
Dept: CARDIOLOGY | Facility: CLINIC | Age: 74
Discharge: HOME | End: 2024-10-31
Payer: MEDICARE

## 2024-10-31 DIAGNOSIS — Z95.9 CARDIAC DEVICE IN SITU: ICD-10-CM

## 2024-10-31 DIAGNOSIS — R55 SYNCOPE, UNSPECIFIED SYNCOPE TYPE: ICD-10-CM

## 2024-10-31 PROCEDURE — 93285 PRGRMG DEV EVAL SCRMS IP: CPT

## 2024-11-20 ENCOUNTER — LAB REQUISITION (OUTPATIENT)
Dept: LAB | Facility: HOSPITAL | Age: 74
End: 2024-11-20
Payer: MEDICARE

## 2024-11-20 ENCOUNTER — HOSPITAL ENCOUNTER (OUTPATIENT)
Dept: CARDIOLOGY | Facility: CLINIC | Age: 74
Discharge: HOME | End: 2024-11-20
Payer: MEDICARE

## 2024-11-20 DIAGNOSIS — Z95.9 CARDIAC DEVICE IN SITU: ICD-10-CM

## 2024-11-20 DIAGNOSIS — R55 SYNCOPE, UNSPECIFIED SYNCOPE TYPE: ICD-10-CM

## 2024-11-20 DIAGNOSIS — B37.31 ACUTE CANDIDIASIS OF VULVA AND VAGINA: ICD-10-CM

## 2024-11-20 PROCEDURE — 93298 REM INTERROG DEV EVAL SCRMS: CPT

## 2024-11-20 PROCEDURE — 87205 SMEAR GRAM STAIN: CPT

## 2024-11-20 PROCEDURE — 93298 REM INTERROG DEV EVAL SCRMS: CPT | Performed by: INTERNAL MEDICINE

## 2024-11-21 DIAGNOSIS — Z95.9 CARDIAC DEVICE IN SITU: Primary | ICD-10-CM

## 2024-11-21 DIAGNOSIS — I48.0 PAROXYSMAL ATRIAL FIBRILLATION (MULTI): ICD-10-CM

## 2024-11-22 ENCOUNTER — HOSPITAL ENCOUNTER (OUTPATIENT)
Dept: CARDIOLOGY | Facility: CLINIC | Age: 74
Discharge: HOME | End: 2024-11-22
Payer: MEDICARE

## 2024-11-22 DIAGNOSIS — R55 SYNCOPE, UNSPECIFIED SYNCOPE TYPE: ICD-10-CM

## 2024-11-22 DIAGNOSIS — Z95.9 CARDIAC DEVICE IN SITU: ICD-10-CM

## 2024-11-22 LAB
CLUE CELLS VAG LPF-#/AREA: ABNORMAL /[LPF]
NUGENT SCORE: 1
YEAST VAG WET PREP-#/AREA: PRESENT

## 2024-11-25 ENCOUNTER — HOSPITAL ENCOUNTER (OUTPATIENT)
Dept: CARDIOLOGY | Facility: CLINIC | Age: 74
Discharge: HOME | End: 2024-11-25
Payer: MEDICARE

## 2024-11-25 DIAGNOSIS — R55 SYNCOPE, UNSPECIFIED SYNCOPE TYPE: ICD-10-CM

## 2024-11-25 DIAGNOSIS — Z95.9 CARDIAC DEVICE IN SITU: ICD-10-CM

## 2024-11-27 ENCOUNTER — HOSPITAL ENCOUNTER (OUTPATIENT)
Dept: CARDIOLOGY | Facility: CLINIC | Age: 74
Discharge: HOME | End: 2024-11-27
Payer: MEDICARE

## 2024-11-27 DIAGNOSIS — Z95.9 CARDIAC DEVICE IN SITU: ICD-10-CM

## 2024-11-27 DIAGNOSIS — R55 SYNCOPE, UNSPECIFIED SYNCOPE TYPE: ICD-10-CM

## 2024-11-28 PROCEDURE — RXMED WILLOW AMBULATORY MEDICATION CHARGE

## 2024-12-02 ENCOUNTER — HOSPITAL ENCOUNTER (OUTPATIENT)
Dept: CARDIOLOGY | Facility: CLINIC | Age: 74
Discharge: HOME | End: 2024-12-02
Payer: MEDICARE

## 2024-12-02 DIAGNOSIS — R55 SYNCOPE, UNSPECIFIED SYNCOPE TYPE: ICD-10-CM

## 2024-12-02 DIAGNOSIS — Z95.9 CARDIAC DEVICE IN SITU: ICD-10-CM

## 2024-12-09 ENCOUNTER — PHARMACY VISIT (OUTPATIENT)
Dept: PHARMACY | Facility: CLINIC | Age: 74
End: 2024-12-09
Payer: COMMERCIAL

## 2024-12-09 PROCEDURE — RXMED WILLOW AMBULATORY MEDICATION CHARGE

## 2024-12-11 ENCOUNTER — PHARMACY VISIT (OUTPATIENT)
Dept: PHARMACY | Facility: CLINIC | Age: 74
End: 2024-12-11
Payer: COMMERCIAL

## 2024-12-12 ENCOUNTER — HOSPITAL ENCOUNTER (OUTPATIENT)
Dept: RADIOLOGY | Facility: CLINIC | Age: 74
Discharge: HOME | End: 2024-12-12
Payer: MEDICARE

## 2024-12-12 VITALS — WEIGHT: 212 LBS | BODY MASS INDEX: 37.55 KG/M2

## 2024-12-12 DIAGNOSIS — Z12.31 SCREENING MAMMOGRAM FOR BREAST CANCER: ICD-10-CM

## 2024-12-12 PROCEDURE — 77067 SCR MAMMO BI INCL CAD: CPT

## 2024-12-18 DIAGNOSIS — K21.9 GASTROESOPHAGEAL REFLUX DISEASE WITHOUT ESOPHAGITIS: Primary | ICD-10-CM

## 2024-12-18 RX ORDER — OMEPRAZOLE 40 MG/1
40 CAPSULE, DELAYED RELEASE ORAL
Qty: 90 CAPSULE | Refills: 3 | Status: SHIPPED | OUTPATIENT
Start: 2024-12-18 | End: 2025-12-18

## 2025-01-21 ENCOUNTER — PATIENT MESSAGE (OUTPATIENT)
Dept: ENDOCRINOLOGY | Facility: CLINIC | Age: 75
End: 2025-01-21
Payer: MEDICARE

## 2025-02-01 LAB
25(OH)D3+25(OH)D2 SERPL-MCNC: 35 NG/ML (ref 30–100)
ALBUMIN SERPL-MCNC: 3.9 G/DL (ref 3.6–5.1)
ALP SERPL-CCNC: 138 U/L (ref 37–153)
ALT SERPL-CCNC: 10 U/L (ref 6–29)
ANION GAP SERPL CALCULATED.4IONS-SCNC: 11 MMOL/L (CALC) (ref 7–17)
AST SERPL-CCNC: 13 U/L (ref 10–35)
BASOPHILS # BLD AUTO: 113 CELLS/UL (ref 0–200)
BASOPHILS NFR BLD AUTO: 1.3 %
BILIRUB SERPL-MCNC: 0.5 MG/DL (ref 0.2–1.2)
BUN SERPL-MCNC: 26 MG/DL (ref 7–25)
CALCIUM SERPL-MCNC: 9.2 MG/DL (ref 8.6–10.4)
CHLORIDE SERPL-SCNC: 106 MMOL/L (ref 98–110)
CHOLEST SERPL-MCNC: 193 MG/DL
CHOLEST/HDLC SERPL: 2.6 (CALC)
CO2 SERPL-SCNC: 26 MMOL/L (ref 20–32)
CREAT SERPL-MCNC: 1.53 MG/DL (ref 0.6–1)
EGFRCR SERPLBLD CKD-EPI 2021: 35 ML/MIN/1.73M2
EOSINOPHIL # BLD AUTO: 131 CELLS/UL (ref 15–500)
EOSINOPHIL NFR BLD AUTO: 1.5 %
ERYTHROCYTE [DISTWIDTH] IN BLOOD BY AUTOMATED COUNT: 13 % (ref 11–15)
EST. AVERAGE GLUCOSE BLD GHB EST-MCNC: 220 MG/DL
EST. AVERAGE GLUCOSE BLD GHB EST-SCNC: 12.2 MMOL/L
FERRITIN SERPL-MCNC: 21 NG/ML (ref 16–288)
FOLATE SERPL-MCNC: 15.2 NG/ML
GLUCOSE SERPL-MCNC: 144 MG/DL (ref 65–99)
HBA1C MFR BLD: 9.3 % OF TOTAL HGB
HCT VFR BLD AUTO: 37.9 % (ref 35–45)
HDLC SERPL-MCNC: 74 MG/DL
HGB BLD-MCNC: 11.8 G/DL (ref 11.7–15.5)
IRON SATN MFR SERPL: 18 % (CALC) (ref 16–45)
IRON SERPL-MCNC: 63 MCG/DL (ref 45–160)
LDLC SERPL CALC-MCNC: 96 MG/DL (CALC)
LYMPHOCYTES # BLD AUTO: 1766 CELLS/UL (ref 850–3900)
LYMPHOCYTES NFR BLD AUTO: 20.3 %
MCH RBC QN AUTO: 26.8 PG (ref 27–33)
MCHC RBC AUTO-ENTMCNC: 31.1 G/DL (ref 32–36)
MCV RBC AUTO: 86.1 FL (ref 80–100)
MONOCYTES # BLD AUTO: 574 CELLS/UL (ref 200–950)
MONOCYTES NFR BLD AUTO: 6.6 %
NEUTROPHILS # BLD AUTO: 6116 CELLS/UL (ref 1500–7800)
NEUTROPHILS NFR BLD AUTO: 70.3 %
NONHDLC SERPL-MCNC: 119 MG/DL (CALC)
PLATELET # BLD AUTO: 341 THOUSAND/UL (ref 140–400)
PMV BLD REES-ECKER: 10.9 FL (ref 7.5–12.5)
POTASSIUM SERPL-SCNC: 4.8 MMOL/L (ref 3.5–5.3)
PROT SERPL-MCNC: 7 G/DL (ref 6.1–8.1)
RBC # BLD AUTO: 4.4 MILLION/UL (ref 3.8–5.1)
SODIUM SERPL-SCNC: 143 MMOL/L (ref 135–146)
TIBC SERPL-MCNC: 352 MCG/DL (CALC) (ref 250–450)
TRIGL SERPL-MCNC: 133 MG/DL
TSH SERPL-ACNC: 3.24 MIU/L (ref 0.4–4.5)
VIT B12 SERPL-MCNC: 489 PG/ML (ref 200–1100)
WBC # BLD AUTO: 8.7 THOUSAND/UL (ref 3.8–10.8)

## 2025-02-07 ENCOUNTER — HOSPITAL ENCOUNTER (OUTPATIENT)
Dept: CARDIOLOGY | Facility: CLINIC | Age: 75
Discharge: HOME | End: 2025-02-07
Payer: MEDICARE

## 2025-02-07 ENCOUNTER — OFFICE VISIT (OUTPATIENT)
Dept: PRIMARY CARE | Facility: CLINIC | Age: 75
End: 2025-02-07
Payer: MEDICARE

## 2025-02-07 VITALS
OXYGEN SATURATION: 99 % | SYSTOLIC BLOOD PRESSURE: 146 MMHG | BODY MASS INDEX: 36.68 KG/M2 | HEART RATE: 85 BPM | DIASTOLIC BLOOD PRESSURE: 80 MMHG | HEIGHT: 63 IN | TEMPERATURE: 96.5 F | WEIGHT: 207 LBS

## 2025-02-07 DIAGNOSIS — M19.91 PRIMARY OSTEOARTHRITIS, UNSPECIFIED SITE: ICD-10-CM

## 2025-02-07 DIAGNOSIS — E66.01 OBESITY, MORBID (MULTI): ICD-10-CM

## 2025-02-07 DIAGNOSIS — E11.22 TYPE 2 DIABETES MELLITUS WITH STAGE 3B CHRONIC KIDNEY DISEASE, WITH LONG-TERM CURRENT USE OF INSULIN (MULTI): Primary | ICD-10-CM

## 2025-02-07 DIAGNOSIS — E78.49 OTHER HYPERLIPIDEMIA: ICD-10-CM

## 2025-02-07 DIAGNOSIS — N18.32 TYPE 2 DIABETES MELLITUS WITH STAGE 3B CHRONIC KIDNEY DISEASE, WITH LONG-TERM CURRENT USE OF INSULIN (MULTI): Primary | ICD-10-CM

## 2025-02-07 DIAGNOSIS — E34.9 NEUROPATHY ASSOCIATED WITH ENDOCRINE DISORDER (MULTI): ICD-10-CM

## 2025-02-07 DIAGNOSIS — N18.4 STAGE 4 CHRONIC KIDNEY DISEASE (MULTI): ICD-10-CM

## 2025-02-07 DIAGNOSIS — Z79.4 TYPE 2 DIABETES MELLITUS WITH STAGE 3B CHRONIC KIDNEY DISEASE, WITH LONG-TERM CURRENT USE OF INSULIN (MULTI): Primary | ICD-10-CM

## 2025-02-07 DIAGNOSIS — D50.9 IRON DEFICIENCY ANEMIA, UNSPECIFIED IRON DEFICIENCY ANEMIA TYPE: ICD-10-CM

## 2025-02-07 DIAGNOSIS — R55 SYNCOPE AND COLLAPSE: ICD-10-CM

## 2025-02-07 DIAGNOSIS — I10 PRIMARY HYPERTENSION: ICD-10-CM

## 2025-02-07 DIAGNOSIS — R73.9 HYPERGLYCEMIA: ICD-10-CM

## 2025-02-07 DIAGNOSIS — G63 NEUROPATHY ASSOCIATED WITH ENDOCRINE DISORDER (MULTI): ICD-10-CM

## 2025-02-07 DIAGNOSIS — D64.9 ANEMIA, UNSPECIFIED TYPE: ICD-10-CM

## 2025-02-07 DIAGNOSIS — E53.8 VITAMIN B12 DEFICIENCY: ICD-10-CM

## 2025-02-07 DIAGNOSIS — E55.9 VITAMIN D DEFICIENCY: ICD-10-CM

## 2025-02-07 DIAGNOSIS — F31.9 BIPOLAR AFFECTIVE DISORDER, REMISSION STATUS UNSPECIFIED (MULTI): ICD-10-CM

## 2025-02-07 LAB — BODY SURFACE AREA: 2.04 M2

## 2025-02-07 PROCEDURE — 99214 OFFICE O/P EST MOD 30 MIN: CPT | Performed by: INTERNAL MEDICINE

## 2025-02-07 PROCEDURE — 1036F TOBACCO NON-USER: CPT | Performed by: INTERNAL MEDICINE

## 2025-02-07 PROCEDURE — 3008F BODY MASS INDEX DOCD: CPT | Performed by: INTERNAL MEDICINE

## 2025-02-07 PROCEDURE — 3077F SYST BP >= 140 MM HG: CPT | Performed by: INTERNAL MEDICINE

## 2025-02-07 PROCEDURE — 1159F MED LIST DOCD IN RCRD: CPT | Performed by: INTERNAL MEDICINE

## 2025-02-07 PROCEDURE — 3079F DIAST BP 80-89 MM HG: CPT | Performed by: INTERNAL MEDICINE

## 2025-02-07 PROCEDURE — G2211 COMPLEX E/M VISIT ADD ON: HCPCS | Performed by: INTERNAL MEDICINE

## 2025-02-07 PROCEDURE — 1126F AMNT PAIN NOTED NONE PRSNT: CPT | Performed by: INTERNAL MEDICINE

## 2025-02-07 PROCEDURE — 93298 REM INTERROG DEV EVAL SCRMS: CPT

## 2025-02-07 PROCEDURE — 1160F RVW MEDS BY RX/DR IN RCRD: CPT | Performed by: INTERNAL MEDICINE

## 2025-02-07 ASSESSMENT — LIFESTYLE VARIABLES
HOW OFTEN DO YOU HAVE SIX OR MORE DRINKS ON ONE OCCASION: NEVER
HOW OFTEN DURING THE LAST YEAR HAVE YOU FAILED TO DO WHAT WAS NORMALLY EXPECTED FROM YOU BECAUSE OF DRINKING: NEVER
HOW OFTEN DURING THE LAST YEAR HAVE YOU NEEDED AN ALCOHOLIC DRINK FIRST THING IN THE MORNING TO GET YOURSELF GOING AFTER A NIGHT OF HEAVY DRINKING: NEVER
AUDIT TOTAL SCORE: 0
SKIP TO QUESTIONS 9-10: 1
HAVE YOU OR SOMEONE ELSE BEEN INJURED AS A RESULT OF YOUR DRINKING: NO
AUDIT-C TOTAL SCORE: 0
HOW OFTEN DURING THE LAST YEAR HAVE YOU FOUND THAT YOU WERE NOT ABLE TO STOP DRINKING ONCE YOU HAD STARTED: NEVER
HAS A RELATIVE, FRIEND, DOCTOR, OR ANOTHER HEALTH PROFESSIONAL EXPRESSED CONCERN ABOUT YOUR DRINKING OR SUGGESTED YOU CUT DOWN: NO
HOW OFTEN DURING THE LAST YEAR HAVE YOU BEEN UNABLE TO REMEMBER WHAT HAPPENED THE NIGHT BEFORE BECAUSE YOU HAD BEEN DRINKING: NEVER
HOW OFTEN DO YOU HAVE A DRINK CONTAINING ALCOHOL: NEVER
HOW MANY STANDARD DRINKS CONTAINING ALCOHOL DO YOU HAVE ON A TYPICAL DAY: PATIENT DOES NOT DRINK
HOW OFTEN DURING THE LAST YEAR HAVE YOU HAD A FEELING OF GUILT OR REMORSE AFTER DRINKING: NEVER

## 2025-02-07 ASSESSMENT — ENCOUNTER SYMPTOMS
CARDIOVASCULAR NEGATIVE: 1
LOSS OF SENSATION IN FEET: 0
ENDOCRINE NEGATIVE: 1
ARTHRALGIAS: 1
GASTROINTESTINAL NEGATIVE: 1
DEPRESSION: 0
RESPIRATORY NEGATIVE: 1
OCCASIONAL FEELINGS OF UNSTEADINESS: 1

## 2025-02-07 ASSESSMENT — PAIN SCALES - GENERAL: PAINLEVEL_OUTOF10: 0-NO PAIN

## 2025-02-07 NOTE — PROGRESS NOTES
[facility]: [facility]  PROGRESS NOTE      Daniela Epstein is a 74 y.o. female that is presenting today for Follow-up.    Assessment/Plan   Diagnoses and all orders for this visit:  Type 2 diabetes mellitus with stage 3b chronic kidney disease, with long-term current use of insulin (Multi)  Vitamin D deficiency  Primary hypertension  -     CBC and Auto Differential; Future  -     Comprehensive Metabolic Panel; Future  Primary osteoarthritis, unspecified site  Iron deficiency anemia, unspecified iron deficiency anemia type  Anemia, unspecified type  Obesity, morbid (Multi)  Other hyperlipidemia  Neuropathy associated with endocrine disorder (Multi)  Vitamin B12 deficiency  Stage 4 chronic kidney disease (Multi)  Bipolar affective disorder, remission status unspecified (Multi)  Hyperglycemia  -     Hemoglobin A1C; Future  Other orders  -     Follow Up In Primary Care - Established  -     Follow Up In Primary Care - Medicare Annual; Future  We reviewed her overall situation.  We also reviewed the recent blood work as it part of that assessment  1.  Diabetes-her control continues to be poor.  We had gotten her hemoglobin A1c all the way down to 8.3% and now she is back over 9% again.  She is now taking multiple products as noted in the medicines we have been adjusting these further today she is going to be seeing endocrinology next week and I will defer this to them.  Note that we also had discussed having a CGM applied in the past she has not had that because she was swimming and did not need/want the device.  2.  Hypertension-stable on current program  3.  Hyperlipidemia-LDL slightly above goal but I would really like to focus on her glycemic control first  4.  History of anemia-doing well right now with normal iron B12 folate.  5.  CKD-stable creatinines are actually better now than they had been a few months ago.    No changes in her medicines will be made today we will see what endocrinology  has to say about her sugars next week.  Subjective   This 74-year-old is here for her 3-month follow-up appointment.  She for the most part has been feeling about the same although she continues to have problems with her glycemic control.  She notices her sugars in the morning are usually relatively low but by dinnertime she is up over 200.  She has not had any symptomatic hypoglycemia.    She also is working with the neurology team regarding some involuntary motions and they are adjusting her medications.      Review of Systems   Respiratory: Negative.     Cardiovascular: Negative.    Gastrointestinal: Negative.    Endocrine: Negative.    Genitourinary: Negative.    Musculoskeletal:  Positive for arthralgias.      Objective   Vitals:    02/07/25 1109   BP: 146/80   Pulse: 85   Temp: 35.8 °C (96.5 °F)   SpO2: 99%      Body mass index is 36.67 kg/m².  Physical Exam  Cardiovascular:      Rate and Rhythm: Normal rate and regular rhythm.      Pulses: Normal pulses.      Heart sounds: Normal heart sounds.   Pulmonary:      Effort: Pulmonary effort is normal.      Breath sounds: Normal breath sounds.   Abdominal:      General: Abdomen is flat. Bowel sounds are normal.      Palpations: Abdomen is soft.   Musculoskeletal:         General: Normal range of motion.   Skin:     General: Skin is warm and dry.       Diagnostic Results   Lab Results   Component Value Date    GLUCOSE 144 (H) 01/31/2025    CALCIUM 9.2 01/31/2025     01/31/2025    K 4.8 01/31/2025    CO2 26 01/31/2025     01/31/2025    BUN 26 (H) 01/31/2025    CREATININE 1.53 (H) 01/31/2025     Lab Results   Component Value Date    ALT 10 01/31/2025    AST 13 01/31/2025    GGT 18 09/03/2018    ALKPHOS 138 01/31/2025    BILITOT 0.5 01/31/2025     Lab Results   Component Value Date    WBC 8.7 01/31/2025    HGB 11.8 01/31/2025    HCT 37.9 01/31/2025    MCV 86.1 01/31/2025     01/31/2025     Lab Results   Component Value Date    CHOL 193 01/31/2025     "CHOL 187 07/23/2024    CHOL 167 01/25/2024     Lab Results   Component Value Date    HDL 74 01/31/2025    HDL 73.0 07/23/2024    HDL 74.0 01/25/2024     Lab Results   Component Value Date    LDLCALC 96 01/31/2025    LDLCALC 88 07/23/2024    LDLCALC 68 01/25/2024     Lab Results   Component Value Date    TRIG 133 01/31/2025    TRIG 130 07/23/2024    TRIG 127 01/25/2024     No components found for: \"CHOLHDL\"  Lab Results   Component Value Date    HGBA1C 9.3 (H) 01/31/2025     Other labs not included in the list above were reviewed either before or during this encounter.    History    Past Medical History:   Diagnosis Date    Abdominal pain 06/07/2024    Acoustic neuroma (Multi) 08/30/2023    Acute lower urinary tract infection 08/30/2023    Acute otitis externa 06/07/2024    Acute renal failure (CMS-[facility]) 03/27/2023    Acute urinary tract infection 03/27/2023    Affective psychosis, bipolar (Multi) 08/30/2023    Age-related nuclear cataract, left eye 10/16/2015    Age-related nuclear cataract of left eye    Age-related nuclear cataract, right eye 10/16/2015    Age-related nuclear cataract of right eye    Allergic     Anemia     Anxiety     Anxiety disorder 08/30/2023    Arthritis     Bilateral dry eyes 08/30/2023    Bipolar disorder 08/30/2023    Breast lump 08/30/2023    Cardiac device in situ 08/30/2023    Cerebrovascular accident (CVA) (Multi) 06/07/2024    Cerebrovascular accident (Multi) 08/30/2023    Closed fracture of phalanx of great toe 02/08/2023    Constipation 02/17/2022    Dehydration 06/07/2024    Depression 08/30/2023    Depression, unspecified 05/20/2014    Depression    Diabetes mellitus (Multi) 08/30/2023    Diabetes mellitus, type 2 (Multi) 08/30/2023    Dry eye syndrome of unspecified lacrimal gland 06/25/2015    Dry eye syndrome    Dysfunction of right eustachian tube 08/30/2023    Dyslipidemia 08/30/2023    Dysphagia 08/30/2023    Essential (primary) hypertension 12/15/2013    Hypertension    " Essential (primary) hypertension 08/30/2023    Fibrocystic breast changes 08/30/2023    Fracture of bone adjacent to prosthesis 02/14/2022    Gastroesophageal reflux disease 08/30/2023    Hazy vision 08/30/2023    Hip pain 06/07/2024    History of cardiovascular surgery 02/13/2024    Hx of breast cancer 06/08/2016    Hypokalemia 08/30/2023    Infection due to Escherichia coli 03/27/2023    Knee stiffness 01/16/2023    Mastodynia 08/30/2023    Mental disorder 08/30/2023    Mixed hyperlipidemia 08/30/2023    Myopia, bilateral 10/16/2015    High myopia, both eyes    Nausea & vomiting 08/30/2023    Nose injury 08/30/2023    Obesity 03/27/2023    Oral mucosal lesion 08/30/2023    Osteoarthritis 08/30/2023    Other disorders affecting eyelid function 06/25/2015    Excessive involuntary blinking    Overactive bladder 08/30/2023    Pain in left hand 02/06/2023    Personal history of diseases of the blood and blood-forming organs and certain disorders involving the immune mechanism     History of anemia    Personal history of other diseases of the digestive system     History of esophageal reflux    Personal history of other diseases of the nervous system and sense organs     History of cataract    Personal history of other diseases of urinary system     History of bladder problems    Personal history of other mental and behavioral disorders     History of mental disorder    Post-traumatic headache 02/13/2024    Pure hypercholesterolemia, unspecified 05/20/2014    High cholesterol    Rhinitis 08/30/2023    S/P knee replacement 08/30/2023    Severe myopia 05/07/2015    Snoring     Snoring    Solitary cyst of breast 08/30/2023    Stage 3b chronic kidney disease (Multi) 08/30/2023    Stenosis of lacrimal punctum 05/16/2019    Swelling, mass, or lump on face 08/30/2023    Syncope 08/30/2023    TMJ (temporomandibular joint syndrome) 08/30/2023    Type 2 diabetes mellitus without complications (Multi) 06/25/2015    Diabetes  mellitus    Unspecified injury of left wrist, hand and finger(s), initial encounter 02/06/2023    Unsteady gait 08/30/2023    Vaginal irritation 05/15/2024    Vitamin D deficiency 08/30/2023    Weakness 03/27/2023     Past Surgical History:   Procedure Laterality Date    ANKLE SURGERY  1998    injury    APPENDECTOMY  1972    BLEPHAROPLASTY Bilateral 2010    BREAST BIOPSY  2011    BREAST LUMPECTOMY Right 2011    atypical hyperplasia    CARDIAC SURGERY  03/06/2023    implant cardiac event recorder for recurrent syncope/ Dr. Fowler    CARPAL TUNNEL RELEASE Left 2010    Neuroplasty Decompression Median Nerve At Carpal Tunnel    CARPAL TUNNEL RELEASE Right 2010    CHOLECYSTECTOMY  1979    COLONOSCOPY  2007    COLONOSCOPY  2013    COLONOSCOPY  2014    COLONOSCOPY  11/06/2020    COLONOSCOPY  06/2024    CRANIOTOMY Right 2006    acoustic neuroma    CT ANGIO NECK  03/22/2023    CT NECK ANGIO W AND WO IV CONTRAST [facility] CT    CT HEAD ANGIO W AND WO IV CONTRAST  03/22/2023    CT HEAD ANGIO W AND WO IV CONTRAST [facility]    DILATION AND CURETTAGE OF UTERUS  1971    DILATION AND CURETTAGE OF UTERUS  2013    ESOPHAGOGASTRODUODENOSCOPY  2007    ESOPHAGOGASTRODUODENOSCOPY  2010    ESOPHAGOGASTRODUODENOSCOPY  2020    EYE SURGERY      FRACTURE SURGERY      HEMORRHOID SURGERY  1990    JOINT REPLACEMENT      KNEE SURGERY Right 2008    scar tissue    MOUTH SURGERY Right 2017    right buccal lesion    MR HEAD ANGIO WO IV CONTRAST  01/18/2015    MR HEAD ANGIO WO IV CONTRAST [facility]    MR HEAD ANGIO WO IV CONTRAST  05/08/2016    MR HEAD ANGIO WO IV CONTRAST [facility]    MR HEAD ANGIO WO IV CONTRAST  02/28/2017    MR HEAD ANGIO WO IV CONTRAST [facility]    MR NECK ANGIO WO IV CONTRAST  01/18/2015    MR NECK ANGIO WO IV CONTRAST [facility]    NASAL SINUS SURGERY  1985    ORIF FEMUR FRACTURE Right 2022    OVARIAN CYST REMOVAL  1972    SEPTOPLASTY  05/20/2014    Septoplasty    TONSILLECTOMY      TOTAL KNEE  ARTHROPLASTY Right 2007    TOTAL KNEE ARTHROPLASTY Left 2011    TUBAL LIGATION  1983    URETHRAL SLING  2013     Family History   Problem Relation Name Age of Onset    Arthritis Mother Paty     Breast cancer Mother Paty     Diabetes Mother Paty     Hypertension Mother Paty     Heart failure Father Luis     Heart disease Father Luis     Arthritis Other SIBLINGS     Diabetes Other SIBLINGS     Hypertension Other SIBLINGS      Social History     Socioeconomic History    Marital status:      Spouse name: Not on file    Number of children: Not on file    Years of education: Not on file    Highest education level: Not on file   Occupational History    Not on file   Tobacco Use    Smoking status: Never     Passive exposure: Never    Smokeless tobacco: Never   Vaping Use    Vaping status: Never Used   Substance and Sexual Activity    Alcohol use: Not Currently    Drug use: Never    Sexual activity: Defer   Other Topics Concern    Not on file   Social History Narrative    Not on file     Social Drivers of Health     Financial Resource Strain: Not on file   Food Insecurity: Not on file   Transportation Needs: Not on file   Physical Activity: Not on file   Stress: Not on file   Social Connections: Not on file   Intimate Partner Violence: Not on file   Housing Stability: Not on file     Allergies   Allergen Reactions    Other Other     TARTICK DYSKENESIA    Phenothiazines Hives, Shortness of breath, Other and Unknown     Slurred speech    Erythromycin Hives, Unknown and Rash    Fish Oil Unknown    Haloperidol Unknown     Current Outpatient Medications on File Prior to Visit   Medication Sig Dispense Refill    apixaban (Eliquis) 5 mg tablet Take 1 tablet (5 mg) by mouth 2 times a day. 60 tablet 11    atorvastatin (Lipitor) 40 mg tablet Take 1 tablet (40 mg) by mouth once daily. 90 tablet 3    blood sugar diagnostic strip Inject under the skin once daily.      deutetrabenazine (Austedo) 12 mg tablet Take 1 tablet  "(12 mg) by mouth once daily. 2nd week 1 6 mg and 1 12 mg      docusate sodium (Colace) 100 mg capsule Take 1 capsule (100 mg) by mouth once daily as needed.      empagliflozin (Jardiance) 25 mg Take 1 tablet (25 mg) by mouth once daily. 90 tablet 3    famotidine (Pepcid) 40 mg tablet Take 1 tablet (40 mg) by mouth once daily. 90 tablet 3    fluocinonide 0.05 % cream Apply topically to affected areas on the legs twice a day as needed flares      fluticasone (Flonase) 50 mcg/actuation nasal spray Administer 1 spray into each nostril once daily as needed.      glipiZIDE (Glucotrol) 5 mg tablet Take 1 tablet (5 mg) by mouth 2 times a day. 2 tabs in the am, 1 tablet in the pm 270 tablet 3    imipramine (Tofranil) 25 mg tablet Take 1 tablet (25 mg) by mouth once daily at bedtime. 90 tablet 1    insulin glargine (Basaglar KwikPen U-100 Insulin) 100 unit/mL (3 mL) pen Inject 12 Units under the skin once daily at bedtime. Take as directed per insulin instructions. 15 mL 3    lamoTRIgine (LaMICtal) 200 mg tablet Take 1 tablet (200 mg) by mouth once daily. ODT?      linaGLIPtin (Tradjenta) 5 mg tablet Take 1 tablet (5 mg) by mouth once daily. 90 tablet 3    loperamide (Imodium A-D) 2 mg tablet Take 1 tablet (2 mg) by mouth 4 times a day as needed.      loratadine (Claritin) 10 mg tablet Take 1 tablet (10 mg) by mouth if needed.      lubiprostone (Amitiza) 8 mcg capsule once daily with breakfast.      omeprazole (PriLOSEC) 40 mg DR capsule Take 1 capsule (40 mg) by mouth once daily in the morning. Take before meals. Do not crush or chew. 90 capsule 3    pen needle, diabetic 32 gauge x 5/32\" needle Use daily with insulin injection. 100 each 1    sertraline (Zoloft) 50 mg tablet Take 1 tablet (50 mg) by mouth once daily.      polyethylene glycol (Miralax) 17 gram packet Take 17 g by mouth once daily as needed. MIX WITH 8 OZ FLUID      [DISCONTINUED] lactulose 10 gram/15 mL solution  (Patient not taking: Reported on 10/25/2024)   "     No current facility-administered medications on file prior to visit.     Immunization History   Administered Date(s) Administered    COVID-19, mRNA, LNP-S, PF, 30 mcg/0.3 mL dose 03/31/2021, 04/24/2021, 11/04/2021    DT (pediatric) 05/29/2007    Influenza, seasonal, injectable 10/29/2010, 11/25/2011    Pneumococcal conjugate vaccine, 13-valent (PREVNAR 13) 05/23/2018    Pneumococcal conjugate vaccine, 20-valent (PREVNAR 20) 07/12/2023    Pneumococcal polysaccharide vaccine, 23-valent, age 2 years and older (PNEUMOVAX 23) 10/29/2006    Tdap vaccine, age 7 year and older (BOOSTRIX, ADACEL) 05/23/2018     Patient's medical history was reviewed and updated either before or during this encounter.       Pablo Montoya MD

## 2025-02-07 NOTE — PATIENT INSTRUCTIONS
Lets keep your medications the same right now.  As we discussed I do not really like where your blood sugars are.  However, you are going to be seeing that diabetes doctors next week and I will defer to them in terms of next steps.    I will see you back in 3 months unless you need me sooner

## 2025-02-11 ENCOUNTER — APPOINTMENT (OUTPATIENT)
Dept: ENDOCRINOLOGY | Facility: CLINIC | Age: 75
End: 2025-02-11
Payer: MEDICARE

## 2025-02-11 ENCOUNTER — TELEPHONE (OUTPATIENT)
Dept: ENDOCRINOLOGY | Facility: CLINIC | Age: 75
End: 2025-02-11

## 2025-02-11 VITALS
DIASTOLIC BLOOD PRESSURE: 88 MMHG | BODY MASS INDEX: 36.5 KG/M2 | HEIGHT: 63 IN | SYSTOLIC BLOOD PRESSURE: 170 MMHG | HEART RATE: 77 BPM | WEIGHT: 206 LBS

## 2025-02-11 DIAGNOSIS — Z79.4 TYPE 2 DIABETES MELLITUS WITH STAGE 3B CHRONIC KIDNEY DISEASE, WITH LONG-TERM CURRENT USE OF INSULIN (MULTI): ICD-10-CM

## 2025-02-11 DIAGNOSIS — E11.22 TYPE 2 DIABETES MELLITUS WITH STAGE 3B CHRONIC KIDNEY DISEASE, WITH LONG-TERM CURRENT USE OF INSULIN (MULTI): ICD-10-CM

## 2025-02-11 DIAGNOSIS — I10 PRIMARY HYPERTENSION: ICD-10-CM

## 2025-02-11 DIAGNOSIS — E11.22 TYPE 2 DIABETES MELLITUS WITH STAGE 3B CHRONIC KIDNEY DISEASE, WITH LONG-TERM CURRENT USE OF INSULIN (MULTI): Primary | ICD-10-CM

## 2025-02-11 DIAGNOSIS — N18.32 TYPE 2 DIABETES MELLITUS WITH STAGE 3B CHRONIC KIDNEY DISEASE, WITH LONG-TERM CURRENT USE OF INSULIN (MULTI): ICD-10-CM

## 2025-02-11 DIAGNOSIS — E78.2 MIXED HYPERLIPIDEMIA: ICD-10-CM

## 2025-02-11 DIAGNOSIS — Z79.4 TYPE 2 DIABETES MELLITUS WITH STAGE 3B CHRONIC KIDNEY DISEASE, WITH LONG-TERM CURRENT USE OF INSULIN (MULTI): Primary | ICD-10-CM

## 2025-02-11 DIAGNOSIS — N18.32 TYPE 2 DIABETES MELLITUS WITH STAGE 3B CHRONIC KIDNEY DISEASE, WITH LONG-TERM CURRENT USE OF INSULIN (MULTI): Primary | ICD-10-CM

## 2025-02-11 PROCEDURE — 99214 OFFICE O/P EST MOD 30 MIN: CPT | Performed by: INTERNAL MEDICINE

## 2025-02-11 RX ORDER — INSULIN LISPRO 100 [IU]/ML
INJECTION, SOLUTION INTRAVENOUS; SUBCUTANEOUS
Qty: 15 ML | Refills: 1 | Status: SHIPPED | OUTPATIENT
Start: 2025-02-11 | End: 2025-02-11 | Stop reason: ALTCHOICE

## 2025-02-11 RX ORDER — INSULIN ASPART 100 [IU]/ML
INJECTION, SOLUTION INTRAVENOUS; SUBCUTANEOUS
Qty: 15 ML | Refills: 1 | Status: SHIPPED | OUTPATIENT
Start: 2025-02-11

## 2025-02-11 RX ORDER — INSULIN LISPRO 100 [IU]/ML
INJECTION, SOLUTION INTRAVENOUS; SUBCUTANEOUS
Qty: 15 ML | Refills: 1 | Status: SHIPPED | OUTPATIENT
Start: 2025-02-11 | End: 2025-02-11 | Stop reason: SDUPTHER

## 2025-02-11 RX ORDER — LISINOPRIL 10 MG/1
10 TABLET ORAL DAILY
Qty: 90 TABLET | Refills: 1 | Status: SHIPPED | OUTPATIENT
Start: 2025-02-11 | End: 2025-02-11 | Stop reason: ENTERED-IN-ERROR

## 2025-02-11 NOTE — PATIENT INSTRUCTIONS
Your most recent A1c was 9.3%.     Call [place] with [employer] to request the Maik 3 PLUS sensors and reader.     STOP Jardiance and Tradjenta.     Continue glipizide 10mg in the morning and 5mg in the evening.     Continue Basaglar 12 units once daily.     START Humalog 3 units twice daily with your meals.     START checking your sugars with the Freestyle Maik 3 sensors - call the office if you need help setting this up.     Start checking your blood pressure once daily at home and keep a log of your readings.     Follow up in 4 months.

## 2025-02-11 NOTE — PROGRESS NOTES
Subjective   Patient ID: Daniela Epstein is a 74 y.o. female who presents for Diabetes.  HPI  73 yo with DM Type 2 (+neuropathy, diagnosed in her 50's), CKD (Creatinine 2.2 GFR 23 Azem), HTN, HLD, Bipolar,Depression, TIA.   Last A1c 8.4% - recently 9.3% (Jan 2025).     Patient testing sugars 2 times day. No lows. Recalls blood sugars waking 100-120, before dinner 150-200, occ into 300's. Following carb controlled diet somewhat and know reasonable carb allowances. Patient able to afford their medications. Patient is exercising, attend aquatic therapy twice a week.   CGM discusssed and willing to try if affordable - provided info to call [employer].      Taking Basaglar 12 units daily, Jardiance 25mg daily, Tradjenta 5mg daily, and glipizide 10mg every morning and 5mg every evening.   -Metformin - stopped d/t CKD.  -GLP1 - history chronic constipation.  -Getting Jardiance and Tradjenta from  PAP however pt no longer qualifies and will not be able to re-enroll this year; will need to stop both meds d/t cost.     Was taking lisinopril 10 mg for htn - reports stopping this per PCP, unclear why.   Taking atorvastatin 40 mg daily for lipids.   -LDL calc 96    Current Outpatient Medications:     apixaban (Eliquis) 5 mg tablet, Take 1 tablet (5 mg) by mouth 2 times a day., Disp: 60 tablet, Rfl: 11    atorvastatin (Lipitor) 40 mg tablet, Take 1 tablet (40 mg) by mouth once daily., Disp: 90 tablet, Rfl: 3    blood sugar diagnostic strip, Inject under the skin once daily., Disp: , Rfl:     deutetrabenazine (Austedo) 12 mg tablet, Take 1 tablet (12 mg) by mouth once daily. 2nd week 1 6 mg and 1 12 mg, Disp: , Rfl:     docusate sodium (Colace) 100 mg capsule, Take 1 capsule (100 mg) by mouth once daily as needed., Disp: , Rfl:     empagliflozin (Jardiance) 25 mg, Take 1 tablet (25 mg) by mouth once daily., Disp: 90 tablet, Rfl: 3    famotidine (Pepcid) 40 mg tablet, Take 1 tablet (40 mg) by mouth once daily., Disp: 90  "tablet, Rfl: 3    fluocinonide 0.05 % cream, Apply topically to affected areas on the legs twice a day as needed flares, Disp: , Rfl:     fluticasone (Flonase) 50 mcg/actuation nasal spray, Administer 1 spray into each nostril once daily as needed., Disp: , Rfl:     glipiZIDE (Glucotrol) 5 mg tablet, Take 1 tablet (5 mg) by mouth 2 times a day. 2 tabs in the am, 1 tablet in the pm, Disp: 270 tablet, Rfl: 3    imipramine (Tofranil) 25 mg tablet, Take 1 tablet (25 mg) by mouth once daily at bedtime., Disp: 90 tablet, Rfl: 1    insulin aspart (NovoLOG Flexpen U-100 Insulin) 100 unit/mL (3 mL) pen, Take 3 units twice daily with meals as directed per insulin instructions., Disp: 15 mL, Rfl: 1    insulin glargine (Basaglar KwikPen U-100 Insulin) 100 unit/mL (3 mL) pen, Inject 12 Units under the skin once daily at bedtime. Take as directed per insulin instructions., Disp: 15 mL, Rfl: 3    lamoTRIgine (LaMICtal) 200 mg tablet, Take 1 tablet (200 mg) by mouth once daily. ODT?, Disp: , Rfl:     linaGLIPtin (Tradjenta) 5 mg tablet, Take 1 tablet (5 mg) by mouth once daily., Disp: 90 tablet, Rfl: 3    loperamide (Imodium A-D) 2 mg tablet, Take 1 tablet (2 mg) by mouth 4 times a day as needed., Disp: , Rfl:     loratadine (Claritin) 10 mg tablet, Take 1 tablet (10 mg) by mouth if needed., Disp: , Rfl:     lubiprostone (Amitiza) 8 mcg capsule, once daily with breakfast., Disp: , Rfl:     omeprazole (PriLOSEC) 40 mg DR capsule, Take 1 capsule (40 mg) by mouth once daily in the morning. Take before meals. Do not crush or chew., Disp: 90 capsule, Rfl: 3    pen needle, diabetic 32 gauge x 5/32\" needle, Use daily with insulin injection., Disp: 100 each, Rfl: 1    polyethylene glycol (Miralax) 17 gram packet, Take 17 g by mouth once daily as needed. MIX WITH 8 OZ FLUID, Disp: , Rfl:     sertraline (Zoloft) 50 mg tablet, Take 1 tablet (50 mg) by mouth once daily., Disp: , Rfl:    Allergies   Allergen Reactions    Other Other     TARTICK " "DYSKENESIA    Phenothiazines Hives, Shortness of breath, Other and Unknown     Slurred speech    Erythromycin Hives, Unknown and Rash    Fish Oil Unknown    Haloperidol Unknown       Review of Systems  See HPI.     Objective   /88   Pulse 77   Ht 1.6 m (5' 3\")   Wt 93.4 kg (206 lb)   BMI 36.49 kg/m²     Labs:   Lab Results   Component Value Date    HGBA1C 9.3 (H) 01/31/2025     Lab Results   Component Value Date    CALCIUM 9.2 01/31/2025    AST 13 01/31/2025    ALKPHOS 138 01/31/2025    BILITOT 0.5 01/31/2025    PROT 7.0 01/31/2025    ALBUMIN 3.9 01/31/2025    GLOB 2.9 07/06/2023    AGR 1.4 (L) 07/06/2023     01/31/2025    K 4.8 01/31/2025     01/31/2025    CO2 26 01/31/2025    ANIONGAP 11 01/31/2025    BUN 26 (H) 01/31/2025    CREATININE 1.53 (H) 01/31/2025    UREACREAUR 15.6 07/06/2023    GLUCOSE 144 (H) 01/31/2025    ALT 10 01/31/2025    EGFR 35 (L) 01/31/2025     Lab Results   Component Value Date    WBC 8.7 01/31/2025    NRBC 0.0 06/26/2024    RBC 4.40 01/31/2025    HGB 11.8 01/31/2025    HCT 37.9 01/31/2025     01/31/2025     Lab Results   Component Value Date    CHOL 193 01/31/2025    TRIG 133 01/31/2025    HDL 74 01/31/2025    LDLCALC 96 01/31/2025     Lab Results   Component Value Date    TPUC 16 06/26/2024    CREATU 56.1 06/26/2024    MICROALBCREA  06/26/2024      Comment:      One or more analytes used in this calculation is outside of the analytical measurement range. Calculation cannot be performed.     Lab Results   Component Value Date    TSH 3.24 01/31/2025     Lab Results   Component Value Date    KHUADUTD41 489 01/31/2025     Lab Results   Component Value Date    VITD25 35 01/31/2025     Lab Results   Component Value Date    .9 (H) 06/26/2024     Lab Results   Component Value Date    MG 1.82 03/24/2023       Assessment    1. Type 2 diabetes mellitus with stage 3b chronic kidney disease, with long-term current use of insulin (Multi)    2. Primary hypertension  "   3. Mixed hyperlipidemia        Medical Decision Making  Complexity of problem: Chronic illness of diabetes mellitus uncontrolled, progressing  Data analyzed and reviewed: Reviewed prior notes, blood glucose data, labs including HgbA1c, lipids, serum chemistries.  Ordered tests.   Risk of complications and morbidities: Is definite because of use of insulin and risk of hypoglycemia.  Prescription medications reviewed and modifications made.  Compliance assessed.  Addressed social determinants of health including food insecurity.    Plan   1. Type 2 diabetes mellitus with stage 3b chronic kidney disease, with long-term current use of insulin (Multi) (Primary)  - insulin aspart (NovoLOG Flexpen U-100 Insulin) 100 unit/mL (3 mL) pen; Take 3 units twice daily with meals as directed per insulin instructions.  Dispense: 15 mL; Refill: 1    -A1c reviewed.   -Reported SMBG data reviewed.   -Labs reviewed.     -A1c elevated today - pt with reduced kidney function, so metformin was stopped but sugars have increased; reports high sugars around dinner.    -Pt also no longer qualifies for  PAP d/t change in income - will not be able to afford Jardiance or Tradjenta going forward and will need to stop both meds.   -Given elevated sugars and limited medication options, will start fast acting prandial insulin 3 units twice daily with meals.   -Strongly encouraged pt to start Maik 3 CGM to better monitor sugars at home - pt agreeable to this if covered; provided info for [employer] to get supplies.   -Advised pt to call for training if there are openings in the schedule here, or she can meet with the pharmacist in her PCP office for training.   -Continue Basaglar 12 units daily and glipizide 10mg every morning and 5mg every evening.     2. Primary hypertension  -BP elevated today.   -Advised to contact PCP about this as lisinopril was previously discontinued by PCP.    -Monitor BP at home.     3. Mixed  hyperlipidemia  -On statin and tolerating.   -Continue current therapy.       -labs/tests/notes reviewed  -reviewed and counseled patient on medication monitoring and side effects    Follow Up: 4 monthsPharmD    Treatment and plan discussed with Dr. Patrick Diane.   LINCOLN Singleton, PharmD, BCACP, CDCES.

## 2025-02-11 NOTE — TELEPHONE ENCOUNTER
We are sending your prescription to [facility] for benefits investigation and affordability support.      If you have any questions or would like to check the status of your prescription(s),  please contact the pharmacy directly at [phone].

## 2025-02-13 DIAGNOSIS — I10 PRIMARY HYPERTENSION: Primary | ICD-10-CM

## 2025-02-13 RX ORDER — AMLODIPINE BESYLATE 2.5 MG/1
2.5 TABLET ORAL DAILY
Qty: 30 TABLET | Refills: 5 | Status: SHIPPED | OUTPATIENT
Start: 2025-02-13 | End: 2025-08-12

## 2025-02-17 ENCOUNTER — OFFICE VISIT (OUTPATIENT)
Dept: PRIMARY CARE | Facility: CLINIC | Age: 75
End: 2025-02-17
Payer: MEDICARE

## 2025-02-17 ENCOUNTER — CLINICAL SUPPORT (OUTPATIENT)
Dept: PRIMARY CARE | Facility: CLINIC | Age: 75
End: 2025-02-17
Payer: MEDICARE

## 2025-02-17 VITALS
WEIGHT: 210 LBS | TEMPERATURE: 97.3 F | OXYGEN SATURATION: 98 % | DIASTOLIC BLOOD PRESSURE: 84 MMHG | HEART RATE: 86 BPM | BODY MASS INDEX: 37.21 KG/M2 | SYSTOLIC BLOOD PRESSURE: 132 MMHG | HEIGHT: 63 IN

## 2025-02-17 DIAGNOSIS — E11.22 TYPE 2 DIABETES MELLITUS WITH STAGE 3B CHRONIC KIDNEY DISEASE, WITH LONG-TERM CURRENT USE OF INSULIN (MULTI): Primary | ICD-10-CM

## 2025-02-17 DIAGNOSIS — D50.9 IRON DEFICIENCY ANEMIA, UNSPECIFIED IRON DEFICIENCY ANEMIA TYPE: ICD-10-CM

## 2025-02-17 DIAGNOSIS — E78.2 MIXED HYPERLIPIDEMIA: ICD-10-CM

## 2025-02-17 DIAGNOSIS — K21.9 GASTROESOPHAGEAL REFLUX DISEASE WITHOUT ESOPHAGITIS: ICD-10-CM

## 2025-02-17 DIAGNOSIS — Z79.4 TYPE 2 DIABETES MELLITUS WITH STAGE 3B CHRONIC KIDNEY DISEASE, WITH LONG-TERM CURRENT USE OF INSULIN (MULTI): Primary | ICD-10-CM

## 2025-02-17 DIAGNOSIS — Z79.4 TYPE 2 DIABETES MELLITUS WITH STAGE 3B CHRONIC KIDNEY DISEASE, WITH LONG-TERM CURRENT USE OF INSULIN (MULTI): ICD-10-CM

## 2025-02-17 DIAGNOSIS — D64.9 ANEMIA, UNSPECIFIED TYPE: ICD-10-CM

## 2025-02-17 DIAGNOSIS — E78.49 OTHER HYPERLIPIDEMIA: ICD-10-CM

## 2025-02-17 DIAGNOSIS — N18.32 TYPE 2 DIABETES MELLITUS WITH STAGE 3B CHRONIC KIDNEY DISEASE, WITH LONG-TERM CURRENT USE OF INSULIN (MULTI): Primary | ICD-10-CM

## 2025-02-17 DIAGNOSIS — G63 NEUROPATHY ASSOCIATED WITH ENDOCRINE DISORDER (MULTI): ICD-10-CM

## 2025-02-17 DIAGNOSIS — E11.22 TYPE 2 DIABETES MELLITUS WITH STAGE 3B CHRONIC KIDNEY DISEASE, WITH LONG-TERM CURRENT USE OF INSULIN (MULTI): ICD-10-CM

## 2025-02-17 DIAGNOSIS — I10 PRIMARY HYPERTENSION: ICD-10-CM

## 2025-02-17 DIAGNOSIS — E55.9 VITAMIN D DEFICIENCY: ICD-10-CM

## 2025-02-17 DIAGNOSIS — E34.9 NEUROPATHY ASSOCIATED WITH ENDOCRINE DISORDER (MULTI): ICD-10-CM

## 2025-02-17 DIAGNOSIS — M19.91 PRIMARY OSTEOARTHRITIS, UNSPECIFIED SITE: ICD-10-CM

## 2025-02-17 DIAGNOSIS — N18.32 TYPE 2 DIABETES MELLITUS WITH STAGE 3B CHRONIC KIDNEY DISEASE, WITH LONG-TERM CURRENT USE OF INSULIN (MULTI): ICD-10-CM

## 2025-02-17 PROBLEM — R29.6 FALLS: Status: RESOLVED | Noted: 2024-10-21 | Resolved: 2025-02-17

## 2025-02-17 PROCEDURE — 99214 OFFICE O/P EST MOD 30 MIN: CPT | Performed by: INTERNAL MEDICINE

## 2025-02-17 PROCEDURE — G2211 COMPLEX E/M VISIT ADD ON: HCPCS | Performed by: INTERNAL MEDICINE

## 2025-02-17 PROCEDURE — 3075F SYST BP GE 130 - 139MM HG: CPT | Performed by: INTERNAL MEDICINE

## 2025-02-17 PROCEDURE — 1125F AMNT PAIN NOTED PAIN PRSNT: CPT | Performed by: INTERNAL MEDICINE

## 2025-02-17 PROCEDURE — 1160F RVW MEDS BY RX/DR IN RCRD: CPT | Performed by: INTERNAL MEDICINE

## 2025-02-17 PROCEDURE — 1159F MED LIST DOCD IN RCRD: CPT | Performed by: INTERNAL MEDICINE

## 2025-02-17 PROCEDURE — 3079F DIAST BP 80-89 MM HG: CPT | Performed by: INTERNAL MEDICINE

## 2025-02-17 PROCEDURE — 3008F BODY MASS INDEX DOCD: CPT | Performed by: INTERNAL MEDICINE

## 2025-02-17 RX ORDER — ATORVASTATIN CALCIUM 40 MG/1
40 TABLET, FILM COATED ORAL DAILY
Status: SHIPPED
Start: 2025-02-17 | End: 2026-02-17

## 2025-02-17 RX ORDER — AMLODIPINE BESYLATE 2.5 MG/1
5 TABLET ORAL DAILY
Status: SHIPPED
Start: 2025-02-17 | End: 2025-08-16

## 2025-02-17 ASSESSMENT — ENCOUNTER SYMPTOMS
GASTROINTESTINAL NEGATIVE: 1
CARDIOVASCULAR NEGATIVE: 1
LOSS OF SENSATION IN FEET: 0
HEADACHES: 1
OCCASIONAL FEELINGS OF UNSTEADINESS: 1
DEPRESSION: 0

## 2025-02-17 ASSESSMENT — PAIN SCALES - GENERAL: PAINLEVEL_OUTOF10: 10-WORST PAIN EVER

## 2025-02-17 NOTE — PROGRESS NOTES
[facility]: [facility]  PROGRESS NOTE      Daniela Epstein is a 74 y.o. female that is presenting today for ER Follow Up.    Assessment/Plan   Diagnoses and all orders for this visit:  Type 2 diabetes mellitus with stage 3b chronic kidney disease, with long-term current use of insulin (Multi)  Vitamin D deficiency  Primary hypertension  -     amLODIPine (Norvasc) 2.5 mg tablet; Take 2 tablets (5 mg) by mouth once daily.  Anemia, unspecified type  Iron deficiency anemia, unspecified iron deficiency anemia type  Primary osteoarthritis, unspecified site  Other hyperlipidemia  Neuropathy associated with endocrine disorder (Multi)  Gastroesophageal reflux disease without esophagitis  Mixed hyperlipidemia  -     atorvastatin (Lipitor) 40 mg tablet; Take 1 tablet (40 mg) by mouth once daily.  Current situation.  The most pressing issue right now is her blood pressure.  We are going to increase her amlodipine from 2.5 to 5 mg daily and then I will just see her back in about 2 weeks to make sure that her pressure has come down.    In terms of her diabetes will let her work with Dr. Rodriguez's team to get her sugars under better control  Subjective   Pain patient is here for follow-up.  The long and short of it is last week she had called and was worried that her blood pressure was going up we will prescribe the amlodipine 2.5 mg daily.  That was not started until Saturday.  Over the weekend she developed increasing headaches with blood pressures as high as 200 systolic for this reason she went to the [facility] there she was evaluated they gave her medication to lower her blood pressure and she was discharged home with part of that evaluation she had labs done including a CRP which was indicated as normal (in the doctor's note) she really just went home this morning and is here for follow-up care.    In the interim she has seen Dr. Rodriguez's team who has adjusted her diabetes medicines      Review of  "Systems   Cardiovascular: Negative.    Gastrointestinal: Negative.    Neurological:  Positive for headaches.      Objective   Vitals:    02/17/25 1326   BP: 132/84   Pulse: 86   Temp: 36.3 °C (97.3 °F)   SpO2: 98%      Body mass index is 37.2 kg/m².  Physical Exam  Cardiovascular:      Rate and Rhythm: Normal rate and regular rhythm.      Pulses: Normal pulses.      Heart sounds: Normal heart sounds.   Pulmonary:      Effort: Pulmonary effort is normal.      Breath sounds: Normal breath sounds.   Abdominal:      General: Abdomen is flat. Bowel sounds are normal.      Palpations: Abdomen is soft.   Musculoskeletal:         General: Normal range of motion.   Skin:     General: Skin is warm and dry.       Diagnostic Results   Lab Results   Component Value Date    GLUCOSE 144 (H) 01/31/2025    CALCIUM 9.2 01/31/2025     01/31/2025    K 4.8 01/31/2025    CO2 26 01/31/2025     01/31/2025    BUN 26 (H) 01/31/2025    CREATININE 1.53 (H) 01/31/2025     Lab Results   Component Value Date    ALT 10 01/31/2025    AST 13 01/31/2025    GGT 18 09/03/2018    ALKPHOS 138 01/31/2025    BILITOT 0.5 01/31/2025     Lab Results   Component Value Date    WBC 8.7 01/31/2025    HGB 11.8 01/31/2025    HCT 37.9 01/31/2025    MCV 86.1 01/31/2025     01/31/2025     Lab Results   Component Value Date    CHOL 193 01/31/2025    CHOL 187 07/23/2024    CHOL 167 01/25/2024     Lab Results   Component Value Date    HDL 74 01/31/2025    HDL 73.0 07/23/2024    HDL 74.0 01/25/2024     Lab Results   Component Value Date    LDLCALC 96 01/31/2025    LDLCALC 88 07/23/2024    LDLCALC 68 01/25/2024     Lab Results   Component Value Date    TRIG 133 01/31/2025    TRIG 130 07/23/2024    TRIG 127 01/25/2024     No components found for: \"CHOLHDL\"  Lab Results   Component Value Date    HGBA1C 9.3 (H) 01/31/2025     Other labs not included in the list above were reviewed either before or during this encounter.    History    Past Medical History: "   Diagnosis Date    Abdominal pain 06/07/2024    Acoustic neuroma (Multi) 08/30/2023    Acute lower urinary tract infection 08/30/2023    Acute otitis externa 06/07/2024    Acute renal failure ([facility]) 03/27/2023    Acute urinary tract infection 03/27/2023    Affective psychosis, bipolar (Multi) 08/30/2023    Age-related nuclear cataract, left eye 10/16/2015    Age-related nuclear cataract of left eye    Age-related nuclear cataract, right eye 10/16/2015    Age-related nuclear cataract of right eye    Allergic     Anemia     Anxiety     Anxiety disorder 08/30/2023    Arthritis     Bilateral dry eyes 08/30/2023    Bipolar disorder 08/30/2023    Breast lump 08/30/2023    Cardiac device in situ 08/30/2023    Cerebrovascular accident (CVA) (Multi) 06/07/2024    Cerebrovascular accident (Multi) 08/30/2023    Closed fracture of phalanx of great toe 02/08/2023    Constipation 02/17/2022    Dehydration 06/07/2024    Depression 08/30/2023    Depression, unspecified 05/20/2014    Depression    Diabetes mellitus (Multi) 08/30/2023    Diabetes mellitus, type 2 (Multi) 08/30/2023    Dry eye syndrome of unspecified lacrimal gland 06/25/2015    Dry eye syndrome    Dysfunction of right eustachian tube 08/30/2023    Dyslipidemia 08/30/2023    Dysphagia 08/30/2023    Essential (primary) hypertension 12/15/2013    Hypertension    Essential (primary) hypertension 08/30/2023    Falls 10/21/2024    Fibrocystic breast changes 08/30/2023    Fracture of bone adjacent to prosthesis 02/14/2022    Gastroesophageal reflux disease 08/30/2023    Hazy vision 08/30/2023    Hip pain 06/07/2024    History of cardiovascular surgery 02/13/2024    Hx of breast cancer 06/08/2016    Hypokalemia 08/30/2023    Infection due to Escherichia coli 03/27/2023    Knee stiffness 01/16/2023    Mastodynia 08/30/2023    Mental disorder 08/30/2023    Mixed hyperlipidemia 08/30/2023    Myopia, bilateral 10/16/2015    High myopia, both eyes    Nausea & vomiting  08/30/2023    Nose injury 08/30/2023    Obesity 03/27/2023    Oral mucosal lesion 08/30/2023    Osteoarthritis 08/30/2023    Other disorders affecting eyelid function 06/25/2015    Excessive involuntary blinking    Overactive bladder 08/30/2023    Pain in left hand 02/06/2023    Personal history of diseases of the blood and blood-forming organs and certain disorders involving the immune mechanism     History of anemia    Personal history of other diseases of the digestive system     History of esophageal reflux    Personal history of other diseases of the nervous system and sense organs     History of cataract    Personal history of other diseases of urinary system     History of bladder problems    Personal history of other mental and behavioral disorders     History of mental disorder    Post-traumatic headache 02/13/2024    Pure hypercholesterolemia, unspecified 05/20/2014    High cholesterol    Rhinitis 08/30/2023    S/P knee replacement 08/30/2023    Severe myopia 05/07/2015    Snoring     Snoring    Solitary cyst of breast 08/30/2023    Stage 3b chronic kidney disease (Multi) 08/30/2023    Stenosis of lacrimal punctum 05/16/2019    Swelling, mass, or lump on face 08/30/2023    Syncope 08/30/2023    TMJ (temporomandibular joint syndrome) 08/30/2023    Type 2 diabetes mellitus without complications (Multi) 06/25/2015    Diabetes mellitus    Unspecified injury of left wrist, hand and finger(s), initial encounter 02/06/2023    Unsteady gait 08/30/2023    Vaginal irritation 05/15/2024    Vitamin D deficiency 08/30/2023    Weakness 03/27/2023     Past Surgical History:   Procedure Laterality Date    ANKLE SURGERY  1998    injury    APPENDECTOMY  1972    BLEPHAROPLASTY Bilateral 2010    BREAST BIOPSY  2011    BREAST LUMPECTOMY Right 2011    atypical hyperplasia    CARDIAC SURGERY  03/06/2023    implant cardiac event recorder for recurrent syncope/ Dr. Fowler    CARPAL TUNNEL RELEASE Left 2010    Neuroplasty  Decompression Median Nerve At Carpal Tunnel    CARPAL TUNNEL RELEASE Right 2010    CHOLECYSTECTOMY  1979    COLONOSCOPY  2007    COLONOSCOPY  2013    COLONOSCOPY  2014    COLONOSCOPY  11/06/2020    COLONOSCOPY  06/2024    CRANIOTOMY Right 2006    acoustic neuroma    CT ANGIO NECK  03/22/2023    CT NECK ANGIO W AND WO IV CONTRAST [facility] CT    CT HEAD ANGIO W AND WO IV CONTRAST  03/22/2023    CT HEAD ANGIO W AND WO IV CONTRAST [facility]    DILATION AND CURETTAGE OF UTERUS  1971    DILATION AND CURETTAGE OF UTERUS  2013    ESOPHAGOGASTRODUODENOSCOPY  2007    ESOPHAGOGASTRODUODENOSCOPY  2010    ESOPHAGOGASTRODUODENOSCOPY  2020    EYE SURGERY      FRACTURE SURGERY      HEMORRHOID SURGERY  1990    JOINT REPLACEMENT      KNEE SURGERY Right 2008    scar tissue    MOUTH SURGERY Right 2017    right buccal lesion    MR HEAD ANGIO WO IV CONTRAST  01/18/2015    MR HEAD ANGIO WO IV CONTRAST [facility]    MR HEAD ANGIO WO IV CONTRAST  05/08/2016    MR HEAD ANGIO WO IV CONTRAST [facility]    MR HEAD ANGIO WO IV CONTRAST  02/28/2017    MR HEAD ANGIO WO IV CONTRAST [facility]    MR NECK ANGIO WO IV CONTRAST  01/18/2015    MR NECK ANGIO WO IV CONTRAST [facility]    NASAL SINUS SURGERY  1985    ORIF FEMUR FRACTURE Right 2022    OVARIAN CYST REMOVAL  1972    SEPTOPLASTY  05/20/2014    Septoplasty    TONSILLECTOMY      TOTAL KNEE ARTHROPLASTY Right 2007    TOTAL KNEE ARTHROPLASTY Left 2011    TUBAL LIGATION  1983    URETHRAL SLING  2013     Family History   Problem Relation Name Age of Onset    Arthritis Mother Paty     Breast cancer Mother Paty     Diabetes Mother Paty     Hypertension Mother Paty     Heart failure Father Luis     Heart disease Father Luis     Arthritis Other SIBLINGS     Diabetes Other SIBLINGS     Hypertension Other SIBLINGS      Social History     Socioeconomic History    Marital status:      Spouse name: Not on file    Number of children: Not on file    Years of  education: Not on file    Highest education level: Not on file   Occupational History    Not on file   Tobacco Use    Smoking status: Never     Passive exposure: Never    Smokeless tobacco: Never   Vaping Use    Vaping status: Never Used   Substance and Sexual Activity    Alcohol use: Not Currently    Drug use: Never    Sexual activity: Defer   Other Topics Concern    Not on file   Social History Narrative    Not on file     Social Drivers of Health     Financial Resource Strain: Not on file   Food Insecurity: Not on file   Transportation Needs: Not on file   Physical Activity: Not on file   Stress: Not on file   Social Connections: Not on file   Intimate Partner Violence: Not on file   Housing Stability: Not on file     Allergies   Allergen Reactions    Other Other     TARTICK DYSKENESIA    Phenothiazines Hives, Shortness of breath, Other and Unknown     Slurred speech    Erythromycin Hives, Unknown and Rash    Fish Oil Unknown    Haloperidol Unknown     Current Outpatient Medications on File Prior to Visit   Medication Sig Dispense Refill    blood sugar diagnostic strip Inject under the skin once daily.      fluocinonide 0.05 % cream Apply topically to affected areas on the legs twice a day as needed flares      glipiZIDE (Glucotrol) 5 mg tablet Take 1 tablet (5 mg) by mouth 2 times a day. 2 tabs in the am, 1 tablet in the pm 270 tablet 3    insulin aspart (NovoLOG Flexpen U-100 Insulin) 100 unit/mL (3 mL) pen Take 3 units twice daily with meals as directed per insulin instructions. 15 mL 1    insulin glargine (Basaglar KwikPen U-100 Insulin) 100 unit/mL (3 mL) pen Inject 12 Units under the skin once daily at bedtime. Take as directed per insulin instructions. 15 mL 3    lamoTRIgine (LaMICtal) 200 mg tablet Take 1 tablet (200 mg) by mouth once daily. ODT?      loperamide (Imodium A-D) 2 mg tablet Take 1 tablet (2 mg) by mouth 4 times a day as needed.      lubiprostone (Amitiza) 8 mcg capsule once daily with  "breakfast.      omeprazole (PriLOSEC) 40 mg DR capsule Take 1 capsule (40 mg) by mouth once daily in the morning. Take before meals. Do not crush or chew. 90 capsule 3    pen needle, diabetic 32 gauge x 5/32\" needle Use daily with insulin injection. 100 each 1    psyllium (Metamucil) 3.4 gram packet Take 1 packet by mouth if needed.      sertraline (Zoloft) 50 mg tablet Take 1 tablet (50 mg) by mouth once daily.      [DISCONTINUED] amLODIPine (Norvasc) 2.5 mg tablet Take 1 tablet (2.5 mg) by mouth once daily. 30 tablet 5    apixaban (Eliquis) 5 mg tablet Take 1 tablet (5 mg) by mouth 2 times a day. 60 tablet 11    imipramine (Tofranil) 25 mg tablet Take 1 tablet (25 mg) by mouth once daily at bedtime. 90 tablet 1    [DISCONTINUED] atorvastatin (Lipitor) 40 mg tablet Take 1 tablet (40 mg) by mouth once daily. (Patient not taking: Reported on 2/17/2025) 90 tablet 3    [DISCONTINUED] deutetrabenazine (Austedo) 12 mg tablet Take 1 tablet (12 mg) by mouth once daily. 2nd week 1 6 mg and 1 12 mg      [DISCONTINUED] docusate sodium (Colace) 100 mg capsule Take 1 capsule (100 mg) by mouth once daily as needed.      [DISCONTINUED] empagliflozin (Jardiance) 25 mg Take 1 tablet (25 mg) by mouth once daily. 90 tablet 3    [DISCONTINUED] famotidine (Pepcid) 40 mg tablet Take 1 tablet (40 mg) by mouth once daily. 90 tablet 3    [DISCONTINUED] fluticasone (Flonase) 50 mcg/actuation nasal spray Administer 1 spray into each nostril once daily as needed.      [DISCONTINUED] HumaLOG KwikPen Insulin 100 unit/mL injection Take 3 units twice daily with meals as directed per insulin instructions. 15 mL 1    [DISCONTINUED] HumaLOG KwikPen Insulin 100 unit/mL injection Take 3 units twice daily with meals as directed per insulin instructions. 15 mL 1    [DISCONTINUED] linaGLIPtin (Tradjenta) 5 mg tablet Take 1 tablet (5 mg) by mouth once daily. 90 tablet 3    [DISCONTINUED] lisinopril 10 mg tablet Take 1 tablet (10 mg) by mouth once daily. " 90 tablet 1    [DISCONTINUED] loratadine (Claritin) 10 mg tablet Take 1 tablet (10 mg) by mouth if needed.      [DISCONTINUED] polyethylene glycol (Miralax) 17 gram packet Take 17 g by mouth once daily as needed. MIX WITH 8 OZ FLUID       Current Facility-Administered Medications on File Prior to Visit   Medication Dose Route Frequency Provider Last Rate Last Admin    [DISCONTINUED] sodium chloride 0.9% infusion  20 mL intravenous  External Data Provider Generic         Immunization History   Administered Date(s) Administered    COVID-19, mRNA, LNP-S, PF, 30 mcg/0.3 mL dose 03/31/2021, 04/24/2021, 11/04/2021    DT (pediatric) 05/29/2007    Influenza, seasonal, injectable 10/29/2010, 11/25/2011    Pneumococcal conjugate vaccine, 13-valent (PREVNAR 13) 05/23/2018    Pneumococcal conjugate vaccine, 20-valent (PREVNAR 20) 07/12/2023    Pneumococcal polysaccharide vaccine, 23-valent, age 2 years and older (PNEUMOVAX 23) 10/29/2006    Tdap vaccine, age 7 year and older (BOOSTRIX, ADACEL) 05/23/2018     Patient's medical history was reviewed and updated either before or during this encounter.       Pablo Montoya MD

## 2025-02-17 NOTE — PATIENT INSTRUCTIONS
Lets go ahead and increase that amlodipine from 2.5 mg daily to 5 mg daily (just take 2 of the 2.5 mg pills each day.  I will plan on seeing you in about 2 weeks so we can see how that has worked for you.

## 2025-02-17 NOTE — PROGRESS NOTES
Patient received the following instructions for Maik 3 plus personal start:     Sensor application and start up of sensor; aware to change in 15 days. Products for better adhesion; skin tac and simpatch. Expected differences in sensor glucose and blood glucose; always check with meter if symptoms do not match sensor glucose of if magnify glass appears on display. Trend arrows. Juan Daniel functions: target ranges set to ; alerts set if applicable. Creating a [employer] account and connecting to practice. Bring reader to all office visits. Charge reader overnight. Remove for MRI, CAT scan and X-ray. Call [employer] ([phone]) for problems with sensor, they will replace.     Patient correctly applied sensor to back of upper arm and sensor session started. Patient will benefit from insight using Maik CGM and learning which meals / snacks cause high sugars. We will also be able to make necessary adjustments to the patient's insulin / medication regimen based on CGM data at next follow up.     Freestyle Maik 3 plus personal start and education provided by GERHARD Woods, PharmD, BCPS

## 2025-02-24 ENCOUNTER — APPOINTMENT (OUTPATIENT)
Dept: PRIMARY CARE | Facility: CLINIC | Age: 75
End: 2025-02-24
Payer: MEDICARE

## 2025-02-25 DIAGNOSIS — I10 PRIMARY HYPERTENSION: ICD-10-CM

## 2025-02-25 RX ORDER — AMLODIPINE BESYLATE 2.5 MG/1
5 TABLET ORAL DAILY
Qty: 60 TABLET | Refills: 5 | Status: SHIPPED | OUTPATIENT
Start: 2025-02-25 | End: 2025-08-24

## 2025-02-26 NOTE — PROGRESS NOTES
Attestation signed by Patrick Diane MD on 2/26/25 at 7:47 AM.    I, Dr Patrick Diane, have reviewed this progress note, medication list, vital signs, any pertinent lab values, and any CGM data if present with the [facility] and  face to face during this visit today. This note reflects the treatment plan that was made under my direction after reviewing the above mentioned elements while face to face with the patient and CDE.  I personally answered and addressed any questions and concerns the patient had during the visit today.  The CDE entered the data in this note under my direction and I personally reviewed it, signed any lab or medication orders that I instructed to be completed. I am the billing provider for this visit and the level of service was determined by my involvement in the Medical Decision Making Component of this visit while face to face with the patient.

## 2025-03-07 ENCOUNTER — OFFICE VISIT (OUTPATIENT)
Dept: PRIMARY CARE | Facility: CLINIC | Age: 75
End: 2025-03-07
Payer: MEDICARE

## 2025-03-07 VITALS
HEIGHT: 63 IN | HEART RATE: 88 BPM | BODY MASS INDEX: 36.32 KG/M2 | WEIGHT: 205 LBS | TEMPERATURE: 97.6 F | DIASTOLIC BLOOD PRESSURE: 82 MMHG | OXYGEN SATURATION: 97 % | SYSTOLIC BLOOD PRESSURE: 126 MMHG

## 2025-03-07 DIAGNOSIS — D50.9 IRON DEFICIENCY ANEMIA, UNSPECIFIED IRON DEFICIENCY ANEMIA TYPE: ICD-10-CM

## 2025-03-07 DIAGNOSIS — I10 PRIMARY HYPERTENSION: ICD-10-CM

## 2025-03-07 DIAGNOSIS — M19.91 PRIMARY OSTEOARTHRITIS, UNSPECIFIED SITE: ICD-10-CM

## 2025-03-07 DIAGNOSIS — K21.9 GASTROESOPHAGEAL REFLUX DISEASE WITHOUT ESOPHAGITIS: ICD-10-CM

## 2025-03-07 DIAGNOSIS — E55.9 VITAMIN D DEFICIENCY: ICD-10-CM

## 2025-03-07 DIAGNOSIS — E11.22 TYPE 2 DIABETES MELLITUS WITH STAGE 3B CHRONIC KIDNEY DISEASE, WITH LONG-TERM CURRENT USE OF INSULIN (MULTI): Primary | ICD-10-CM

## 2025-03-07 DIAGNOSIS — D64.9 ANEMIA, UNSPECIFIED TYPE: ICD-10-CM

## 2025-03-07 DIAGNOSIS — N18.32 TYPE 2 DIABETES MELLITUS WITH STAGE 3B CHRONIC KIDNEY DISEASE, WITH LONG-TERM CURRENT USE OF INSULIN (MULTI): Primary | ICD-10-CM

## 2025-03-07 DIAGNOSIS — Z79.4 TYPE 2 DIABETES MELLITUS WITH STAGE 3B CHRONIC KIDNEY DISEASE, WITH LONG-TERM CURRENT USE OF INSULIN (MULTI): Primary | ICD-10-CM

## 2025-03-07 PROCEDURE — 1160F RVW MEDS BY RX/DR IN RCRD: CPT | Performed by: INTERNAL MEDICINE

## 2025-03-07 PROCEDURE — 1036F TOBACCO NON-USER: CPT | Performed by: INTERNAL MEDICINE

## 2025-03-07 PROCEDURE — 99213 OFFICE O/P EST LOW 20 MIN: CPT | Performed by: INTERNAL MEDICINE

## 2025-03-07 PROCEDURE — 1125F AMNT PAIN NOTED PAIN PRSNT: CPT | Performed by: INTERNAL MEDICINE

## 2025-03-07 PROCEDURE — 1159F MED LIST DOCD IN RCRD: CPT | Performed by: INTERNAL MEDICINE

## 2025-03-07 PROCEDURE — 3079F DIAST BP 80-89 MM HG: CPT | Performed by: INTERNAL MEDICINE

## 2025-03-07 PROCEDURE — 3008F BODY MASS INDEX DOCD: CPT | Performed by: INTERNAL MEDICINE

## 2025-03-07 PROCEDURE — 3074F SYST BP LT 130 MM HG: CPT | Performed by: INTERNAL MEDICINE

## 2025-03-07 PROCEDURE — G2211 COMPLEX E/M VISIT ADD ON: HCPCS | Performed by: INTERNAL MEDICINE

## 2025-03-07 RX ORDER — CHLORTHALIDONE 25 MG/1
25 TABLET ORAL DAILY
Qty: 30 TABLET | Refills: 11 | Status: SHIPPED | OUTPATIENT
Start: 2025-03-07 | End: 2026-03-07

## 2025-03-07 RX ORDER — METHOCARBAMOL 500 MG/1
TABLET, FILM COATED ORAL
COMMUNITY
Start: 2025-02-28

## 2025-03-07 RX ORDER — CLOBETASOL PROPIONATE 0.5 MG/G
OINTMENT TOPICAL
COMMUNITY
Start: 2025-02-25

## 2025-03-07 ASSESSMENT — ENCOUNTER SYMPTOMS
OCCASIONAL FEELINGS OF UNSTEADINESS: 1
GASTROINTESTINAL NEGATIVE: 1
CARDIOVASCULAR NEGATIVE: 1
RESPIRATORY NEGATIVE: 1
DEPRESSION: 0
LOSS OF SENSATION IN FEET: 0

## 2025-03-07 ASSESSMENT — PAIN SCALES - GENERAL: PAINLEVEL_OUTOF10: 10-WORST PAIN EVER

## 2025-03-07 NOTE — PROGRESS NOTES
[facility]: [facility]  PROGRESS NOTE      Daniela pEstein is a 74 y.o. female that is presenting today for Follow-up.    Assessment/Plan   Diagnoses and all orders for this visit:  Type 2 diabetes mellitus with stage 3b chronic kidney disease, with long-term current use of insulin (Multi)  Primary hypertension  Vitamin D deficiency  Iron deficiency anemia, unspecified iron deficiency anemia type  Anemia, unspecified type  Primary osteoarthritis, unspecified site  Gastroesophageal reflux disease without esophagitis  We considered all information here.  We also rechecked her blood pressure and here 156/80.    1.  Hypertension-we are going to add chlorthalidone 25 mg a day to her current program and I will see her back in 1 month with another electrolyte panel prior to that visit.  Subjective   Is here for high blood pressure follow-up.  At her last visit her systolic pressure had been fairly high and we had added extra amlodipine-we pushed her dosage up to 5 mg daily.  She is tolerating this okay although she sometimes feels dizzy and sometimes has headaches (also seeing neurology for those symptoms).  At home she continues to monitor her blood pressures and she brings those in for our review.  Her blood pressures have been as high as 175 systolic and as low as 125.  She had a few values that she reported that were as high as 181/105.      Review of Systems   Respiratory: Negative.     Cardiovascular: Negative.    Gastrointestinal: Negative.       Objective   Vitals:    03/07/25 0915   BP: 126/82   Pulse: 88   Temp: 36.4 °C (97.6 °F)   SpO2: 97%      Body mass index is 36.31 kg/m².  Physical Exam  Cardiovascular:      Rate and Rhythm: Normal rate and regular rhythm.      Pulses: Normal pulses.      Heart sounds: Normal heart sounds.   Pulmonary:      Effort: Pulmonary effort is normal.      Breath sounds: Normal breath sounds.   Abdominal:      General: Abdomen is flat. Bowel sounds are  "normal.      Palpations: Abdomen is soft.   Musculoskeletal:      Right lower leg: Edema present.      Left lower leg: Edema present.      Comments: Trace edema       Diagnostic Results   Lab Results   Component Value Date    GLUCOSE 144 (H) 01/31/2025    CALCIUM 9.2 01/31/2025     01/31/2025    K 4.8 01/31/2025    CO2 26 01/31/2025     01/31/2025    BUN 26 (H) 01/31/2025    CREATININE 1.53 (H) 01/31/2025     Lab Results   Component Value Date    ALT 10 01/31/2025    AST 13 01/31/2025    GGT 18 09/03/2018    ALKPHOS 138 01/31/2025    BILITOT 0.5 01/31/2025     Lab Results   Component Value Date    WBC 8.7 01/31/2025    HGB 11.8 01/31/2025    HCT 37.9 01/31/2025    MCV 86.1 01/31/2025     01/31/2025     Lab Results   Component Value Date    CHOL 193 01/31/2025    CHOL 187 07/23/2024    CHOL 167 01/25/2024     Lab Results   Component Value Date    HDL 74 01/31/2025    HDL 73.0 07/23/2024    HDL 74.0 01/25/2024     Lab Results   Component Value Date    LDLCALC 96 01/31/2025    LDLCALC 88 07/23/2024    LDLCALC 68 01/25/2024     Lab Results   Component Value Date    TRIG 133 01/31/2025    TRIG 130 07/23/2024    TRIG 127 01/25/2024     No components found for: \"CHOLHDL\"  Lab Results   Component Value Date    HGBA1C 9.3 (H) 01/31/2025     Other labs not included in the list above were reviewed either before or during this encounter.    History    Past Medical History:   Diagnosis Date    Abdominal pain 06/07/2024    Acoustic neuroma (Multi) 08/30/2023    Acute lower urinary tract infection 08/30/2023    Acute otitis externa 06/07/2024    Acute renal failure (CMS-HCC) 03/27/2023    Acute urinary tract infection 03/27/2023    Affective psychosis, bipolar (Multi) 08/30/2023    Age-related nuclear cataract, left eye 10/16/2015    Age-related nuclear cataract of left eye    Age-related nuclear cataract, right eye 10/16/2015    Age-related nuclear cataract of right eye    Allergic     Anemia     Anxiety     " Anxiety disorder 08/30/2023    Arthritis     Bilateral dry eyes 08/30/2023    Bipolar disorder 08/30/2023    Breast lump 08/30/2023    Cardiac device in situ 08/30/2023    Cerebrovascular accident (CVA) (Multi) 06/07/2024    Cerebrovascular accident (Multi) 08/30/2023    Closed fracture of phalanx of great toe 02/08/2023    Constipation 02/17/2022    Dehydration 06/07/2024    Depression 08/30/2023    Depression, unspecified 05/20/2014    Depression    Diabetes mellitus (Multi) 08/30/2023    Diabetes mellitus, type 2 (Multi) 08/30/2023    Dry eye syndrome of unspecified lacrimal gland 06/25/2015    Dry eye syndrome    Dysfunction of right eustachian tube 08/30/2023    Dyslipidemia 08/30/2023    Dysphagia 08/30/2023    Essential (primary) hypertension 12/15/2013    Hypertension    Essential (primary) hypertension 08/30/2023    Falls 10/21/2024    Fibrocystic breast changes 08/30/2023    Fracture of bone adjacent to prosthesis 02/14/2022    Gastroesophageal reflux disease 08/30/2023    Hazy vision 08/30/2023    Hip pain 06/07/2024    History of cardiovascular surgery 02/13/2024    Hx of breast cancer 06/08/2016    Hypokalemia 08/30/2023    Infection due to Escherichia coli 03/27/2023    Knee stiffness 01/16/2023    Mastodynia 08/30/2023    Mental disorder 08/30/2023    Mixed hyperlipidemia 08/30/2023    Myopia, bilateral 10/16/2015    High myopia, both eyes    Nausea & vomiting 08/30/2023    Nose injury 08/30/2023    Obesity 03/27/2023    Oral mucosal lesion 08/30/2023    Osteoarthritis 08/30/2023    Other disorders affecting eyelid function 06/25/2015    Excessive involuntary blinking    Overactive bladder 08/30/2023    Pain in left hand 02/06/2023    Personal history of diseases of the blood and blood-forming organs and certain disorders involving the immune mechanism     History of anemia    Personal history of other diseases of the digestive system     History of esophageal reflux    Personal history of other  diseases of the nervous system and sense organs     History of cataract    Personal history of other diseases of urinary system     History of bladder problems    Personal history of other mental and behavioral disorders     History of mental disorder    Post-traumatic headache 02/13/2024    Pure hypercholesterolemia, unspecified 05/20/2014    High cholesterol    Rhinitis 08/30/2023    S/P knee replacement 08/30/2023    Severe myopia 05/07/2015    Snoring     Snoring    Solitary cyst of breast 08/30/2023    Stage 3b chronic kidney disease (Multi) 08/30/2023    Stenosis of lacrimal punctum 05/16/2019    Swelling, mass, or lump on face 08/30/2023    Syncope 08/30/2023    TMJ (temporomandibular joint syndrome) 08/30/2023    Type 2 diabetes mellitus without complications (Multi) 06/25/2015    Diabetes mellitus    Unspecified injury of left wrist, hand and finger(s), initial encounter 02/06/2023    Unsteady gait 08/30/2023    Vaginal irritation 05/15/2024    Vitamin D deficiency 08/30/2023    Weakness 03/27/2023     Past Surgical History:   Procedure Laterality Date    ANKLE SURGERY  1998    injury    APPENDECTOMY  1972    BLEPHAROPLASTY Bilateral 2010    BREAST BIOPSY  2011    BREAST LUMPECTOMY Right 2011    atypical hyperplasia    CARDIAC SURGERY  03/06/2023    implant cardiac event recorder for recurrent syncope/ Dr. Fowler    CARPAL TUNNEL RELEASE Left 2010    Neuroplasty Decompression Median Nerve At Carpal Tunnel    CARPAL TUNNEL RELEASE Right 2010    CHOLECYSTECTOMY  1979    COLONOSCOPY  2007    COLONOSCOPY  2013    COLONOSCOPY  2014    COLONOSCOPY  11/06/2020    COLONOSCOPY  06/2024    CRANIOTOMY Right 2006    acoustic neuroma    CT ANGIO NECK  03/22/2023    CT NECK [place] AND [place] [facility] [place]    [place] ANGIO W AND WO IV CONTRAST  03/22/2023    CT HEAD ANGIO W AND WO IV CONTRAST [facility] [place] [facility] [place]    DILATION AND CURETTAGE OF UTERUS  1971    DILATION AND CURETTAGE OF UTERUS  2013    ESOPHAGOGASTRODUODENOSCOPY  2007     ESOPHAGOGASTRODUODENOSCOPY  2010    ESOPHAGOGASTRODUODENOSCOPY  2020    EYE SURGERY      FRACTURE SURGERY      HEMORRHOID SURGERY  1990    JOINT REPLACEMENT      KNEE SURGERY Right 2008    scar tissue    MOUTH SURGERY Right 2017    right buccal lesion    MR HEAD ANGIO WO IV CONTRAST  01/18/2015    MR HEAD ANGIO WO IV CONTRAST [facility]    MR HEAD ANGIO WO IV CONTRAST  05/08/2016    MR HEAD ANGIO WO IV CONTRAST [facility]    MR HEAD ANGIO WO IV CONTRAST  02/28/2017    MR HEAD ANGIO WO IV CONTRAST [facility]    MR NECK ANGIO WO IV CONTRAST  01/18/2015    MR NECK ANGIO WO IV CONTRAST [facility]    NASAL SINUS SURGERY  1985    ORIF FEMUR FRACTURE Right 2022    OVARIAN CYST REMOVAL  1972    SEPTOPLASTY  05/20/2014    Septoplasty    TONSILLECTOMY      TOTAL KNEE ARTHROPLASTY Right 2007    TOTAL KNEE ARTHROPLASTY Left 2011    TUBAL LIGATION  1983    URETHRAL SLING  2013     Family History   Problem Relation Name Age of Onset    Arthritis Mother Paty     Breast cancer Mother Paty     Diabetes Mother Paty     Hypertension Mother Paty     Heart failure Father Luis     Heart disease Father Luis     Arthritis Other SIBLINGS     Diabetes Other SIBLINGS     Hypertension Other SIBLINGS      Social History     Socioeconomic History    Marital status:      Spouse name: Not on file    Number of children: Not on file    Years of education: Not on file    Highest education level: Not on file   Occupational History    Not on file   Tobacco Use    Smoking status: Never     Passive exposure: Never    Smokeless tobacco: Never   Vaping Use    Vaping status: Never Used   Substance and Sexual Activity    Alcohol use: Not Currently    Drug use: Never    Sexual activity: Defer   Other Topics Concern    Not on file   Social History Narrative    Not on file     Social Drivers of Health     Financial Resource Strain: Not on file   Food Insecurity: Not on file   Transportation Needs: Not on  file   Physical Activity: Not on file   Stress: Not on file   Social Connections: Not on file   Intimate Partner Violence: Not on file   Housing Stability: Not on file     Allergies   Allergen Reactions    Other Other     TARTICK DYSKENESIA    Phenothiazines Hives, Shortness of breath, Other and Unknown     Slurred speech    Erythromycin Hives, Unknown and Rash    Fish Oil Unknown    Haloperidol Unknown     Current Outpatient Medications on File Prior to Visit   Medication Sig Dispense Refill    amLODIPine (Norvasc) 2.5 mg tablet Take 2 tablets (5 mg) by mouth once daily. 60 tablet 5    apixaban (Eliquis) 5 mg tablet Take 1 tablet (5 mg) by mouth 2 times a day. 60 tablet 11    atorvastatin (Lipitor) 40 mg tablet Take 1 tablet (40 mg) by mouth once daily.      blood sugar diagnostic strip Inject under the skin once daily.      clobetasol (Temovate) 0.05 % ointment apply 1 application externally once a day      fluocinonide 0.05 % cream Apply topically to affected areas on the legs twice a day as needed flares      glipiZIDE (Glucotrol) 5 mg tablet Take 1 tablet (5 mg) by mouth 2 times a day. 2 tabs in the am, 1 tablet in the pm 270 tablet 3    imipramine (Tofranil) 25 mg tablet Take 1 tablet (25 mg) by mouth once daily at bedtime. 90 tablet 1    insulin aspart (NovoLOG Flexpen U-100 Insulin) 100 unit/mL (3 mL) pen Take 3 units twice daily with meals as directed per insulin instructions. 15 mL 1    insulin glargine (Basaglar KwikPen U-100 Insulin) 100 unit/mL (3 mL) pen Inject 12 Units under the skin once daily at bedtime. Take as directed per insulin instructions. 15 mL 3    lamoTRIgine (LaMICtal) 200 mg tablet Take 1 tablet (200 mg) by mouth once daily. ODT?      loperamide (Imodium A-D) 2 mg tablet Take 1 tablet (2 mg) by mouth 4 times a day as needed.      lubiprostone (Amitiza) 8 mcg capsule once daily with breakfast.      methocarbamol (Robaxin) 500 mg tablet take 1 tablet by mouth 3 times as needed for headache  "or neck pain (muscle relaxant)      omeprazole (PriLOSEC) 40 mg DR capsule Take 1 capsule (40 mg) by mouth once daily in the morning. Take before meals. Do not crush or chew. 90 capsule 3    pen needle, diabetic 32 gauge x 5/32\" needle Use daily with insulin injection. 100 each 1    psyllium (Metamucil) 3.4 gram packet Take 1 packet by mouth if needed.      sertraline (Zoloft) 50 mg tablet Take 1 tablet (50 mg) by mouth once daily.       No current facility-administered medications on file prior to visit.     Immunization History   Administered Date(s) Administered    COVID-19, mRNA, LNP-S, PF, 30 mcg/0.3 mL dose 03/31/2021, 04/24/2021, 11/04/2021    DT (pediatric) 05/29/2007    Influenza, seasonal, injectable 10/29/2010, 11/25/2011    Pneumococcal conjugate vaccine, 13-valent (PREVNAR 13) 05/23/2018    Pneumococcal conjugate vaccine, 20-valent (PREVNAR 20) 07/12/2023    Pneumococcal polysaccharide vaccine, 23-valent, age 2 years and older (PNEUMOVAX 23) 10/29/2006    Tdap vaccine, age 7 year and older (BOOSTRIX, ADACEL) 05/23/2018     Patient's medical history was reviewed and updated either before or during this encounter.       Pablo Montoya MD  "

## 2025-03-07 NOTE — PATIENT INSTRUCTIONS
It was like your blood pressure is doing a bit better since you have been on the amlodipine 5 mg total daily.  I want you to stay on that dose.  I am going to add chlorthalidone 25 mg to her program have you take that tablet once a day I sent that into your pharmacy.    I want to see you in a month and I did order a blood test to do a few days prior to that visit.

## 2025-03-10 ENCOUNTER — HOSPITAL ENCOUNTER (OUTPATIENT)
Dept: CARDIOLOGY | Facility: CLINIC | Age: 75
Discharge: HOME | End: 2025-03-10
Payer: MEDICARE

## 2025-03-10 DIAGNOSIS — R55 SYNCOPE, UNSPECIFIED SYNCOPE TYPE: ICD-10-CM

## 2025-03-10 DIAGNOSIS — Z95.9 CARDIAC DEVICE IN SITU: ICD-10-CM

## 2025-03-10 PROCEDURE — 93298 REM INTERROG DEV EVAL SCRMS: CPT

## 2025-03-18 RX ORDER — DEUTETRABENAZINE 6 MG/1
TABLET, COATED ORAL
COMMUNITY
Start: 2025-03-11

## 2025-03-18 RX ORDER — GABAPENTIN 300 MG/1
300 CAPSULE ORAL
COMMUNITY
Start: 2025-03-13

## 2025-03-18 RX ORDER — DEUTETRABENAZINE 12 MG/1
TABLET, COATED ORAL
COMMUNITY
Start: 2025-03-11

## 2025-03-18 RX ORDER — CHLORZOXAZONE 500 MG/1
TABLET ORAL
COMMUNITY
Start: 2025-03-13

## 2025-03-19 ENCOUNTER — HOSPITAL ENCOUNTER (OUTPATIENT)
Dept: RADIOLOGY | Facility: CLINIC | Age: 75
Discharge: HOME | End: 2025-03-19
Payer: MEDICARE

## 2025-03-19 ENCOUNTER — APPOINTMENT (OUTPATIENT)
Dept: RADIOLOGY | Facility: CLINIC | Age: 75
End: 2025-03-19
Payer: MEDICARE

## 2025-03-19 ENCOUNTER — OFFICE VISIT (OUTPATIENT)
Dept: PRIMARY CARE | Facility: CLINIC | Age: 75
End: 2025-03-19
Payer: MEDICARE

## 2025-03-19 VITALS
DIASTOLIC BLOOD PRESSURE: 73 MMHG | OXYGEN SATURATION: 99 % | TEMPERATURE: 97.8 F | HEIGHT: 63 IN | HEART RATE: 85 BPM | SYSTOLIC BLOOD PRESSURE: 125 MMHG | WEIGHT: 208 LBS | BODY MASS INDEX: 36.86 KG/M2

## 2025-03-19 DIAGNOSIS — E11.9 TYPE 2 DIABETES MELLITUS WITHOUT COMPLICATION, WITH LONG-TERM CURRENT USE OF INSULIN (MULTI): ICD-10-CM

## 2025-03-19 DIAGNOSIS — R60.0 EDEMA OF RIGHT LOWER EXTREMITY: ICD-10-CM

## 2025-03-19 DIAGNOSIS — M25.471 RIGHT ANKLE SWELLING: Primary | ICD-10-CM

## 2025-03-19 DIAGNOSIS — M25.471 RIGHT ANKLE SWELLING: ICD-10-CM

## 2025-03-19 DIAGNOSIS — Z79.4 TYPE 2 DIABETES MELLITUS WITHOUT COMPLICATION, WITH LONG-TERM CURRENT USE OF INSULIN (MULTI): ICD-10-CM

## 2025-03-19 PROCEDURE — 3078F DIAST BP <80 MM HG: CPT | Performed by: NURSE PRACTITIONER

## 2025-03-19 PROCEDURE — 99214 OFFICE O/P EST MOD 30 MIN: CPT | Performed by: NURSE PRACTITIONER

## 2025-03-19 PROCEDURE — 73610 X-RAY EXAM OF ANKLE: CPT | Mod: RT

## 2025-03-19 PROCEDURE — 93971 EXTREMITY STUDY: CPT

## 2025-03-19 PROCEDURE — 1125F AMNT PAIN NOTED PAIN PRSNT: CPT | Performed by: NURSE PRACTITIONER

## 2025-03-19 PROCEDURE — 3008F BODY MASS INDEX DOCD: CPT | Performed by: NURSE PRACTITIONER

## 2025-03-19 PROCEDURE — 1159F MED LIST DOCD IN RCRD: CPT | Performed by: NURSE PRACTITIONER

## 2025-03-19 PROCEDURE — 3074F SYST BP LT 130 MM HG: CPT | Performed by: NURSE PRACTITIONER

## 2025-03-19 RX ORDER — GLIPIZIDE 5 MG/1
10 TABLET ORAL 2 TIMES DAILY
Qty: 360 TABLET | Refills: 3 | Status: SHIPPED | OUTPATIENT
Start: 2025-03-19

## 2025-03-19 RX ORDER — GLIPIZIDE 5 MG/1
5 TABLET ORAL 2 TIMES DAILY
Qty: 180 TABLET | Refills: 3 | Status: SHIPPED | OUTPATIENT
Start: 2025-03-19 | End: 2025-03-19 | Stop reason: SDUPTHER

## 2025-03-19 ASSESSMENT — ENCOUNTER SYMPTOMS
GASTROINTESTINAL NEGATIVE: 1
PSYCHIATRIC NEGATIVE: 1
CONSTITUTIONAL NEGATIVE: 1
RESPIRATORY NEGATIVE: 1

## 2025-03-19 ASSESSMENT — PAIN SCALES - GENERAL: PAINLEVEL_OUTOF10: 6

## 2025-03-19 NOTE — ASSESSMENT & PLAN NOTE
Will notify patient of x ray results  Elevate ankle  Tylenol or ice for comfort  Orders:    XR ankle right 2 views; Future

## 2025-03-26 ENCOUNTER — TELEPHONE (OUTPATIENT)
Dept: PRIMARY CARE | Facility: CLINIC | Age: 75
End: 2025-03-26
Payer: MEDICARE

## 2025-03-27 DIAGNOSIS — I10 PRIMARY HYPERTENSION: Primary | ICD-10-CM

## 2025-03-27 LAB
ALBUMIN SERPL-MCNC: 3.7 G/DL (ref 3.6–5.1)
ALP SERPL-CCNC: 158 U/L (ref 37–153)
ALT SERPL-CCNC: 13 U/L (ref 6–29)
ANION GAP SERPL CALCULATED.4IONS-SCNC: 11 MMOL/L (CALC) (ref 7–17)
ANION GAP SERPL CALCULATED.4IONS-SCNC: 11 MMOL/L (CALC) (ref 7–17)
AST SERPL-CCNC: 12 U/L (ref 10–35)
BASOPHILS # BLD AUTO: 98 CELLS/UL (ref 0–200)
BASOPHILS NFR BLD AUTO: 1.4 %
BILIRUB SERPL-MCNC: 0.4 MG/DL (ref 0.2–1.2)
BUN SERPL-MCNC: 34 MG/DL (ref 7–25)
BUN SERPL-MCNC: 34 MG/DL (ref 7–25)
BUN/CREAT SERPL: 21 (CALC) (ref 6–22)
CALCIUM SERPL-MCNC: 9 MG/DL (ref 8.6–10.4)
CALCIUM SERPL-MCNC: 9 MG/DL (ref 8.6–10.4)
CHLORIDE SERPL-SCNC: 102 MMOL/L (ref 98–110)
CHLORIDE SERPL-SCNC: 102 MMOL/L (ref 98–110)
CO2 SERPL-SCNC: 27 MMOL/L (ref 20–32)
CO2 SERPL-SCNC: 27 MMOL/L (ref 20–32)
CREAT SERPL-MCNC: 1.61 MG/DL (ref 0.6–1)
CREAT SERPL-MCNC: 1.61 MG/DL (ref 0.6–1)
EGFRCR SERPLBLD CKD-EPI 2021: 33 ML/MIN/1.73M2
EGFRCR SERPLBLD CKD-EPI 2021: 33 ML/MIN/1.73M2
EOSINOPHIL # BLD AUTO: 329 CELLS/UL (ref 15–500)
EOSINOPHIL NFR BLD AUTO: 4.7 %
ERYTHROCYTE [DISTWIDTH] IN BLOOD BY AUTOMATED COUNT: 13.3 % (ref 11–15)
EST. AVERAGE GLUCOSE BLD GHB EST-MCNC: 220 MG/DL
EST. AVERAGE GLUCOSE BLD GHB EST-SCNC: 12.2 MMOL/L
GLUCOSE SERPL-MCNC: 149 MG/DL (ref 65–99)
GLUCOSE SERPL-MCNC: 149 MG/DL (ref 65–99)
HBA1C MFR BLD: 9.3 % OF TOTAL HGB
HCT VFR BLD AUTO: 34.5 % (ref 35–45)
HGB BLD-MCNC: 11.1 G/DL (ref 11.7–15.5)
LYMPHOCYTES # BLD AUTO: 1673 CELLS/UL (ref 850–3900)
LYMPHOCYTES NFR BLD AUTO: 23.9 %
MCH RBC QN AUTO: 26.8 PG (ref 27–33)
MCHC RBC AUTO-ENTMCNC: 32.2 G/DL (ref 32–36)
MCV RBC AUTO: 83.3 FL (ref 80–100)
MONOCYTES # BLD AUTO: 455 CELLS/UL (ref 200–950)
MONOCYTES NFR BLD AUTO: 6.5 %
NEUTROPHILS # BLD AUTO: 4445 CELLS/UL (ref 1500–7800)
NEUTROPHILS NFR BLD AUTO: 63.5 %
PLATELET # BLD AUTO: 307 THOUSAND/UL (ref 140–400)
PMV BLD REES-ECKER: 10.1 FL (ref 7.5–12.5)
POTASSIUM SERPL-SCNC: 4.3 MMOL/L (ref 3.5–5.3)
POTASSIUM SERPL-SCNC: 4.3 MMOL/L (ref 3.5–5.3)
PROT SERPL-MCNC: 7 G/DL (ref 6.1–8.1)
RBC # BLD AUTO: 4.14 MILLION/UL (ref 3.8–5.1)
SODIUM SERPL-SCNC: 140 MMOL/L (ref 135–146)
SODIUM SERPL-SCNC: 140 MMOL/L (ref 135–146)
WBC # BLD AUTO: 7 THOUSAND/UL (ref 3.8–10.8)

## 2025-03-27 RX ORDER — FUROSEMIDE 20 MG/1
20 TABLET ORAL DAILY
Qty: 30 TABLET | Refills: 11 | Status: SHIPPED | OUTPATIENT
Start: 2025-03-27 | End: 2026-03-27

## 2025-03-27 NOTE — TELEPHONE ENCOUNTER
Patient advised that issue could be addressed at follow up. Patient states that Dr. Wiseman advised that she should be started on water pill before she begins PT.

## 2025-03-27 NOTE — TELEPHONE ENCOUNTER
Patient states that she has been taking this medication but it has not been helping the swelling in her leg.

## 2025-04-01 DIAGNOSIS — I10 PRIMARY HYPERTENSION: ICD-10-CM

## 2025-04-07 ENCOUNTER — OFFICE VISIT (OUTPATIENT)
Dept: PRIMARY CARE | Facility: CLINIC | Age: 75
End: 2025-04-07
Payer: MEDICARE

## 2025-04-07 VITALS
SYSTOLIC BLOOD PRESSURE: 134 MMHG | HEIGHT: 63 IN | TEMPERATURE: 97.4 F | HEART RATE: 89 BPM | BODY MASS INDEX: 37.39 KG/M2 | WEIGHT: 211 LBS | OXYGEN SATURATION: 97 % | DIASTOLIC BLOOD PRESSURE: 70 MMHG

## 2025-04-07 DIAGNOSIS — N18.32 STAGE 3B CHRONIC KIDNEY DISEASE (MULTI): ICD-10-CM

## 2025-04-07 DIAGNOSIS — K21.9 GASTROESOPHAGEAL REFLUX DISEASE WITHOUT ESOPHAGITIS: ICD-10-CM

## 2025-04-07 DIAGNOSIS — G63 NEUROPATHY ASSOCIATED WITH ENDOCRINE DISORDER: ICD-10-CM

## 2025-04-07 DIAGNOSIS — N32.81 OAB (OVERACTIVE BLADDER): ICD-10-CM

## 2025-04-07 DIAGNOSIS — D50.9 IRON DEFICIENCY ANEMIA, UNSPECIFIED IRON DEFICIENCY ANEMIA TYPE: ICD-10-CM

## 2025-04-07 DIAGNOSIS — E11.22 TYPE 2 DIABETES MELLITUS WITH STAGE 3B CHRONIC KIDNEY DISEASE, WITH LONG-TERM CURRENT USE OF INSULIN (MULTI): ICD-10-CM

## 2025-04-07 DIAGNOSIS — E34.9 NEUROPATHY ASSOCIATED WITH ENDOCRINE DISORDER: ICD-10-CM

## 2025-04-07 DIAGNOSIS — Z79.4 TYPE 2 DIABETES MELLITUS WITH STAGE 3B CHRONIC KIDNEY DISEASE, WITH LONG-TERM CURRENT USE OF INSULIN (MULTI): ICD-10-CM

## 2025-04-07 DIAGNOSIS — N18.4 STAGE 4 CHRONIC KIDNEY DISEASE (MULTI): ICD-10-CM

## 2025-04-07 DIAGNOSIS — E53.8 VITAMIN B12 DEFICIENCY: ICD-10-CM

## 2025-04-07 DIAGNOSIS — E11.42 DIABETIC PERIPHERAL NEUROPATHY (MULTI): ICD-10-CM

## 2025-04-07 DIAGNOSIS — D64.9 ANEMIA, UNSPECIFIED TYPE: ICD-10-CM

## 2025-04-07 DIAGNOSIS — N18.32 TYPE 2 DIABETES MELLITUS WITH STAGE 3B CHRONIC KIDNEY DISEASE, WITH LONG-TERM CURRENT USE OF INSULIN (MULTI): ICD-10-CM

## 2025-04-07 DIAGNOSIS — N32.81 OVERACTIVE BLADDER: ICD-10-CM

## 2025-04-07 DIAGNOSIS — E55.9 VITAMIN D DEFICIENCY: ICD-10-CM

## 2025-04-07 DIAGNOSIS — M19.91 PRIMARY OSTEOARTHRITIS, UNSPECIFIED SITE: ICD-10-CM

## 2025-04-07 DIAGNOSIS — I10 PRIMARY HYPERTENSION: Primary | ICD-10-CM

## 2025-04-07 DIAGNOSIS — I48.91 ATRIAL FIBRILLATION, UNSPECIFIED TYPE (MULTI): ICD-10-CM

## 2025-04-07 PROCEDURE — 3008F BODY MASS INDEX DOCD: CPT | Performed by: INTERNAL MEDICINE

## 2025-04-07 PROCEDURE — 3078F DIAST BP <80 MM HG: CPT | Performed by: INTERNAL MEDICINE

## 2025-04-07 PROCEDURE — 1159F MED LIST DOCD IN RCRD: CPT | Performed by: INTERNAL MEDICINE

## 2025-04-07 PROCEDURE — RXMED WILLOW AMBULATORY MEDICATION CHARGE

## 2025-04-07 PROCEDURE — 1160F RVW MEDS BY RX/DR IN RCRD: CPT | Performed by: INTERNAL MEDICINE

## 2025-04-07 PROCEDURE — 99214 OFFICE O/P EST MOD 30 MIN: CPT | Performed by: INTERNAL MEDICINE

## 2025-04-07 PROCEDURE — 1125F AMNT PAIN NOTED PAIN PRSNT: CPT | Performed by: INTERNAL MEDICINE

## 2025-04-07 PROCEDURE — 3075F SYST BP GE 130 - 139MM HG: CPT | Performed by: INTERNAL MEDICINE

## 2025-04-07 RX ORDER — FUROSEMIDE 20 MG/1
40 TABLET ORAL DAILY
Qty: 60 TABLET | Refills: 2 | Status: SHIPPED | OUTPATIENT
Start: 2025-04-07 | End: 2025-07-06

## 2025-04-07 RX ORDER — FUROSEMIDE 20 MG/1
40 TABLET ORAL DAILY
Qty: 60 TABLET | Refills: 11 | Status: SHIPPED | OUTPATIENT
Start: 2025-04-07 | End: 2025-04-07 | Stop reason: SDUPTHER

## 2025-04-07 ASSESSMENT — PAIN SCALES - GENERAL: PAINLEVEL_OUTOF10: 3

## 2025-04-07 ASSESSMENT — ENCOUNTER SYMPTOMS
RESPIRATORY NEGATIVE: 1
CARDIOVASCULAR NEGATIVE: 1
OCCASIONAL FEELINGS OF UNSTEADINESS: 1
GASTROINTESTINAL NEGATIVE: 1
DEPRESSION: 0
LOSS OF SENSATION IN FEET: 0

## 2025-04-07 NOTE — PROGRESS NOTES
[facility]: [facility]  PROGRESS NOTE      Daniela Epstein is a 74 y.o. female that is presenting today for Follow-up.    Assessment/Plan   Diagnoses and all orders for this visit:  Primary hypertension  -     Referral to Clinical Pharmacy; Future  -     furosemide (Lasix) 20 mg tablet; Take 2 tablets (40 mg) by mouth once daily.  -     Referral to Nephrology; Future  -     Basic Metabolic Panel; Future  Vitamin D deficiency  Type 2 diabetes mellitus with stage 3b chronic kidney disease, with long-term current use of insulin (Multi)  Iron deficiency anemia, unspecified iron deficiency anemia type  Gastroesophageal reflux disease without esophagitis  Primary osteoarthritis, unspecified site  Anemia, unspecified type  Neuropathy associated with endocrine disorder  Diabetic peripheral neuropathy (Multi)  Stage 3b chronic kidney disease (Multi)  Vitamin B12 deficiency  Stage 4 chronic kidney disease (Multi)  Atrial fibrillation, unspecified type (Multi)  -     Referral to Cardiac Electrophysiology; Future  OAB (overactive bladder)  Overactive bladder  -     Referral to Urology; Future  Other orders  -     Follow Up In Primary Care - Established; Future  We reviewed the current situation.  She has significant limitations on what blood pressure medicine she can take because of her cardiac situation and because of her chronic kidney disease.  Furthermore I think even 2.5 mg of amlodipine has really exacerbated her problems with edema.  She is going to stop the amlodipine we will increase the furosemide to 40 mg a day and I will see her back in a month.  I really need to have help from nephrology with her blood pressure control and I am going to send her there again.    We also noticed on her blood work that was recently done that her sugars are very poorly controlled with a hemoglobin A1c of 9.3%.  She really needs to work through Dr. Rodriguez's group to get that sugars under better  control.  Subjective   Sees me for follow-up care when I seen this 74-year-old last month I placed her on some chlorthalidone for blood pressure control and I thought this might help with some of the edema that she was having.  She says that was of no help to her legs are just getting bigger and therefore I stopped the chlorthalidone and replace it with furosemide which she does not feel has helped her blood pressure.  She still complains that her legs are swollen most of the time.    In addition to this in reviewing her medications she says she does not have Eliquis that she has not been on this really for quite a while.  Review of the chart shows that she was placed on that by Dr. Patrick practice because of his atrial fibrillation for which she has a device now      Review of Systems   Respiratory: Negative.     Cardiovascular: Negative.    Gastrointestinal: Negative.    Genitourinary: Negative.       Objective   Vitals:    04/07/25 1620   BP: 134/70   Pulse: 89   Temp: 36.3 °C (97.4 °F)   SpO2: 97%      Body mass index is 37.38 kg/m².  Physical Exam  Diagnostic Results   Lab Results   Component Value Date    GLUCOSE 149 (H) 03/26/2025    GLUCOSE 149 (H) 03/26/2025    CALCIUM 9.0 03/26/2025    CALCIUM 9.0 03/26/2025     03/26/2025     03/26/2025    K 4.3 03/26/2025    K 4.3 03/26/2025    CO2 27 03/26/2025    CO2 27 03/26/2025     03/26/2025     03/26/2025    BUN 34 (H) 03/26/2025    BUN 34 (H) 03/26/2025    CREATININE 1.61 (H) 03/26/2025    CREATININE 1.61 (H) 03/26/2025     Lab Results   Component Value Date    ALT 13 03/26/2025    AST 12 03/26/2025    GGT 18 09/03/2018    ALKPHOS 158 (H) 03/26/2025    BILITOT 0.4 03/26/2025     Lab Results   Component Value Date    WBC 7.0 03/26/2025    HGB 11.1 (L) 03/26/2025    HCT 34.5 (L) 03/26/2025    MCV 83.3 03/26/2025     03/26/2025     Lab Results   Component Value Date    CHOL 193 01/31/2025    CHOL 187 07/23/2024    CHOL 167  "01/25/2024     Lab Results   Component Value Date    HDL 74 01/31/2025    HDL 73.0 07/23/2024    HDL 74.0 01/25/2024     Lab Results   Component Value Date    LDLCALC 96 01/31/2025    LDLCALC 88 07/23/2024    LDLCALC 68 01/25/2024     Lab Results   Component Value Date    TRIG 133 01/31/2025    TRIG 130 07/23/2024    TRIG 127 01/25/2024     No components found for: \"CHOLHDL\"  Lab Results   Component Value Date    HGBA1C 9.3 (H) 03/26/2025     Other labs not included in the list above were reviewed either before or during this encounter.    History    Past Medical History:   Diagnosis Date    Abdominal pain 06/07/2024    Acoustic neuroma (Multi) 08/30/2023    Acute lower urinary tract infection 08/30/2023    Acute otitis externa 06/07/2024    Acute renal failure 03/27/2023    Acute urinary tract infection 03/27/2023    Affective psychosis, bipolar (Multi) 08/30/2023    Age-related nuclear cataract, left eye 10/16/2015    Age-related nuclear cataract of left eye    Age-related nuclear cataract, right eye 10/16/2015    Age-related nuclear cataract of right eye    Allergic     Anemia     Anxiety     Anxiety disorder 08/30/2023    Arthritis     Bilateral dry eyes 08/30/2023    Bipolar disorder 08/30/2023    Breast lump 08/30/2023    Cardiac device in situ 08/30/2023    Cerebrovascular accident (CVA) (Multi) 06/07/2024    Cerebrovascular accident (Multi) 08/30/2023    Closed fracture of phalanx of great toe 02/08/2023    Constipation 02/17/2022    Dehydration 06/07/2024    Depression 08/30/2023    Depression, unspecified 05/20/2014    Depression    Diabetes mellitus (Multi) 08/30/2023    Diabetes mellitus, type 2 (Multi) 08/30/2023    Dry eye syndrome of unspecified lacrimal gland 06/25/2015    Dry eye syndrome    Dysfunction of right eustachian tube 08/30/2023    Dyslipidemia 08/30/2023    Dysphagia 08/30/2023    Essential (primary) hypertension 12/15/2013    Hypertension    Essential (primary) hypertension 08/30/2023    " Falls 10/21/2024    Fibrocystic breast changes 08/30/2023    Fracture of bone adjacent to prosthesis 02/14/2022    Gastroesophageal reflux disease 08/30/2023    Hazy vision 08/30/2023    Hip pain 06/07/2024    History of cardiovascular surgery 02/13/2024    Hx of breast cancer 06/08/2016    Hypokalemia 08/30/2023    Infection due to Escherichia coli 03/27/2023    Knee stiffness 01/16/2023    Mastodynia 08/30/2023    Mental disorder 08/30/2023    Mixed hyperlipidemia 08/30/2023    Myopia, bilateral 10/16/2015    High myopia, both eyes    Nausea & vomiting 08/30/2023    Nose injury 08/30/2023    Obesity 03/27/2023    Oral mucosal lesion 08/30/2023    Osteoarthritis 08/30/2023    Other disorders affecting eyelid function 06/25/2015    Excessive involuntary blinking    Overactive bladder 08/30/2023    Pain in left hand 02/06/2023    Personal history of diseases of the blood and blood-forming organs and certain disorders involving the immune mechanism     History of anemia    Personal history of other diseases of the digestive system     History of esophageal reflux    Personal history of other diseases of the nervous system and sense organs     History of cataract    Personal history of other diseases of urinary system     History of bladder problems    Personal history of other mental and behavioral disorders     History of mental disorder    Post-traumatic headache 02/13/2024    Pure hypercholesterolemia, unspecified 05/20/2014    High cholesterol    Rhinitis 08/30/2023    S/P knee replacement 08/30/2023    Severe myopia 05/07/2015    Snoring     Snoring    Solitary cyst of breast 08/30/2023    Stage 3b chronic kidney disease (Multi) 08/30/2023    Stenosis of lacrimal punctum 05/16/2019    Swelling, mass, or lump on face 08/30/2023    Syncope 08/30/2023    TMJ (temporomandibular joint syndrome) 08/30/2023    Type 2 diabetes mellitus without complications 06/25/2015    Diabetes mellitus    Unspecified injury of left  wrist, hand and finger(s), initial encounter 02/06/2023    Unsteady gait 08/30/2023    Vaginal irritation 05/15/2024    Vitamin D deficiency 08/30/2023    Weakness 03/27/2023     Past Surgical History:   Procedure Laterality Date    ANKLE SURGERY  1998    injury    APPENDECTOMY  1972    BLEPHAROPLASTY Bilateral 2010    BREAST BIOPSY  2011    BREAST LUMPECTOMY Right 2011    atypical hyperplasia    CARDIAC SURGERY  03/06/2023    implant cardiac event recorder for recurrent syncope/ Dr. Fowler    CARPAL TUNNEL RELEASE Left 2010    Neuroplasty Decompression Median Nerve At Carpal Tunnel    CARPAL TUNNEL RELEASE Right 2010    CHOLECYSTECTOMY  1979    COLONOSCOPY  2007    COLONOSCOPY  2013    COLONOSCOPY  2014    COLONOSCOPY  11/06/2020    COLONOSCOPY  06/2024    CRANIOTOMY Right 2006    acoustic neuroma    CT ANGIO NECK  03/22/2023    CT N[place] [facility] [place]    [place] ANGIO W AND WO IV CONTRAST  03/22/2023    CT HEAD ANG[place] AND [place] [facility] [place]    DILATION AND CURETTAGE OF UTERUS  1971    DILATION AND CURETTAGE OF UTERUS  2013    ESOPHAGOGASTRODUODENOSCOPY  2007    ESOPHAGOGASTRODUODENOSCOPY  2010    ESOPHAGOGASTRODUODENOSCOPY  2020    EYE SURGERY      FRACTURE SURGERY      HEMORRHOID SURGERY  1990    JOINT REPLACEMENT      KNEE SURGERY Right 2008    scar tissue    MOUTH SURGERY Right 2017    right buccal lesion    MR HEAD ANGIO WO IV CONTRAST  01/18/2015    MR HEAD ANGIO WO IV CONTRAST [facility]    MR HEAD ANGIO WO IV CONTRAST  05/08/2016    MR HEAD ANGIO WO IV CONTRAST [facility]    MR HEAD ANGIO WO IV CONTRAST  02/28/2017    MR HEAD ANGIO WO IV CONTRAST [facility]    MR NECK ANGIO WO IV CONTRAST  01/18/2015    MR NECK ANGIO WO IV CONTRAST [facility]    NASAL SINUS SURGERY  1985    ORIF FEMUR FRACTURE Right 2022    OVARIAN CYST REMOVAL  1972    SEPTOPLASTY  05/20/2014    Septoplasty    TONSILLECTOMY      TOTAL KNEE ARTHROPLASTY Right 2007    TOTAL KNEE  ARTHROPLASTY Left 2011    TUBAL LIGATION  1983    URETHRAL SLING  2013     Family History   Problem Relation Name Age of Onset    Arthritis Mother Paty     Breast cancer Mother Paty     Diabetes Mother Paty     Hypertension Mother Paty     Heart failure Father Luis     Heart disease Father Luis     Arthritis Other SIBLINGS     Diabetes Other SIBLINGS     Hypertension Other SIBLINGS      Social History     Socioeconomic History    Marital status:      Spouse name: Not on file    Number of children: Not on file    Years of education: Not on file    Highest education level: Not on file   Occupational History    Not on file   Tobacco Use    Smoking status: Never     Passive exposure: Never    Smokeless tobacco: Never   Vaping Use    Vaping status: Never Used   Substance and Sexual Activity    Alcohol use: Not Currently    Drug use: Never    Sexual activity: Defer   Other Topics Concern    Not on file   Social History Narrative    Not on file     Social Drivers of Health     Financial Resource Strain: Not on file   Food Insecurity: Not on file   Transportation Needs: Not on file   Physical Activity: Not on file   Stress: Not on file   Social Connections: Not on file   Intimate Partner Violence: Not on file   Housing Stability: Not on file     Allergies   Allergen Reactions    Other Other     TARTICK DYSKENESIA    Phenothiazines Hives, Shortness of breath, Other and Unknown     Slurred speech    Erythromycin Hives, Unknown and Rash    Cyclosporine Other     burning    Fish Oil Unknown    Haloperidol Unknown     Current Outpatient Medications on File Prior to Visit   Medication Sig Dispense Refill    atorvastatin (Lipitor) 40 mg tablet Take 1 tablet (40 mg) by mouth once daily.      Austedo 12 mg tablet TAKE ONE TABLET BY MOUTH ONCE DAILY WITH 6 MG TABLET.      Austedo 6 mg tablet TAKE ONE TABLET BY MOUTH ONCE DAILY WITH 12 MG TABLET.      blood sugar diagnostic strip Inject under the skin once daily.       "chlorzoxazone (Parafon Forte) 500 mg tablet take 1 tablet by mouth three times a day as needed for muscle spasm (headache or neck pain)      clobetasol (Temovate) 0.05 % ointment apply 1 application externally once a day      fluocinonide 0.05 % cream Apply topically to affected areas on the legs twice a day as needed flares      gabapentin (Neurontin) 300 mg capsule Take 1 capsule (300 mg) by mouth. AT BEDTIME      glipiZIDE (Glucotrol) 5 mg tablet Take 2 tablets (10 mg) by mouth 2 times a day. 2 tabs in the am, 2 tablet in the pm 360 tablet 3    insulin aspart (NovoLOG Flexpen U-100 Insulin) 100 unit/mL (3 mL) pen Take 3 units twice daily with meals as directed per insulin instructions. 15 mL 1    insulin glargine (Basaglar KwikPen U-100 Insulin) 100 unit/mL (3 mL) pen Inject 12 Units under the skin once daily at bedtime. Take as directed per insulin instructions. 15 mL 3    lamoTRIgine (LaMICtal) 200 mg tablet Take 1 tablet (200 mg) by mouth once daily. ODT?      loperamide (Imodium A-D) 2 mg tablet Take 1 tablet (2 mg) by mouth 4 times a day as needed.      lubiprostone (Amitiza) 8 mcg capsule once daily with breakfast.      omeprazole (PriLOSEC) 40 mg DR capsule Take 1 capsule (40 mg) by mouth once daily in the morning. Take before meals. Do not crush or chew. 90 capsule 3    pen needle, diabetic 32 gauge x 5/32\" needle Use daily with insulin injection. 100 each 1    psyllium (Metamucil) 3.4 gram packet Take 1 packet by mouth if needed.      sertraline (Zoloft) 50 mg tablet Take 1 tablet (50 mg) by mouth once daily.      [DISCONTINUED] amLODIPine (Norvasc) 2.5 mg tablet Take 2 tablets (5 mg) by mouth once daily. 60 tablet 5    [DISCONTINUED] furosemide (Lasix) 20 mg tablet Take 1 tablet (20 mg) by mouth once daily. 30 tablet 11    apixaban (Eliquis) 5 mg tablet Take 1 tablet (5 mg) by mouth 2 times a day. (Patient not taking: Reported on 4/7/2025) 60 tablet 11    [DISCONTINUED] imipramine (Tofranil) 25 mg " tablet Take 1 tablet (25 mg) by mouth once daily at bedtime. (Patient not taking: Reported on 4/7/2025) 90 tablet 1    [DISCONTINUED] methocarbamol (Robaxin) 500 mg tablet take 1 tablet by mouth 3 times as needed for headache or neck pain (muscle relaxant) (Patient not taking: Reported on 4/7/2025)       No current facility-administered medications on file prior to visit.     Immunization History   Administered Date(s) Administered    COVID-19, mRNA, LNP-S, PF, 30 mcg/0.3 mL dose 03/31/2021, 04/24/2021, 11/04/2021    DT (pediatric) 05/29/2007    Influenza, seasonal, injectable 10/29/2010, 11/25/2011    Pneumococcal conjugate vaccine, 13-valent (PREVNAR 13) 05/23/2018    Pneumococcal conjugate vaccine, 20-valent (PREVNAR 20) 07/12/2023    Pneumococcal polysaccharide vaccine, 23-valent, age 2 years and older (PNEUMOVAX 23) 10/29/2006    Tdap vaccine, age 7 year and older (BOOSTRIX, ADACEL) 05/23/2018     Patient's medical history was reviewed and updated either before or during this encounter.       Pablo Montoya MD

## 2025-04-07 NOTE — PATIENT INSTRUCTIONS
We seem to be still struggling with the control of your blood pressure.  You also have a lot of swelling still in your legs.  I want you to go ahead and increase the furosemide to 40 mg a day and stop the amlodipine (which I think is making your legs swell.    I want you to go and see the nephrologist regarding your blood pressure and your chronic kidney disease    I want you to go see Dr. Patrick regarding your atrial fibrillation and to get a opinion about the apixaban/Eliquis which I know you have not been recently taking.    I will plan on seeing you back next month

## 2025-04-08 ENCOUNTER — PHARMACY VISIT (OUTPATIENT)
Dept: PHARMACY | Facility: CLINIC | Age: 75
End: 2025-04-08
Payer: COMMERCIAL

## 2025-04-09 ENCOUNTER — APPOINTMENT (OUTPATIENT)
Dept: PRIMARY CARE | Facility: CLINIC | Age: 75
End: 2025-04-09
Payer: MEDICARE

## 2025-04-11 ENCOUNTER — TELEMEDICINE (OUTPATIENT)
Dept: PHARMACY | Facility: HOSPITAL | Age: 75
End: 2025-04-11
Payer: MEDICARE

## 2025-04-11 DIAGNOSIS — I10 PRIMARY HYPERTENSION: ICD-10-CM

## 2025-04-11 NOTE — PROGRESS NOTES
Patient is sent at the request of Pablo Montoya MD for my opinion regarding Type 2 diabetes.  My final recommendations will be communicated back to the requesting provider by way of shared medical record.    Subjective   HPI    Current diet: well balanced  Current exercise: none      Allergies   Allergen Reactions    Other Other     TARTICK DYSKENESIA    Phenothiazines Hives, Shortness of breath, Other and Unknown     Slurred speech    Erythromycin Hives, Unknown and Rash    Cyclosporine Other     burning    Fish Oil Unknown    Haloperidol Unknown       Current monitoring regimen:   Patient is using: continuous glucose monitor  Maik 3   Uses reader  Fills through [employer]  -often forgets to take with her when she leaves the house  -is considering connecting to phone, discussed she needs to download the maik 3 tiana   -discussed that she either needs to use her phone or the tiana and cannot use both, will discuss with endo at next visit    Any episodes of hypoglycemia? no  Discussed signs and symptoms of hypoglycemia (blood sugar <70)  Discussed how to treat (half a can of regular soda, half a glass of orange juice, 4 glucose tabs, etc)    Adverse Effects:   No adverse effects noted    Objective     There were no vitals taken for this visit.    Diabetes Pharmacotherapy:    Basaglar 12 units subQ at bedtime  Novolog 3 units subQ before breakfast and dinner  -reports she misses almost every day   Glipizide 10 mg twice daily   -discussed the importance of taking before meals to prevent low blood sugars, patient stated understanding     Secondary Prevention:   - Statin? Yes  - ACE-I/ARB? No  - Aspirin? No      Lab Review  Lab Results   Component Value Date    BILITOT 0.4 03/26/2025    CALCIUM 9.0 03/26/2025    CALCIUM 9.0 03/26/2025    CO2 27 03/26/2025    CO2 27 03/26/2025     03/26/2025     03/26/2025    CREATININE 1.61 (H) 03/26/2025    CREATININE 1.61 (H) 03/26/2025    GLUCOSE 149 (H)  03/26/2025    GLUCOSE 149 (H) 03/26/2025    ALKPHOS 158 (H) 03/26/2025    K 4.3 03/26/2025    K 4.3 03/26/2025    PROT 7.0 03/26/2025     03/26/2025     03/26/2025    AST 12 03/26/2025    ALT 13 03/26/2025    BUN 34 (H) 03/26/2025    BUN 34 (H) 03/26/2025    ANIONGAP 11 03/26/2025    ANIONGAP 11 03/26/2025    MG 1.82 03/24/2023    PHOS 4.3 06/26/2024    GGT 18 09/03/2018    ALBUMIN 3.7 03/26/2025    LIPASE 11 (L) 11/27/2019    GFRF 41 (A) 03/24/2023     Lab Results   Component Value Date    TRIG 133 01/31/2025    CHOL 193 01/31/2025    LDLCALC 96 01/31/2025    HDL 74 01/31/2025     Lab Results   Component Value Date    HGBA1C 9.3 (H) 03/26/2025    HGBA1C 9.3 (H) 01/31/2025    HGBA1C 8.3 (H) 10/14/2024     The ASCVD Risk score (Megan PRIDE, et al., 2019) failed to calculate for the following reasons:    Risk score cannot be calculated because patient has a medical history suggesting prior/existing ASCVD      Assessment/Plan   Problem List Items Addressed This Visit       Primary hypertension       Patients diabetes is poorly controlled with most recent A1c of 9.3% (3/25/25) (Goal < 7%). Patient is currently being managed by endocrinology and has a follow up in 2 months.  Compliance at present is estimated to be fair. Efforts to improve compliance (if necessary) will be directed at dietary modifications: discussed decreasing rice, pasta, bread, potatoes, sweets. Discussed that fruit still has sugar in it even though it is healthy. Talked about using the latha to see how much foods are increasing sugars and making adjustments based off of the readings, patient was agreeable . Also discussed the importance of taking the insulins as prescribed, patient will start taking meal time insulin (was forgetting doses) will keep at kitchen table where she eats most of her meals.     PCP Follow-Up: 1 month  Endo follow up: 2 months    Amber Woods, PharmD, BCPS    Continue all meds under the continuation of care with  the referring provider and clinical pharmacy team.

## 2025-04-14 ENCOUNTER — ANCILLARY PROCEDURE (OUTPATIENT)
Facility: CLINIC | Age: 75
End: 2025-04-14
Payer: MEDICARE

## 2025-04-14 DIAGNOSIS — R55 SYNCOPE AND COLLAPSE: ICD-10-CM

## 2025-04-14 DIAGNOSIS — Z95.9 CARDIAC DEVICE IN SITU: Primary | ICD-10-CM

## 2025-04-14 DIAGNOSIS — Z95.9 CARDIAC DEVICE IN SITU: ICD-10-CM

## 2025-04-14 DIAGNOSIS — R55 SYNCOPE, UNSPECIFIED SYNCOPE TYPE: ICD-10-CM

## 2025-04-14 PROCEDURE — 93298 REM INTERROG DEV EVAL SCRMS: CPT

## 2025-04-14 PROCEDURE — 93298 REM INTERROG DEV EVAL SCRMS: CPT | Performed by: INTERNAL MEDICINE

## 2025-04-21 DIAGNOSIS — I10 PRIMARY HYPERTENSION: ICD-10-CM

## 2025-04-28 LAB
ANION GAP SERPL CALCULATED.4IONS-SCNC: 9 MMOL/L (CALC) (ref 7–17)
BUN SERPL-MCNC: 25 MG/DL (ref 7–25)
BUN/CREAT SERPL: 18 (CALC) (ref 6–22)
CALCIUM SERPL-MCNC: 9.1 MG/DL (ref 8.6–10.4)
CHLORIDE SERPL-SCNC: 102 MMOL/L (ref 98–110)
CO2 SERPL-SCNC: 29 MMOL/L (ref 20–32)
CREAT SERPL-MCNC: 1.42 MG/DL (ref 0.6–1)
EGFRCR SERPLBLD CKD-EPI 2021: 39 ML/MIN/1.73M2
GLUCOSE SERPL-MCNC: 140 MG/DL (ref 65–139)
POTASSIUM SERPL-SCNC: 3.7 MMOL/L (ref 3.5–5.3)
SODIUM SERPL-SCNC: 140 MMOL/L (ref 135–146)

## 2025-04-29 DIAGNOSIS — R73.9 HYPERGLYCEMIA: ICD-10-CM

## 2025-04-29 DIAGNOSIS — I10 PRIMARY HYPERTENSION: ICD-10-CM

## 2025-05-05 NOTE — PROGRESS NOTES
No chief complaint on file.       HPI   History of Present Illness  Daniela Epstein is a 74 year old female with a history of stroke and recurrent syncope who presents for coordination of care regarding her loop recorder and medication management. She was referred by Dr. Huber for coordination of care.    She has a history of stroke and recurrent syncope, for which a loop recorder was implanted two years ago. No recent episodes of atrial fibrillation have been recorded by the device. During a recent physical therapy session, her heart rate fluctuated between fast and slow, although she did not experience any symptoms at the time. She was performing balancing exercises. No new medications or changes in her current regimen have occurred since that episode.    She has a history of high blood pressure and diabetes. Her blood pressure readings at home have been consistently high, often above 150 mmHg systolic. She checks her blood pressure daily and is currently on a diuretic, furosemide, but does not recall taking any specific antihypertensive medication recently.    She experiences swelling, which has been present for the last two months. She takes Lasix daily and tries to keep her feet elevated most of the time. She is not currently on Eliquis and does not recall being on it recently.       Active Ambulatory Problems     Diagnosis Date Noted    Anemia 08/30/2023    Anxiety disorder 08/30/2023    Dry eyes 10/16/2015    Affective psychosis, bipolar (Multi) 08/30/2023    Bipolar affective disorder, currently active (Multi) 06/08/2016    Depression 08/30/2023    Fibrocystic breast changes 08/30/2023    Cardiac device in situ 08/30/2023    Transient ischemic attack 08/30/2023    Type 2 diabetes mellitus, without long-term current use of insulin (Multi) 02/15/2022    Diabetes mellitus (Multi) 08/30/2023    Clouded consciousness 08/30/2023    Dysfunction of eustachian tube 08/30/2023    Dyslipidemia 08/30/2023    Right  ear pain 08/30/2023    Primary hypertension 06/08/2016    Fall 08/30/2023    GERD (gastroesophageal reflux disease) 06/08/2016    Diplopia 08/30/2023    Other hyperlipidemia 02/15/2022    Mixed hyperlipidemia 08/30/2023    Hypokalemia 08/30/2023    Injury of head 08/30/2023    Iron deficiency anemia 08/30/2023    Neuropathy associated with endocrine disorder 08/30/2023    Overactive bladder 08/30/2023    Lens replaced 06/22/2016    Syncope 08/30/2023    Osteoarthritis 08/30/2023    Temporomandibular joint disorder 08/30/2023    Multifactorial gait disorder 08/30/2023    Vitamin D deficiency 08/30/2023    Disorder of cardiac function 08/30/2023    Asymmetrical sensorineural hearing loss 02/02/2024    Acute kidney failure, unspecified 03/27/2023    Abnormal computed tomography scan 02/13/2024    Tear film insufficiency 05/07/2015    Subdural hemorrhage (Multi) 02/13/2024    Snoring 02/13/2024    Punctal stenosis, acquired, bilateral 05/16/2019    Pseudophakia, both eyes 06/28/2019    Posterior vitreous detachment of both eyes 05/12/2016    Periprosthetic fracture around internal prosthetic right knee joint 02/14/2022    Other constipation 02/17/2022    Obesity with body mass index 30 or greater 03/27/2023    Nuclear senile cataract 05/07/2015    Internal hemorrhoids 07/02/2015    Chronic kidney disease due to hypertension 02/15/2022    History of mental disorder 02/13/2024    History of cataract 02/13/2024    History of anemia 02/13/2024    Excessive involuntary blinking 05/07/2015    Diabetic peripheral neuropathy (Multi) 02/13/2024    Contact with and (suspected) exposure to covid-19 02/06/2023    Cerebellar infarction (Multi) 07/17/2023    Blood glucose abnormal 02/13/2024    Anisometropia 06/15/2016    Unspecified Escherichia coli (E. coli) as the cause of diseases classified elsewhere 03/27/2023    Type 2 diabetes mellitus with diabetic chronic kidney disease 03/27/2023    Allergy status to other antibiotic  agents 03/27/2023    Contusion of anus 02/13/2024    Displaced fracture of proximal phalanx of left great toe, initial encounter for closed fracture 02/08/2023    Family history of ischemic heart disease and other diseases of the circulatory system 03/06/2023    Fracture of unspecified parts of lumbosacral spine and pelvis, initial encounter for closed fracture (Multi) 02/08/2023    Long term (current) use of oral hypoglycemic drugs 03/06/2023    Obesity, morbid (Multi) 03/27/2023    Otalgia 02/13/2024    Stiffness of right knee, not elsewhere classified 01/16/2023    Presence of cardiac and vascular implant and graft, unspecified 02/13/2024    Post-traumatic headache, unspecified, not intractable 02/13/2024    Edema of right lower extremity 03/19/2025    Right ankle swelling 03/19/2025     Resolved Ambulatory Problems     Diagnosis Date Noted    Acoustic neuroma (Multi) 08/30/2023    Acute lower urinary tract infection 08/30/2023    Acute otitis externa 08/30/2023    Mental disorder 08/30/2023    Breast lump 08/30/2023    Cardiomyopathy 08/30/2023    Cerebrovascular accident (Multi) 08/30/2023    Dehydration 08/30/2023    Dizziness 08/30/2023    Lightheadedness 08/30/2023    Dysphagia 08/30/2023    Abdominal pain 08/30/2023    Hip pain 08/30/2023    Unspecified injury of left wrist, hand and finger(s), initial encounter 02/06/2023    Injury of knee 08/30/2023    Mastodynia 08/30/2023    Nausea & vomiting 08/30/2023    Oral mucosal lesion 08/30/2023    Nose injury 08/30/2023    Rhinitis 08/30/2023    Solitary cyst of breast 08/30/2023    Swelling, mass, or lump on face 08/30/2023    Weakness 03/27/2023    Traumatic subdural hemorrhage with loss of consciousness status unknown, initial encounter (Multi) 03/23/2023    Traumatic brain compression without herniation, initial encounter (Multi) 03/27/2023    Hx of breast cancer 06/08/2016    Subdural hemorrhage, postinjury (Multi) 02/13/2024    Pain in left hand  02/06/2023    Pain in left toe(s) 02/06/2023    Pain in right knee 01/16/2023    Stage 3b chronic kidney disease (Multi) 08/30/2023    Vaginal irritation 05/15/2024    Acute renal failure 03/27/2023    Closed fracture of phalanx of great toe 02/08/2023    Obesity 03/27/2023    Constipation 02/17/2022    Fracture of bone adjacent to prosthesis 02/14/2022    Post-traumatic headache 02/13/2024    History of cardiovascular surgery 02/13/2024    Stenosis of lacrimal punctum 05/16/2019    Knee stiffness 01/16/2023    Infection due to Escherichia coli 03/27/2023    Severe myopia 05/07/2015    Abdominal pain 06/07/2024    Hip pain 06/07/2024    Acute urinary tract infection 03/27/2023    Acute otitis externa 06/07/2024    Cerebrovascular accident (CVA) (Multi) 06/07/2024    Dehydration 06/07/2024    Falls 10/21/2024     Past Medical History:   Diagnosis Date    Age-related nuclear cataract, left eye 10/16/2015    Age-related nuclear cataract, right eye 10/16/2015    Allergic     Anxiety     Arthritis     Bilateral dry eyes 08/30/2023    Bipolar disorder 08/30/2023    Depression, unspecified 05/20/2014    Diabetes mellitus, type 2 (Multi) 08/30/2023    Dry eye syndrome of unspecified lacrimal gland 06/25/2015    Dysfunction of right eustachian tube 08/30/2023    Essential (primary) hypertension 12/15/2013    Essential (primary) hypertension 08/30/2023    Gastroesophageal reflux disease 08/30/2023    Hazy vision 08/30/2023    Myopia, bilateral 10/16/2015    Other disorders affecting eyelid function 06/25/2015    Personal history of diseases of the blood and blood-forming organs and certain disorders involving the immune mechanism     Personal history of other diseases of the digestive system     Personal history of other diseases of the nervous system and sense organs     Personal history of other diseases of urinary system     Personal history of other mental and behavioral disorders     Pure hypercholesterolemia,  "unspecified 05/20/2014    S/P knee replacement 08/30/2023    TMJ (temporomandibular joint syndrome) 08/30/2023    Type 2 diabetes mellitus without complications 06/25/2015    Unsteady gait 08/30/2023        Review of Systems   All other systems reviewed and are negative.         Current Outpatient Medications   Medication Instructions    apixaban (ELIQUIS) 5 mg, oral, 2 times daily    atorvastatin (LIPITOR) 40 mg, oral, Daily    Austedo 12 mg tablet TAKE ONE TABLET BY MOUTH ONCE DAILY WITH 6 MG TABLET.    Austedo 6 mg tablet TAKE ONE TABLET BY MOUTH ONCE DAILY WITH 12 MG TABLET.    Basaglar KwikPen U-100 Insulin 12 Units, subcutaneous, Nightly, Take as directed per insulin instructions.    blood sugar diagnostic strip Daily    chlorzoxazone (Parafon Forte) 500 mg tablet take 1 tablet by mouth three times a day as needed for muscle spasm (headache or neck pain)    clobetasol (Temovate) 0.05 % ointment apply 1 application externally once a day    fluocinonide 0.05 % cream Apply topically to affected areas on the legs twice a day as needed flares    furosemide (LASIX) 40 mg, oral, Daily    gabapentin (NEURONTIN) 300 mg    glipiZIDE (GLUCOTROL) 10 mg, oral, 2 times daily, 2 tabs in the am, 2 tablet in the pm    insulin aspart (NovoLOG Flexpen U-100 Insulin) 100 unit/mL (3 mL) pen Take 3 units twice daily with meals as directed per insulin instructions.    lamoTRIgine (LaMICtal) 200 mg tablet 1 tablet, Daily    loperamide (Imodium A-D) 2 mg tablet 1 tablet, 4 times daily PRN    lubiprostone (Amitiza) 8 mcg capsule once daily with breakfast.    omeprazole (PRILOSEC) 40 mg, oral, Daily before breakfast, Do not crush or chew.    pen needle, diabetic 32 gauge x 5/32\" needle Use daily with insulin injection.    psyllium (Metamucil) 3.4 gram packet 1 packet, As needed    sertraline (Zoloft) 50 mg tablet 1 tablet, Daily       Objective     There were no vitals filed for this visit.     Vitals and nursing note reviewed. "   Constitutional:       Appearance: Healthy appearance. Obese.   HENT:    Mouth/Throat:      Pharynx: Oropharynx is clear.   Pulmonary:      Effort: Pulmonary effort is normal.      Breath sounds: Normal breath sounds.   Cardiovascular:      PMI at left midclavicular line. Normal rate. Regular rhythm. Normal S1. Normal S2.       Murmurs: There is a grade 1/6 holosystolic murmur.      No gallop.  No click. No rub.   Pulses:     Intact distal pulses.   Edema:     Thigh: bilateral 1+ edema of the thigh.     Pretibial: bilateral 1+ edema of the pretibial area.     Ankle: bilateral 1+ edema of the ankle.     Feet: bilateral 1+ edema of the feet.  Abdominal:      General: Bowel sounds are normal.   Musculoskeletal:      Cervical back: Normal range of motion. Skin:     General: Skin is warm and dry.   Neurological:      General: No focal deficit present.      Mental Status: Alert and oriented to person, place and time.            Lab Review:   Orders Only on 04/21/2025   Component Date Value    GLUCOSE 04/28/2025 140 (H)     UREA NITROGEN (BUN) 04/28/2025 25     CREATININE 04/28/2025 1.42 (H)     EGFR 04/28/2025 39 (L)     BUN/CREATININE RATIO 04/28/2025 18     SODIUM 04/28/2025 140     POTASSIUM 04/28/2025 3.7     CHLORIDE 04/28/2025 102     CARBON DIOXIDE 04/28/2025 29     ELECTROLYTE BALANCE 04/28/2025 9     CALCIUM 04/28/2025 9.1    Orders Only on 04/01/2025   Component Date Value    GLUCOSE 03/26/2025 149 (H)     UREA NITROGEN (BUN) 03/26/2025 34 (H)     CREATININE 03/26/2025 1.61 (H)     EGFR 03/26/2025 33 (L)     BUN/CREATININE RATIO 03/26/2025 21     SODIUM 03/26/2025 140     POTASSIUM 03/26/2025 4.3     CHLORIDE 03/26/2025 102     CARBON DIOXIDE 03/26/2025 27     ELECTROLYTE BALANCE 03/26/2025 11     CALCIUM 03/26/2025 9.0    Office Visit on 03/07/2025   Component Date Value    GLUCOSE 03/26/2025 149 (H)     UREA NITROGEN (BUN) 03/26/2025 34 (H)     CREATININE 03/26/2025 1.61 (H)     EGFR 03/26/2025 33 (L)      SODIUM 03/26/2025 140     POTASSIUM 03/26/2025 4.3     CHLORIDE 03/26/2025 102     CARBON DIOXIDE 03/26/2025 27     ELECTROLYTE BALANCE 03/26/2025 11     CALCIUM 03/26/2025 9.0     PROTEIN, TOTAL 03/26/2025 7.0     ALBUMIN 03/26/2025 3.7     BILIRUBIN, TOTAL 03/26/2025 0.4     ALKALINE PHOSPHATASE 03/26/2025 158 (H)     AST 03/26/2025 12     ALT 03/26/2025 13     WHITE BLOOD CELL COUNT 03/26/2025 7.0     RED BLOOD CELL COUNT 03/26/2025 4.14     HEMOGLOBIN 03/26/2025 11.1 (L)     HEMATOCRIT 03/26/2025 34.5 (L)     MCV 03/26/2025 83.3     MCH 03/26/2025 26.8 (L)     MCHC 03/26/2025 32.2     RDW 03/26/2025 13.3     PLATELET COUNT 03/26/2025 307     MPV 03/26/2025 10.1     ABSOLUTE NEUTROPHILS 03/26/2025 4,445     ABSOLUTE LYMPHOCYTES 03/26/2025 1,673     ABSOLUTE MONOCYTES 03/26/2025 455     ABSOLUTE EOSINOPHILS 03/26/2025 329     ABSOLUTE BASOPHILS 03/26/2025 98     NEUTROPHILS 03/26/2025 63.5     LYMPHOCYTES 03/26/2025 23.9     MONOCYTES 03/26/2025 6.5     EOSINOPHILS 03/26/2025 4.7     BASOPHILS 03/26/2025 1.4     HEMOGLOBIN A1c 03/26/2025 9.3 (H)     eAG (mg/dL) 03/26/2025 220     eAG (mmol/L) 03/26/2025 12.2        [id number] Score  Age 65-74: 1   Sex Female: 1   CHF History No: 0   HTN Yes: 1   Stroke/TIA/Thromboembolism Yes: 2   Vascular Dz: CAD/PAD/Aortic Plaque No: 0   DM Yes: 1   Total Score 6        ECG:  Normal sinus rhythm at 72 bpm CA interval 100 ms QRS duration 100 ms QTc 475 ms  Results  DIAGNOSTIC  Loop recorder: No atrial fibrillation (04/2025)     Assessment/plan:  Assessment & Plan  Hypertension  Hypertension with systolic blood pressure consistently above 150 mmHg. She reports headaches and elevated blood pressure readings at home. Current regimen includes only a diuretic without other antihypertensive agents.  - Review laboratory work to determine appropriate antihypertensive medication  - Prescribe antihypertensive medication ( Lisinopril)    Peripheral edema  Peripheral edema with swelling  over the last two months. She is on furosemide (Lasix) which may alleviate swelling. Compression socks and leg elevation recommended. Potential cardiac cause for edema considered, possibly related to heart function or hypertension.  - Order echocardiogram to assess cardiac function  - Review blood work to evaluate potential causes of edema  - Advise wearing compression socks and keeping feet elevated    Stroke  Stroke with no current anticoagulation therapy. No atrial fibrillation documented on loop recorder, which was implanted due to recurrent syncope.    Diabetes  Diabetes contributing to cardiovascular risk factors. No specific discussion of current management or control in this encounter.    Follow-up  Coordination of care with multiple appointments scheduled. Remote transmission of loop recorder data due next week or the week after.  - Ensure follow-up with device clinic for loop recorder data transmission  - Coordinate care with other healthcare providers as needed       Problem List Items Addressed This Visit    None     Song Patrick MD   This medical note was created with the assistance of artificial intelligence (AI) for documentation purposes. The content has been reviewed and confirmed by the healthcare provider for accuracy and completeness. Patient consented to the use of audio recording and use of AI during their visit.

## 2025-05-06 ENCOUNTER — OFFICE VISIT (OUTPATIENT)
Facility: CLINIC | Age: 75
End: 2025-05-06
Payer: MEDICARE

## 2025-05-06 VITALS
BODY MASS INDEX: 35.61 KG/M2 | DIASTOLIC BLOOD PRESSURE: 78 MMHG | WEIGHT: 201 LBS | HEART RATE: 72 BPM | SYSTOLIC BLOOD PRESSURE: 136 MMHG

## 2025-05-06 DIAGNOSIS — I48.91 ATRIAL FIBRILLATION, UNSPECIFIED TYPE (MULTI): ICD-10-CM

## 2025-05-06 DIAGNOSIS — Z95.9 CARDIAC DEVICE IN SITU: Primary | ICD-10-CM

## 2025-05-06 DIAGNOSIS — I10 PRIMARY HYPERTENSION: ICD-10-CM

## 2025-05-06 DIAGNOSIS — R55 SYNCOPE AND COLLAPSE: ICD-10-CM

## 2025-05-06 PROCEDURE — 93005 ELECTROCARDIOGRAM TRACING: CPT | Performed by: INTERNAL MEDICINE

## 2025-05-06 PROCEDURE — G2211 COMPLEX E/M VISIT ADD ON: HCPCS | Performed by: INTERNAL MEDICINE

## 2025-05-06 PROCEDURE — 4010F ACE/ARB THERAPY RXD/TAKEN: CPT | Performed by: INTERNAL MEDICINE

## 2025-05-06 PROCEDURE — 1159F MED LIST DOCD IN RCRD: CPT | Performed by: INTERNAL MEDICINE

## 2025-05-06 PROCEDURE — 3075F SYST BP GE 130 - 139MM HG: CPT | Performed by: INTERNAL MEDICINE

## 2025-05-06 PROCEDURE — 99213 OFFICE O/P EST LOW 20 MIN: CPT | Performed by: INTERNAL MEDICINE

## 2025-05-06 PROCEDURE — 1126F AMNT PAIN NOTED NONE PRSNT: CPT | Performed by: INTERNAL MEDICINE

## 2025-05-06 PROCEDURE — 93010 ELECTROCARDIOGRAM REPORT: CPT | Performed by: INTERNAL MEDICINE

## 2025-05-06 PROCEDURE — 3078F DIAST BP <80 MM HG: CPT | Performed by: INTERNAL MEDICINE

## 2025-05-06 PROCEDURE — 99213 OFFICE O/P EST LOW 20 MIN: CPT | Mod: 25 | Performed by: INTERNAL MEDICINE

## 2025-05-06 PROCEDURE — 1036F TOBACCO NON-USER: CPT | Performed by: INTERNAL MEDICINE

## 2025-05-06 RX ORDER — LISINOPRIL 5 MG/1
5 TABLET ORAL DAILY
Qty: 30 TABLET | Refills: 11 | Status: SHIPPED | OUTPATIENT
Start: 2025-05-06 | End: 2026-05-06

## 2025-05-06 ASSESSMENT — PAIN SCALES - GENERAL: PAINLEVEL_OUTOF10: 0-NO PAIN

## 2025-05-06 NOTE — ASSESSMENT & PLAN NOTE
Hypertension with systolic blood pressure consistently above 150 mmHg. She reports headaches and elevated blood pressure readings at home. Current regimen includes only a diuretic without other antihypertensive agents.  - Review laboratory work to determine appropriate antihypertensive medication  - Prescribe antihypertensive medication ( Lisinopril)

## 2025-05-07 ENCOUNTER — APPOINTMENT (OUTPATIENT)
Dept: AUDIOLOGY | Facility: CLINIC | Age: 75
End: 2025-05-07

## 2025-05-07 ENCOUNTER — TELEPHONE (OUTPATIENT)
Facility: CLINIC | Age: 75
End: 2025-05-07

## 2025-05-07 DIAGNOSIS — H90.3 SENSORINEURAL HEARING LOSS, ASYMMETRICAL: ICD-10-CM

## 2025-05-07 PROCEDURE — V5014 HEARING AID REPAIR/MODIFYING: HCPCS | Performed by: AUDIOLOGIST

## 2025-05-09 ENCOUNTER — OFFICE VISIT (OUTPATIENT)
Dept: PRIMARY CARE | Facility: CLINIC | Age: 75
End: 2025-05-09
Payer: MEDICARE

## 2025-05-09 ENCOUNTER — TELEPHONE (OUTPATIENT)
Facility: CLINIC | Age: 75
End: 2025-05-09

## 2025-05-09 VITALS
WEIGHT: 215 LBS | OXYGEN SATURATION: 97 % | SYSTOLIC BLOOD PRESSURE: 124 MMHG | DIASTOLIC BLOOD PRESSURE: 82 MMHG | TEMPERATURE: 97.1 F | HEIGHT: 63 IN | HEART RATE: 69 BPM | BODY MASS INDEX: 38.09 KG/M2

## 2025-05-09 DIAGNOSIS — I10 PRIMARY HYPERTENSION: ICD-10-CM

## 2025-05-09 DIAGNOSIS — E11.22 TYPE 2 DIABETES MELLITUS WITH STAGE 3B CHRONIC KIDNEY DISEASE, WITH LONG-TERM CURRENT USE OF INSULIN (MULTI): ICD-10-CM

## 2025-05-09 DIAGNOSIS — N18.32 STAGE 3B CHRONIC KIDNEY DISEASE (MULTI): ICD-10-CM

## 2025-05-09 DIAGNOSIS — R73.9 HYPERGLYCEMIA: ICD-10-CM

## 2025-05-09 DIAGNOSIS — D50.9 IRON DEFICIENCY ANEMIA, UNSPECIFIED IRON DEFICIENCY ANEMIA TYPE: ICD-10-CM

## 2025-05-09 DIAGNOSIS — E78.2 MIXED HYPERLIPIDEMIA: ICD-10-CM

## 2025-05-09 DIAGNOSIS — K21.9 GASTROESOPHAGEAL REFLUX DISEASE WITHOUT ESOPHAGITIS: ICD-10-CM

## 2025-05-09 DIAGNOSIS — E55.9 VITAMIN D DEFICIENCY: ICD-10-CM

## 2025-05-09 DIAGNOSIS — E53.8 VITAMIN B12 DEFICIENCY: ICD-10-CM

## 2025-05-09 DIAGNOSIS — Z00.00 ANNUAL PHYSICAL EXAM: Primary | ICD-10-CM

## 2025-05-09 DIAGNOSIS — N18.32 TYPE 2 DIABETES MELLITUS WITH STAGE 3B CHRONIC KIDNEY DISEASE, WITH LONG-TERM CURRENT USE OF INSULIN (MULTI): ICD-10-CM

## 2025-05-09 DIAGNOSIS — D64.9 ANEMIA, UNSPECIFIED TYPE: ICD-10-CM

## 2025-05-09 DIAGNOSIS — M19.91 PRIMARY OSTEOARTHRITIS, UNSPECIFIED SITE: ICD-10-CM

## 2025-05-09 DIAGNOSIS — Z79.4 TYPE 2 DIABETES MELLITUS WITH STAGE 3B CHRONIC KIDNEY DISEASE, WITH LONG-TERM CURRENT USE OF INSULIN (MULTI): ICD-10-CM

## 2025-05-09 DIAGNOSIS — F31.9 BIPOLAR AFFECTIVE DISORDER, REMISSION STATUS UNSPECIFIED (MULTI): ICD-10-CM

## 2025-05-09 DIAGNOSIS — E11.42 DIABETIC PERIPHERAL NEUROPATHY (MULTI): ICD-10-CM

## 2025-05-09 PROCEDURE — 1159F MED LIST DOCD IN RCRD: CPT | Performed by: INTERNAL MEDICINE

## 2025-05-09 PROCEDURE — G0439 PPPS, SUBSEQ VISIT: HCPCS | Performed by: INTERNAL MEDICINE

## 2025-05-09 PROCEDURE — 4010F ACE/ARB THERAPY RXD/TAKEN: CPT | Performed by: INTERNAL MEDICINE

## 2025-05-09 PROCEDURE — 99213 OFFICE O/P EST LOW 20 MIN: CPT | Performed by: INTERNAL MEDICINE

## 2025-05-09 PROCEDURE — 1123F ACP DISCUSS/DSCN MKR DOCD: CPT | Performed by: INTERNAL MEDICINE

## 2025-05-09 PROCEDURE — G8433 SCR FOR DEP NOT CPT DOC RSN: HCPCS | Performed by: INTERNAL MEDICINE

## 2025-05-09 PROCEDURE — 3079F DIAST BP 80-89 MM HG: CPT | Performed by: INTERNAL MEDICINE

## 2025-05-09 PROCEDURE — 3008F BODY MASS INDEX DOCD: CPT | Performed by: INTERNAL MEDICINE

## 2025-05-09 PROCEDURE — 1160F RVW MEDS BY RX/DR IN RCRD: CPT | Performed by: INTERNAL MEDICINE

## 2025-05-09 PROCEDURE — 99213 OFFICE O/P EST LOW 20 MIN: CPT | Mod: 25 | Performed by: INTERNAL MEDICINE

## 2025-05-09 PROCEDURE — 3074F SYST BP LT 130 MM HG: CPT | Performed by: INTERNAL MEDICINE

## 2025-05-09 PROCEDURE — 1158F ADVNC CARE PLAN TLK DOCD: CPT | Performed by: INTERNAL MEDICINE

## 2025-05-09 PROCEDURE — 1126F AMNT PAIN NOTED NONE PRSNT: CPT | Performed by: INTERNAL MEDICINE

## 2025-05-09 PROCEDURE — 99215 OFFICE O/P EST HI 40 MIN: CPT | Performed by: INTERNAL MEDICINE

## 2025-05-09 RX ORDER — OXYBUTYNIN CHLORIDE 10 MG/1
10 TABLET, EXTENDED RELEASE ORAL DAILY
COMMUNITY
Start: 2025-05-08

## 2025-05-09 RX ORDER — BACITRACIN ZINC AND POLYMYXIN B SULFATE 500; 10000 [USP'U]/G; [USP'U]/G
1 OINTMENT OPHTHALMIC AS NEEDED
COMMUNITY
Start: 2025-04-25

## 2025-05-09 ASSESSMENT — PAIN SCALES - GENERAL: PAINLEVEL_OUTOF10: 0-NO PAIN

## 2025-05-09 ASSESSMENT — ENCOUNTER SYMPTOMS
RESPIRATORY NEGATIVE: 1
GASTROINTESTINAL NEGATIVE: 1
CARDIOVASCULAR NEGATIVE: 1
PSYCHIATRIC NEGATIVE: 1
DIZZINESS: 0
HEADACHES: 0

## 2025-05-09 NOTE — PATIENT INSTRUCTIONS
Was nice to see you for your wellness visit and follow-up today here several things that we talked about:  1.  Advanced directives please bring in the POA healthcare forms so that we can get those into your chart.  2.  Cancer screenings-your next mammogram will be due in December 2025.  It appears that your next colonoscopy will be due in 2029-I will try to get that report from the gastroenterologist  3.  Immunizations-you could benefit from shingles vaccines please get those at a local pharmacy.  On the good side you had pneumonia vaccine in 2023 and that will be needed again.  You also had your Tdap (tetanus/whooping cough) in 2018 and that will be needed again until 2028.  4.  Diabetes-it appears that your sugars are improving continue your medications as is  5.  High blood pressure-due to the recent adjustments in your medicines please be sure to do the blood work ordered by Dr. Patrick and also by Dr. Mckay.  6.  Chronic kidney disease-your numbers right now look as good as they have been lets continue to closely monitor those  7.  History of atrial fibrillation-I am glad to see in Dr. Patrick's note that he does not think this is particularly an active problem right now.  We will continue to follow his guidance.    All in all here is how I would handle things as far as we know.    1.  I do not think there was an actual intent to start that apixaban I would stay off of it.  2.  Continue all your medicines as currently being taken.  3.  I know you are somewhat troubled by leg swelling.  You may try some support stockings for your legs.  4.  I will plan on seeing you back in July.  I did order some blood work which is fairly comprehensive to do for me before that appointment.  Please of course also do the blood work as the timing is different for your specialists  5.  Do not forget to get your shingles vaccine at the pharmacy!    I look forward to seeing you in July

## 2025-05-09 NOTE — PROGRESS NOTES
[facility]: [facility]  MEDICARE WELLNESS EXAM      Daniela Epstein is a 74 y.o. female that is presenting today for Annual Exam.    Assessment/Plan    Diagnoses and all orders for this visit:  Annual physical exam  Primary hypertension  -     CBC and Auto Differential; Future  -     Comprehensive Metabolic Panel; Future  Vitamin D deficiency  -     Vitamin D 25-Hydroxy,Total (for eval of Vitamin D levels); Future  -     Vitamin D 25-Hydroxy,Total (for eval of Vitamin D levels); Future  Type 2 diabetes mellitus with stage 3b chronic kidney disease, with long-term current use of insulin (Multi)  -     Albumin-Creatinine Ratio, Urine Random; Future  Gastroesophageal reflux disease without esophagitis  Iron deficiency anemia, unspecified iron deficiency anemia type  Primary osteoarthritis, unspecified site  Diabetic peripheral neuropathy (Multi)  Vitamin B12 deficiency  Stage 3b chronic kidney disease (Multi)  Anemia, unspecified type  Bipolar affective disorder, remission status unspecified (Multi)  Hyperglycemia  -     Hemoglobin A1C; Future  Mixed hyperlipidemia  -     Lipid Panel; Future  -     TSH with reflex to Free T4 if abnormal; Future  Other orders  -     Follow Up In Primary Care - Medicare Annual  -     Follow Up In Primary Care - Established; Future  We completed the medicare wellness exam today.  The lifestyle is reviewed and recommendations for diet and exercise are made. We reviewed the immunizations and cancer screening and recommendations for needed immunizations and screenings are made.  The patient is independent in all ADL, shows no clinical signs of cognitive impairment, and is not falling or at risk for such.     Also sees Ria colunga, Nely, Jennifer    In terms of her annual wellness visit elements:  1.  Advanced directives her son who is present today is her power of  for healthcare but we do not have the documents in the chart asked him to bring them in  for us.  2.  Cancer screenings-she had a mammogram in December 2024 she had a colonoscopy clearly documented in the chart in 2020 but apparently had 1 last year as well which is not in the chart with a planned 5-year follow-up which would be 2029.  3.  Immunizations-she had Pneumovax in 2023 Tdap in 2018.  She has not yet had Shingrix and I recommended this for her.    Terms of her chronic medical problems we took some time to evaluate and manage such.  As part of that evaluation we reviewed some of the progress notes as well as her most recent blood work:  1.  Diabetes type 2-her numbers that she shows me today in the office show fasting sugars and generally are in the 100-1 20s range.  Yet her last hemoglobin A1c in March was 9.3%.  She is going to continue follow-up with Dr. Rodriguez but continue her current medicine as is should be due for another hemoglobin A1c first week of July  2.  High blood pressure-medicine adjustments recently made by Dr. Patrick and also being followed carefully by Dr. Mckay.  3.  Chronic kidney disease-her creatinine is actually lower than it had previously been which is great.  Dr. Mckay is watching that carefully.  4.  History of atrial fibrillation and previous anticoagulation-I reviewed her note from Dr. Patrick.  He notes that her current event monitor/loop recorder does not show any evidence of atrial fibrillation.  Also noted the patient had been taken off her apixaban at the time of her subdural hematomas.  It does seem like now that the benefits of this medicine are far away by the risks and that she probably should not be on it unless there is really evidence of ongoing atrial fibrillation problems at that time it should provoke a thorough conversation.    I will plan on seeing her back in July  ADVANCED CARE PLANNING  Advanced Care Planning was discussed with patient:  The patient has an active advanced care plan on file. The patient has an active surrogate decision-maker on  file.  Encouraged the patient to confirm that Living Will and Healthcare Power of  (HCPoA) are accurate and up to date.  Encouraged the patient to confirm that our office be provided a copy of any documentation in the event that anything changes.    ACTIVITIES OF DAILY LIVING  Basic ADLs:  Bathing: Independent, Dressing: Independent, Toileting: Independent, Transferring: Independent, Continence: Independent, Feeding: Independent.    Instrumental ADLs:  Ability to use phone: Independent, Shopping: Independent, Cooking: Independent, House-keeping: Independent, Laundry: Independent, Transportation: Independent, Medication Management: Independent, Finance Management: Independent.    Subjective   This 74-year-old is here for follow-up and she is accompanied by her son.  This is good to give us some family back up on all the things that have been happening with her.  She also has been seeing her numerous specialists over the last week.    She reports that she recently saw Dr. Patrick who started her on some lisinopril for her blood pressure.  She also has seen Dr. Mckay recently who acknowledged that and wanted to be sure that we checked her renal function very carefully.    It also appears that Dr. Patrick's office may have sent in a prescription for apixaban per the patient's report although she been off of this since the time that she had her subdural hematomas.      Review of Systems   Respiratory: Negative.     Cardiovascular: Negative.    Gastrointestinal: Negative.    Genitourinary: Negative.    Neurological:  Negative for dizziness and headaches.   Psychiatric/Behavioral: Negative.       Objective   Vitals:    05/09/25 1057   BP: 124/82   Pulse: 69   Temp: 36.2 °C (97.1 °F)   SpO2: 97%      Body mass index is 38.09 kg/m².  Physical Exam  Cardiovascular:      Rate and Rhythm: Normal rate and regular rhythm.      Pulses: Normal pulses.      Heart sounds: Normal heart sounds.   Pulmonary:      Effort:  "Pulmonary effort is normal.      Breath sounds: Normal breath sounds.   Abdominal:      General: Abdomen is flat. Bowel sounds are normal.      Palpations: Abdomen is soft.   Musculoskeletal:      Cervical back: Normal range of motion and neck supple.      Right lower leg: Edema present.      Left lower leg: Edema present.       Diagnostic Results   Lab Results   Component Value Date    GLUCOSE 140 (H) 04/28/2025    CALCIUM 9.1 04/28/2025     04/28/2025    K 3.7 04/28/2025    CO2 29 04/28/2025     04/28/2025    BUN 25 04/28/2025    CREATININE 1.42 (H) 04/28/2025     Lab Results   Component Value Date    ALT 13 03/26/2025    AST 12 03/26/2025    GGT 18 09/03/2018    ALKPHOS 158 (H) 03/26/2025    BILITOT 0.4 03/26/2025     Lab Results   Component Value Date    WBC 7.0 03/26/2025    HGB 11.1 (L) 03/26/2025    HCT 34.5 (L) 03/26/2025    MCV 83.3 03/26/2025     03/26/2025     Lab Results   Component Value Date    CHOL 193 01/31/2025    CHOL 187 07/23/2024    CHOL 167 01/25/2024     Lab Results   Component Value Date    HDL 74 01/31/2025    HDL 73.0 07/23/2024    HDL 74.0 01/25/2024     Lab Results   Component Value Date    LDLCALC 96 01/31/2025    LDLCALC 88 07/23/2024    LDLCALC 68 01/25/2024     Lab Results   Component Value Date    TRIG 133 01/31/2025    TRIG 130 07/23/2024    TRIG 127 01/25/2024     No components found for: \"CHOLHDL\"  Lab Results   Component Value Date    HGBA1C 9.3 (H) 03/26/2025     Other labs not included in the list above reviewed either before or during this encounter.    History   Medical History[1]  Surgical History[2]  Family History[3]  Social History     Socioeconomic History    Marital status:      Spouse name: Not on file    Number of children: Not on file    Years of education: Not on file    Highest education level: Not on file   Occupational History    Not on file   Tobacco Use    Smoking status: Never     Passive exposure: Never    Smokeless tobacco: Never "   Vaping Use    Vaping status: Never Used   Substance and Sexual Activity    Alcohol use: Not Currently    Drug use: Never    Sexual activity: Defer   Other Topics Concern    Not on file   Social History Narrative    Not on file     Social Drivers of Health     Financial Resource Strain: Not on file   Food Insecurity: Not on file   Transportation Needs: Not on file   Physical Activity: Not on file   Stress: Not on file   Social Connections: Not on file   Intimate Partner Violence: Not on file   Housing Stability: Not on file     Allergies[4]  Medications Ordered Prior to Encounter[5]  Immunization History   Administered Date(s) Administered    COVID-19, mRNA, LNP-S, PF, 30 mcg/0.3 mL dose 03/31/2021, 04/24/2021, 11/04/2021    DT (pediatric) 05/29/2007    Influenza, seasonal, injectable 10/29/2010, 11/25/2011    Pneumococcal conjugate vaccine, 13-valent (PREVNAR 13) 05/23/2018    Pneumococcal conjugate vaccine, 20-valent (PREVNAR 20) 07/12/2023    Pneumococcal polysaccharide vaccine, 23-valent, age 2 years and older (PNEUMOVAX 23) 10/29/2006    Tdap vaccine, age 7 year and older (BOOSTRIX, ADACEL) 05/23/2018     Patient's medical history was reviewed and updated either before or during this encounter.     Pablo Montoya MD       [1]   Past Medical History:  Diagnosis Date    Abdominal pain 06/07/2024    Acoustic neuroma (Multi) 08/30/2023    Acute lower urinary tract infection 08/30/2023    Acute otitis externa 06/07/2024    Acute renal failure 03/27/2023    Acute urinary tract infection 03/27/2023    Affective psychosis, bipolar (Multi) 08/30/2023    Age-related nuclear cataract, left eye 10/16/2015    Age-related nuclear cataract of left eye    Age-related nuclear cataract, right eye 10/16/2015    Age-related nuclear cataract of right eye    Allergic     Anemia     Anxiety     Anxiety disorder 08/30/2023    Arthritis     Bilateral dry eyes 08/30/2023    Bipolar disorder 08/30/2023    Breast lump 08/30/2023     Cardiac device in situ 08/30/2023    Cerebrovascular accident (CVA) (Multi) 06/07/2024    Cerebrovascular accident (Multi) 08/30/2023    Closed fracture of phalanx of great toe 02/08/2023    Constipation 02/17/2022    Dehydration 06/07/2024    Depression 08/30/2023    Depression, unspecified 05/20/2014    Depression    Diabetes mellitus (Multi) 08/30/2023    Diabetes mellitus, type 2 (Multi) 08/30/2023    Dry eye syndrome of unspecified lacrimal gland 06/25/2015    Dry eye syndrome    Dysfunction of right eustachian tube 08/30/2023    Dyslipidemia 08/30/2023    Dysphagia 08/30/2023    Essential (primary) hypertension 12/15/2013    Hypertension    Essential (primary) hypertension 08/30/2023    Falls 10/21/2024    Fibrocystic breast changes 08/30/2023    Fracture of bone adjacent to prosthesis 02/14/2022    Gastroesophageal reflux disease 08/30/2023    Hazy vision 08/30/2023    Hip pain 06/07/2024    History of cardiovascular surgery 02/13/2024    Hx of breast cancer 06/08/2016    Hypokalemia 08/30/2023    Infection due to Escherichia coli 03/27/2023    Knee stiffness 01/16/2023    Mastodynia 08/30/2023    Mental disorder 08/30/2023    Mixed hyperlipidemia 08/30/2023    Myopia, bilateral 10/16/2015    High myopia, both eyes    Nausea & vomiting 08/30/2023    Nose injury 08/30/2023    Obesity 03/27/2023    Oral mucosal lesion 08/30/2023    Osteoarthritis 08/30/2023    Other disorders affecting eyelid function 06/25/2015    Excessive involuntary blinking    Overactive bladder 08/30/2023    Pain in left hand 02/06/2023    Personal history of diseases of the blood and blood-forming organs and certain disorders involving the immune mechanism     History of anemia    Personal history of other diseases of the digestive system     History of esophageal reflux    Personal history of other diseases of the nervous system and sense organs     History of cataract    Personal history of other diseases of urinary system     History  of bladder problems    Personal history of other mental and behavioral disorders     History of mental disorder    Post-traumatic headache 02/13/2024    Pure hypercholesterolemia, unspecified 05/20/2014    High cholesterol    Rhinitis 08/30/2023    S/P knee replacement 08/30/2023    Severe myopia 05/07/2015    Snoring     Snoring    Solitary cyst of breast 08/30/2023    Stage 3b chronic kidney disease (Multi) 08/30/2023    Stenosis of lacrimal punctum 05/16/2019    Swelling, mass, or lump on face 08/30/2023    Syncope 08/30/2023    TMJ (temporomandibular joint syndrome) 08/30/2023    Type 2 diabetes mellitus without complications 06/25/2015    Diabetes mellitus    Unspecified injury of left wrist, hand and finger(s), initial encounter 02/06/2023    Unsteady gait 08/30/2023    Vaginal irritation 05/15/2024    Vitamin D deficiency 08/30/2023    Weakness 03/27/2023   [2]   Past Surgical History:  Procedure Laterality Date    ANKLE SURGERY  1998    injury    APPENDECTOMY  1972    BLEPHAROPLASTY Bilateral 2010    BREAST BIOPSY  2011    BREAST LUMPECTOMY Right 2011    atypical hyperplasia    CARDIAC SURGERY  03/06/2023    implant cardiac event recorder for recurrent syncope/ Dr. Fowler    CARPAL TUNNEL RELEASE Left 2010    Neuroplasty Decompression Median Nerve At Carpal Tunnel    CARPAL TUNNEL RELEASE Right 2010    CHOLECYSTECTOMY  1979    COLONOSCOPY  2007    COLONOSCOPY  2013    COLONOSCOPY  2014    COLONOSCOPY  11/06/2020    COLONOSCOPY  06/2024    CRANIOTOMY Right 2006    acoustic neuroma    CT ANGIO NECK  03/22/2023    CT NECK ANGIO W AND WO IV CONTRAST [facility] CT    CT HEAD ANGIO W AND WO IV CONTRAST  03/22/2023    CT HEAD ANGIO W AND WO IV CONTRAST [facility]    DILATION AND CURETTAGE OF UTERUS  1971    DILATION AND CURETTAGE OF UTERUS  2013    ESOPHAGOGASTRODUODENOSCOPY  2007    ESOPHAGOGASTRODUODENOSCOPY  2010    ESOPHAGOGASTRODUODENOSCOPY  2020    EYE SURGERY      FRACTURE SURGERY      HEMORRHOID SURGERY  1990     JOINT REPLACEMENT      KNEE SURGERY Right 2008    scar tissue    MOUTH SURGERY Right 2017    right buccal lesion    MR HEAD ANGIO WO IV CONTRAST  01/18/2015    MR HEAD ANGIO WO IV CONTRAST [facility]    MR HEAD ANGIO WO IV CONTRAST  05/08/2016    MR HEAD ANGIO WO IV CONTRAST [facility]    MR HEAD ANGIO WO IV CONTRAST  02/28/2017    MR HEAD ANGIO WO IV CONTRAST [facility]    MR NECK ANGIO WO IV CONTRAST  01/18/2015    MR NECK ANGIO WO IV CONTRAST [facility]    NASAL SINUS SURGERY  1985    ORIF FEMUR FRACTURE Right 2022    OVARIAN CYST REMOVAL  1972    SEPTOPLASTY  05/20/2014    Septoplasty    TONSILLECTOMY      TOTAL KNEE ARTHROPLASTY Right 2007    TOTAL KNEE ARTHROPLASTY Left 2011    TUBAL LIGATION  1983    URETHRAL SLING  2013   [3]   Family History  Problem Relation Name Age of Onset    Arthritis Mother Paty     Breast cancer Mother Paty     Diabetes Mother Paty     Hypertension Mother Paty     Heart failure Father Luis     Heart disease Father Luis     Arthritis Other SIBLINGS     Diabetes Other SIBLINGS     Hypertension Other SIBLINGS    [4]   Allergies  Allergen Reactions    Other Other     TARTICK DYSKENESIA    Phenothiazines Hives, Shortness of breath, Other and Unknown     Slurred speech    Erythromycin Hives, Unknown and Rash    Cyclosporine Other     burning    Fish Oil Unknown    Haloperidol Unknown   [5]   Current Outpatient Medications on File Prior to Visit   Medication Sig Dispense Refill    atorvastatin (Lipitor) 40 mg tablet Take 1 tablet (40 mg) by mouth once daily.      Austedo 12 mg tablet TAKE ONE TABLET BY MOUTH ONCE DAILY WITH 6 MG TABLET.      Austedo 6 mg tablet TAKE ONE TABLET BY MOUTH ONCE DAILY WITH 12 MG TABLET.      bacitracin-polymyxin B (Polysporin) ophthalmic ointment Apply 1 Application to left eye if needed.      blood sugar diagnostic strip Inject under the skin once daily.      chlorzoxazone (Parafon Forte) 500 mg tablet take 1  "tablet by mouth three times a day as needed for muscle spasm (headache or neck pain)      clobetasol (Temovate) 0.05 % ointment apply 1 application externally once a day      fluocinonide 0.05 % cream Apply topically to affected areas on the legs twice a day as needed flares      furosemide (Lasix) 20 mg tablet Take 2 tablets (40 mg) by mouth once daily. 60 tablet 2    gabapentin (Neurontin) 300 mg capsule Take 1 capsule (300 mg) by mouth. AT BEDTIME      glipiZIDE (Glucotrol) 5 mg tablet Take 2 tablets (10 mg) by mouth 2 times a day. 2 tabs in the am, 2 tablet in the pm 360 tablet 3    insulin aspart (NovoLOG Flexpen U-100 Insulin) 100 unit/mL (3 mL) pen Take 3 units twice daily with meals as directed per insulin instructions. 15 mL 1    insulin glargine (Basaglar KwikPen U-100 Insulin) 100 unit/mL (3 mL) pen Inject 12 Units under the skin once daily at bedtime. Take as directed per insulin instructions. 15 mL 3    lamoTRIgine (LaMICtal) 200 mg tablet Take 1 tablet (200 mg) by mouth once daily. ODT?      lisinopril 5 mg tablet Take 1 tablet (5 mg) by mouth once daily. 30 tablet 11    loperamide (Imodium A-D) 2 mg tablet Take 1 tablet (2 mg) by mouth 4 times a day as needed.      lubiprostone (Amitiza) 8 mcg capsule once daily with breakfast.      omeprazole (PriLOSEC) 40 mg DR capsule Take 1 capsule (40 mg) by mouth once daily in the morning. Take before meals. Do not crush or chew. 90 capsule 3    oxyBUTYnin XL (Ditropan-XL) 10 mg 24 hr tablet Take 1 tablet (10 mg) by mouth once daily.      pen needle, diabetic 32 gauge x 5/32\" needle Use daily with insulin injection. 100 each 1    psyllium (Metamucil) 3.4 gram packet Take 1 packet by mouth if needed.      sertraline (Zoloft) 50 mg tablet Take 1 tablet (50 mg) by mouth once daily.      [DISCONTINUED] apixaban (Eliquis) 5 mg tablet Take 1 tablet (5 mg) by mouth 2 times a day. (Patient not taking: Reported on 5/9/2025) 60 tablet 11     No current " facility-administered medications on file prior to visit.

## 2025-05-09 NOTE — TELEPHONE ENCOUNTER
Receive message from  staff   Patient called questioning if she should be taking her Eliquis medication. Originally prescribed back in 11/2024, patient reported to not be taking the medication in 4/2025. Advised she does have an active prescription for the medication and has unitl the 21st of this month to active the RX with the pharmacy. However, there is no indication in the most recent visit of her to be taking the med. Please advise if she should still take this med. Patient can be reached at [phone]. Questioning if she does start the medication will it affect kidneys       She is not sure should she be taking this medication the chart states that she is not currently on eliquis  bUT on 04/07/25 it was listed patient not taking medication but has a active  RX  HOW TO PROCEED WITH THIS   she want to know should she be taking this medication     Called patient back to let her know that she needs to continue her eliquis due to her having a history of afib in the past patient did verbalize understanding

## 2025-05-14 ENCOUNTER — ANCILLARY PROCEDURE (OUTPATIENT)
Facility: CLINIC | Age: 75
End: 2025-05-14
Payer: MEDICARE

## 2025-05-14 DIAGNOSIS — Z95.9 CARDIAC DEVICE IN SITU: ICD-10-CM

## 2025-05-14 DIAGNOSIS — R55 SYNCOPE, UNSPECIFIED SYNCOPE TYPE: ICD-10-CM

## 2025-05-19 ENCOUNTER — ANCILLARY PROCEDURE (OUTPATIENT)
Facility: CLINIC | Age: 75
End: 2025-05-19
Payer: MEDICARE

## 2025-05-19 DIAGNOSIS — R55 SYNCOPE AND COLLAPSE: ICD-10-CM

## 2025-05-19 DIAGNOSIS — Z95.9 CARDIAC DEVICE IN SITU: ICD-10-CM

## 2025-05-19 DIAGNOSIS — I48.91 ATRIAL FIBRILLATION, UNSPECIFIED TYPE (MULTI): ICD-10-CM

## 2025-05-19 DIAGNOSIS — Z95.9 CARDIAC DEVICE IN SITU: Primary | ICD-10-CM

## 2025-05-20 DIAGNOSIS — I10 PRIMARY HYPERTENSION: ICD-10-CM

## 2025-05-20 LAB
ANION GAP SERPL CALCULATED.4IONS-SCNC: 10 MMOL/L (CALC) (ref 7–17)
BUN SERPL-MCNC: 35 MG/DL (ref 7–25)
BUN/CREAT SERPL: 20 (CALC) (ref 6–22)
CALCIUM SERPL-MCNC: 9.2 MG/DL (ref 8.6–10.4)
CHLORIDE SERPL-SCNC: 103 MMOL/L (ref 98–110)
CO2 SERPL-SCNC: 25 MMOL/L (ref 20–32)
CREAT SERPL-MCNC: 1.74 MG/DL (ref 0.6–1)
EGFRCR SERPLBLD CKD-EPI 2021: 30 ML/MIN/1.73M2
GLUCOSE SERPL-MCNC: 143 MG/DL (ref 65–139)
POTASSIUM SERPL-SCNC: 4.6 MMOL/L (ref 3.5–5.3)
SODIUM SERPL-SCNC: 138 MMOL/L (ref 135–146)

## 2025-05-21 ENCOUNTER — HOSPITAL ENCOUNTER (OUTPATIENT)
Dept: CARDIOLOGY | Facility: CLINIC | Age: 75
Discharge: HOME | End: 2025-05-21
Payer: MEDICARE

## 2025-05-21 ENCOUNTER — ANCILLARY PROCEDURE (OUTPATIENT)
Facility: CLINIC | Age: 75
End: 2025-05-21
Payer: MEDICARE

## 2025-05-21 DIAGNOSIS — R55 SYNCOPE AND COLLAPSE: ICD-10-CM

## 2025-05-21 DIAGNOSIS — R55 SYNCOPE AND COLLAPSE: Primary | ICD-10-CM

## 2025-05-21 DIAGNOSIS — R55 SYNCOPE, UNSPECIFIED SYNCOPE TYPE: ICD-10-CM

## 2025-05-21 DIAGNOSIS — Z95.9 CARDIAC DEVICE IN SITU: ICD-10-CM

## 2025-05-21 DIAGNOSIS — R60.0 LOCALIZED EDEMA: ICD-10-CM

## 2025-05-21 PROCEDURE — 93306 TTE W/DOPPLER COMPLETE: CPT | Performed by: INTERNAL MEDICINE

## 2025-05-21 PROCEDURE — 93298 REM INTERROG DEV EVAL SCRMS: CPT

## 2025-05-21 PROCEDURE — 2500000004 HC RX 250 GENERAL PHARMACY W/ HCPCS (ALT 636 FOR OP/ED): Mod: JZ | Performed by: STUDENT IN AN ORGANIZED HEALTH CARE EDUCATION/TRAINING PROGRAM

## 2025-05-21 PROCEDURE — C8929 TTE W OR WO FOL WCON,DOPPLER: HCPCS

## 2025-05-21 PROCEDURE — 93298 REM INTERROG DEV EVAL SCRMS: CPT | Performed by: INTERNAL MEDICINE

## 2025-05-21 RX ADMIN — PERFLUTREN 10 ML OF DILUTION: 6.52 INJECTION, SUSPENSION INTRAVENOUS at 14:05

## 2025-05-24 LAB
AORTIC VALVE PEAK VELOCITY: 1.26 M/S
AV PEAK GRADIENT: 6 MMHG
AVA (PEAK VEL): 1.68 CM2
EJECTION FRACTION APICAL 4 CHAMBER: 53.5
EJECTION FRACTION: 53 %
LEFT ATRIUM VOLUME AREA LENGTH INDEX BSA: 25.8 ML/M2
LEFT VENTRICLE INTERNAL DIMENSION DIASTOLE: 4.8 CM (ref 3.5–6)
LEFT VENTRICULAR OUTFLOW TRACT DIAMETER: 1.98 CM
MITRAL VALVE E/A RATIO: 0.57
RIGHT VENTRICLE FREE WALL PEAK S': 10.05 CM/S
RIGHT VENTRICLE PEAK SYSTOLIC PRESSURE: 23.5 MMHG
TRICUSPID ANNULAR PLANE SYSTOLIC EXCURSION: 1.7 CM

## 2025-05-27 ENCOUNTER — ANCILLARY PROCEDURE (OUTPATIENT)
Facility: CLINIC | Age: 75
End: 2025-05-27
Payer: MEDICARE

## 2025-05-27 DIAGNOSIS — R55 SYNCOPE, UNSPECIFIED SYNCOPE TYPE: ICD-10-CM

## 2025-05-27 DIAGNOSIS — Z95.9 CARDIAC DEVICE IN SITU: ICD-10-CM

## 2025-06-06 DIAGNOSIS — E78.2 MIXED HYPERLIPIDEMIA: ICD-10-CM

## 2025-06-06 RX ORDER — ATORVASTATIN CALCIUM 40 MG/1
40 TABLET, FILM COATED ORAL DAILY
Qty: 90 TABLET | Refills: 3 | Status: SHIPPED | OUTPATIENT
Start: 2025-06-06

## 2025-06-09 NOTE — PROGRESS NOTES
HPI   75 yo with DM Type 2 (+neuropathy, diagnosed in her 50's), CKD (Azem), HTN, HLD, Bipolar, Depression, TIA.   Last A1c 9.3% (Jan 2025), today 7.5%.    Patient testing sugars >4 times daily with [facility] 3 phone tiana. Lows 4-5 time weekly, mostly ovrnight. Following carb controlled diet somewhat and know reasonable carb allowances. Patient able to afford their medications. Patient is exercising, attends aquatic therapy twice a week.     Taking Basaglar 12 units daily, glipizide 5 mg 2 tablets every morning and 2 tablets every evening.   -novolog 3 units twice daily with meals after last visit    -no longer qualifies for  PAP Jardiance 25mg daily, Tradjenta 5mg daily stopped last visit  -metformin - stopped d/t CKD.  -GLP1 - history chronic constipation.    30 days latha 3 data: 61% target range 5% lows, pattern: low 200's overnight dropping to less than 100 on waking, mid 100's before first meal, upper 100's - mid 200's after first meal, mid 100's before dinner, low 200's after dinner into bedtime.  -high variability 45%    -taking lisinopril 10 mg, lasix 20 mg for htn     Taking atorvastatin 40 mg daily for lipids, tolerating      Current Outpatient Medications:     atorvastatin (Lipitor) 40 mg tablet, TAKE ONE TABLET BY MOUTH once DAILY, Disp: 90 tablet, Rfl: 3    Austedo 12 mg tablet, TAKE ONE TABLET BY MOUTH ONCE DAILY WITH 6 MG TABLET., Disp: , Rfl:     Austedo 6 mg tablet, TAKE ONE TABLET BY MOUTH ONCE DAILY WITH 12 MG TABLET., Disp: , Rfl:     bacitracin-polymyxin B (Polysporin) ophthalmic ointment, Apply 1 Application to left eye if needed., Disp: , Rfl:     blood sugar diagnostic strip, Inject under the skin once daily., Disp: , Rfl:     chlorzoxazone (Parafon Forte) 500 mg tablet, take 1 tablet by mouth three times a day as needed for muscle spasm (headache or neck pain), Disp: , Rfl:     clobetasol (Temovate) 0.05 % ointment, apply 1 application externally once a day, Disp: , Rfl:     fluocinonide 0.05  "% cream, Apply topically to affected areas on the legs twice a day as needed flares, Disp: , Rfl:     furosemide (Lasix) 20 mg tablet, Take 2 tablets (40 mg) by mouth once daily., Disp: 60 tablet, Rfl: 2    gabapentin (Neurontin) 300 mg capsule, Take 1 capsule (300 mg) by mouth. AT BEDTIME, Disp: , Rfl:     glipiZIDE (Glucotrol) 5 mg tablet, Take 2 tablets (10 mg) by mouth 2 times a day. 2 tabs in the am, 2 tablet in the pm, Disp: 360 tablet, Rfl: 3    insulin aspart (NovoLOG Flexpen U-100 Insulin) 100 unit/mL (3 mL) pen, Take 3 units twice daily with meals as directed per insulin instructions., Disp: 15 mL, Rfl: 1    insulin glargine (Basaglar KwikPen U-100 Insulin) 100 unit/mL (3 mL) pen, Inject 12 Units under the skin once daily at bedtime. Take as directed per insulin instructions., Disp: 15 mL, Rfl: 3    lamoTRIgine (LaMICtal) 200 mg tablet, Take 1 tablet (200 mg) by mouth once daily. ODT?, Disp: , Rfl:     lisinopril 5 mg tablet, Take 1 tablet (5 mg) by mouth once daily., Disp: 30 tablet, Rfl: 11    loperamide (Imodium A-D) 2 mg tablet, Take 1 tablet (2 mg) by mouth 4 times a day as needed., Disp: , Rfl:     lubiprostone (Amitiza) 8 mcg capsule, once daily with breakfast., Disp: , Rfl:     omeprazole (PriLOSEC) 40 mg DR capsule, Take 1 capsule (40 mg) by mouth once daily in the morning. Take before meals. Do not crush or chew., Disp: 90 capsule, Rfl: 3    oxyBUTYnin XL (Ditropan-XL) 10 mg 24 hr tablet, Take 1 tablet (10 mg) by mouth once daily., Disp: , Rfl:     pen needle, diabetic 32 gauge x 5/32\" needle, Use daily with insulin injection., Disp: 100 each, Rfl: 1    psyllium (Metamucil) 3.4 gram packet, Take 1 packet by mouth if needed., Disp: , Rfl:     sertraline (Zoloft) 50 mg tablet, Take 1 tablet (50 mg) by mouth once daily., Disp: , Rfl:     Allergies as of 06/11/2025 - Reviewed 06/11/2025   Allergen Reaction Noted    Other Other 08/30/2023    Phenothiazines Hives, Shortness of breath, Other, and " "Unknown 08/30/2023    Erythromycin Hives, Unknown, and Rash 08/30/2023    Cyclosporine Other 08/02/2017    Fish oil Unknown 09/27/2024    Haloperidol Unknown 08/30/2023       BP 98/62 (BP Location: Left arm, Patient Position: Sitting)   Pulse 91   Ht 1.6 m (5' 3\")   Wt 95.9 kg (211 lb 6.4 oz)   BMI 37.45 kg/m²     Labs:   Lab Results   Component Value Date    WBC 7.0 03/26/2025    NRBC 0.0 06/26/2024    RBC 4.14 03/26/2025    HGB 11.1 (L) 03/26/2025    HCT 34.5 (L) 03/26/2025     03/26/2025     Lab Results   Component Value Date    CALCIUM 9.2 05/19/2025    AST 12 03/26/2025    ALKPHOS 158 (H) 03/26/2025    BILITOT 0.4 03/26/2025    PROT 7.0 03/26/2025    ALBUMIN 3.7 03/26/2025    GLOB 2.9 07/06/2023    AGR 1.4 (L) 07/06/2023     05/19/2025    K 4.6 05/19/2025     05/19/2025    CO2 25 05/19/2025    ANIONGAP 10 05/19/2025    BUN 35 (H) 05/19/2025    CREATININE 1.74 (H) 05/19/2025    UREACREAUR 15.6 07/06/2023    GLUCOSE 143 (H) 05/19/2025    ALT 13 03/26/2025    EGFR 30 (L) 05/19/2025     Lab Results   Component Value Date    CHOL 193 01/31/2025    TRIG 133 01/31/2025    HDL 74 01/31/2025    LDLCALC 96 01/31/2025     Lab Results   Component Value Date    MICROALBCREA  06/26/2024      Comment:      One or more analytes used in this calculation is outside of the analytical measurement range. Calculation cannot be performed.     Lab Results   Component Value Date    TSH 3.24 01/31/2025     Lab Results   Component Value Date    RIVYHDLT97 489 01/31/2025     Lab Results   Component Value Date    HGBA1C 7.5 (A) 06/11/2025       Assessment/Plan   1. Type 2 diabetes mellitus with stage 3b chronic kidney disease, with long-term current use of insulin (Multi) (Primary)  -A1c ordered and reviewed  -labs reviewed  -libreview data reviewed (scanned in epic)    -dropping overnight, decrease basaglar to 8 units  -add 2-4 units of novolog for higher carb meal  -do not take novolog if planning increased " activity or at senior center  -decrease glipizide to 5 mg bid    -reviewed CHO foods, meal planning, reinforced carb limits and protein snacks  -reviewed symptoms, treatment and prevention of hypoglycemia, reinforced carrying glucose source at all times.    2. Primary hypertension  -at target    3. Mixed hyperlipidemia  -on statin and tolerating        Follow up: 4 months - donnie la  8 months - dr. diane    -labs/tests/notes reviewed  -reviewed and counseled patient on medication monitoring and side effects    Treatment and plan discussed with Dr. Diane. Neal [facility] Certified [facility] and     Medical Decision Making  Complexity of problem: Chronic illness of diabetes mellitus uncontrolled, progressing  Data analyzed and reviewed: Reviewed prior notes, blood glucose data, labs including HgbA1c, lipids, serum chemistries.  Ordered tests.   Risk of complications and morbidities: Is definite because of use of insulin and risk of hypoglycemia.  Prescription medications reviewed and modifications made.  Compliance assessed.  Addressed social determinants of health including food insecurity.

## 2025-06-10 ENCOUNTER — ANCILLARY PROCEDURE (OUTPATIENT)
Facility: CLINIC | Age: 75
End: 2025-06-10
Payer: MEDICARE

## 2025-06-10 ENCOUNTER — TELEPHONE (OUTPATIENT)
Dept: ORTHOPEDIC SURGERY | Facility: CLINIC | Age: 75
End: 2025-06-10
Payer: MEDICARE

## 2025-06-10 DIAGNOSIS — R55 SYNCOPE, UNSPECIFIED SYNCOPE TYPE: ICD-10-CM

## 2025-06-10 DIAGNOSIS — Z95.9 CARDIAC DEVICE IN SITU: ICD-10-CM

## 2025-06-10 NOTE — TELEPHONE ENCOUNTER
patient had surgery with you on 6/25/2020, she states her hand keeps getting cold and disclored.She states this been going on for 2 months now. Please advise

## 2025-06-11 ENCOUNTER — ANCILLARY PROCEDURE (OUTPATIENT)
Facility: CLINIC | Age: 75
End: 2025-06-11
Payer: MEDICARE

## 2025-06-11 ENCOUNTER — APPOINTMENT (OUTPATIENT)
Dept: ENDOCRINOLOGY | Facility: CLINIC | Age: 75
End: 2025-06-11
Payer: MEDICARE

## 2025-06-11 VITALS
HEIGHT: 63 IN | HEART RATE: 91 BPM | DIASTOLIC BLOOD PRESSURE: 62 MMHG | SYSTOLIC BLOOD PRESSURE: 98 MMHG | BODY MASS INDEX: 37.46 KG/M2 | WEIGHT: 211.4 LBS

## 2025-06-11 DIAGNOSIS — Z79.4 TYPE 2 DIABETES MELLITUS WITH STAGE 3B CHRONIC KIDNEY DISEASE, WITH LONG-TERM CURRENT USE OF INSULIN (MULTI): Primary | ICD-10-CM

## 2025-06-11 DIAGNOSIS — R55 SYNCOPE, UNSPECIFIED SYNCOPE TYPE: ICD-10-CM

## 2025-06-11 DIAGNOSIS — I10 PRIMARY HYPERTENSION: ICD-10-CM

## 2025-06-11 DIAGNOSIS — Z95.9 CARDIAC DEVICE IN SITU: ICD-10-CM

## 2025-06-11 DIAGNOSIS — E11.22 TYPE 2 DIABETES MELLITUS WITH STAGE 3B CHRONIC KIDNEY DISEASE, WITH LONG-TERM CURRENT USE OF INSULIN (MULTI): Primary | ICD-10-CM

## 2025-06-11 DIAGNOSIS — E78.2 MIXED HYPERLIPIDEMIA: ICD-10-CM

## 2025-06-11 DIAGNOSIS — N18.32 TYPE 2 DIABETES MELLITUS WITH STAGE 3B CHRONIC KIDNEY DISEASE, WITH LONG-TERM CURRENT USE OF INSULIN (MULTI): Primary | ICD-10-CM

## 2025-06-11 LAB — POC HEMOGLOBIN A1C: 7.5 % (ref 4.2–6.5)

## 2025-06-11 PROCEDURE — 83036 HEMOGLOBIN GLYCOSYLATED A1C: CPT | Mod: QW | Performed by: INTERNAL MEDICINE

## 2025-06-11 PROCEDURE — 99214 OFFICE O/P EST MOD 30 MIN: CPT | Performed by: INTERNAL MEDICINE

## 2025-06-11 PROCEDURE — 83036 HEMOGLOBIN GLYCOSYLATED A1C: CPT | Performed by: REGISTERED NURSE

## 2025-06-11 PROCEDURE — 95251 CONT GLUC MNTR ANALYSIS I&R: CPT | Performed by: INTERNAL MEDICINE

## 2025-06-11 ASSESSMENT — ENCOUNTER SYMPTOMS
DEPRESSION: 0
OCCASIONAL FEELINGS OF UNSTEADINESS: 0
LOSS OF SENSATION IN FEET: 0

## 2025-06-11 ASSESSMENT — PATIENT HEALTH QUESTIONNAIRE - PHQ9
1. LITTLE INTEREST OR PLEASURE IN DOING THINGS: NOT AT ALL
SUM OF ALL RESPONSES TO PHQ9 QUESTIONS 1 & 2: 0
2. FEELING DOWN, DEPRESSED OR HOPELESS: NOT AT ALL

## 2025-06-11 ASSESSMENT — PAIN SCALES - GENERAL: PAINLEVEL_OUTOF10: 0-NO PAIN

## 2025-06-11 NOTE — PROGRESS NOTES
Attestation signed by Patrick Diane MD on 6/11/25 at 4:30 PM.    I, Dr Patrick Diane, have reviewed this progress note, medication list, vital signs, any pertinent lab values, and any CGM data if present with the Certified [facility] and  face to face during this visit today. This note reflects the treatment plan that was made under my direction after reviewing the above mentioned elements while face to face with the patient and CDE.  I personally answered and addressed any questions and concerns the patient had during the visit today.  The CDE entered the data in this note under my direction and I personally reviewed it, signed any lab or medication orders that I instructed to be completed. I am the billing provider for this visit and the level of service was determined by my involvement in the Medical Decision Making Component of this visit while face to face with the patient.

## 2025-06-11 NOTE — TELEPHONE ENCOUNTER
This sounds as if it is Raynaud's phenomenon or a vascular issue.  If it is severe she could see rheumatology, or vascular surgery

## 2025-06-11 NOTE — PATIENT INSTRUCTIONS
Decrease glipizide to 1 pill breakfast and dinner    Decrease Basaglar to 8 units daily    Novolog 3 units before meals  If having large carb meal add 2-4 units of novolog    On active days, swimming, senior center etc - do not take novolog    Carry sugar source with you at all times to treat lows; glucose tablets, candy, juice box

## 2025-06-17 DIAGNOSIS — E11.22 TYPE 2 DIABETES MELLITUS WITH STAGE 3B CHRONIC KIDNEY DISEASE, WITH LONG-TERM CURRENT USE OF INSULIN (MULTI): ICD-10-CM

## 2025-06-17 DIAGNOSIS — Z79.4 TYPE 2 DIABETES MELLITUS WITH STAGE 3B CHRONIC KIDNEY DISEASE, WITH LONG-TERM CURRENT USE OF INSULIN (MULTI): ICD-10-CM

## 2025-06-17 DIAGNOSIS — N18.32 TYPE 2 DIABETES MELLITUS WITH STAGE 3B CHRONIC KIDNEY DISEASE, WITH LONG-TERM CURRENT USE OF INSULIN (MULTI): ICD-10-CM

## 2025-06-17 NOTE — TELEPHONE ENCOUNTER
Patient using more novolog during day and is looking for increase in dose /another pen?-please advise

## 2025-06-18 ENCOUNTER — TELEPHONE (OUTPATIENT)
Dept: PRIMARY CARE | Facility: CLINIC | Age: 75
End: 2025-06-18
Payer: MEDICARE

## 2025-06-18 RX ORDER — INSULIN ASPART 100 [IU]/ML
INJECTION, SOLUTION INTRAVENOUS; SUBCUTANEOUS
Qty: 15 ML | Refills: 1 | Status: SHIPPED | OUTPATIENT
Start: 2025-06-18

## 2025-06-18 NOTE — TELEPHONE ENCOUNTER
Pt reports elevated sed rate on [facility] lab work. States that this test was performed while she was being evaluated for her achilles tendonitis. Would like to know if she should be seen before her follow up in July or if you believe any additional testing is necessary before then?

## 2025-06-20 ENCOUNTER — ANCILLARY PROCEDURE (OUTPATIENT)
Facility: CLINIC | Age: 75
End: 2025-06-20
Payer: MEDICARE

## 2025-06-20 DIAGNOSIS — R55 SYNCOPE AND COLLAPSE: Primary | ICD-10-CM

## 2025-06-20 DIAGNOSIS — Z95.9 CARDIAC DEVICE IN SITU: ICD-10-CM

## 2025-06-20 DIAGNOSIS — R55 SYNCOPE AND COLLAPSE: ICD-10-CM

## 2025-06-23 ENCOUNTER — ANCILLARY PROCEDURE (OUTPATIENT)
Facility: CLINIC | Age: 75
End: 2025-06-23
Payer: MEDICARE

## 2025-06-23 DIAGNOSIS — I48.91 ATRIAL FIBRILLATION, UNSPECIFIED TYPE (MULTI): ICD-10-CM

## 2025-06-23 DIAGNOSIS — R55 SYNCOPE AND COLLAPSE: ICD-10-CM

## 2025-06-23 DIAGNOSIS — Z95.9 CARDIAC DEVICE IN SITU: ICD-10-CM

## 2025-06-23 DIAGNOSIS — R55 SYNCOPE AND COLLAPSE: Primary | ICD-10-CM

## 2025-06-27 ENCOUNTER — ANCILLARY PROCEDURE (OUTPATIENT)
Facility: CLINIC | Age: 75
End: 2025-06-27
Payer: MEDICARE

## 2025-06-27 DIAGNOSIS — Z95.9 CARDIAC DEVICE IN SITU: ICD-10-CM

## 2025-06-27 DIAGNOSIS — R55 SYNCOPE, UNSPECIFIED SYNCOPE TYPE: ICD-10-CM

## 2025-06-30 ENCOUNTER — ANCILLARY PROCEDURE (OUTPATIENT)
Facility: CLINIC | Age: 75
End: 2025-06-30
Payer: MEDICARE

## 2025-06-30 DIAGNOSIS — I48.91 ATRIAL FIBRILLATION, UNSPECIFIED TYPE (MULTI): ICD-10-CM

## 2025-06-30 DIAGNOSIS — Z95.9 CARDIAC DEVICE IN SITU: ICD-10-CM

## 2025-06-30 DIAGNOSIS — R55 SYNCOPE AND COLLAPSE: ICD-10-CM

## 2025-06-30 DIAGNOSIS — R55 SYNCOPE AND COLLAPSE: Primary | ICD-10-CM

## 2025-07-02 ENCOUNTER — ANCILLARY PROCEDURE (OUTPATIENT)
Facility: CLINIC | Age: 75
End: 2025-07-02
Payer: MEDICARE

## 2025-07-02 DIAGNOSIS — E78.2 MIXED HYPERLIPIDEMIA: ICD-10-CM

## 2025-07-02 DIAGNOSIS — Z95.9 CARDIAC DEVICE IN SITU: ICD-10-CM

## 2025-07-02 DIAGNOSIS — I10 PRIMARY HYPERTENSION: ICD-10-CM

## 2025-07-02 DIAGNOSIS — E11.22 TYPE 2 DIABETES MELLITUS WITH STAGE 3B CHRONIC KIDNEY DISEASE, WITH LONG-TERM CURRENT USE OF INSULIN (MULTI): ICD-10-CM

## 2025-07-02 DIAGNOSIS — E55.9 VITAMIN D DEFICIENCY: ICD-10-CM

## 2025-07-02 DIAGNOSIS — R73.9 HYPERGLYCEMIA: ICD-10-CM

## 2025-07-02 DIAGNOSIS — R55 SYNCOPE AND COLLAPSE: ICD-10-CM

## 2025-07-02 DIAGNOSIS — Z79.4 TYPE 2 DIABETES MELLITUS WITH STAGE 3B CHRONIC KIDNEY DISEASE, WITH LONG-TERM CURRENT USE OF INSULIN (MULTI): ICD-10-CM

## 2025-07-02 DIAGNOSIS — I48.91 ATRIAL FIBRILLATION, UNSPECIFIED TYPE (MULTI): ICD-10-CM

## 2025-07-02 DIAGNOSIS — N18.32 TYPE 2 DIABETES MELLITUS WITH STAGE 3B CHRONIC KIDNEY DISEASE, WITH LONG-TERM CURRENT USE OF INSULIN (MULTI): ICD-10-CM

## 2025-07-10 LAB
25(OH)D3+25(OH)D2 SERPL-MCNC: 34 NG/ML (ref 30–100)
ALBUMIN SERPL-MCNC: 3.8 G/DL (ref 3.6–5.1)
ALBUMIN/CREAT UR: 6 MG/G CREAT
ALP SERPL-CCNC: 145 U/L (ref 37–153)
ALT SERPL-CCNC: 7 U/L (ref 6–29)
ANION GAP SERPL CALCULATED.4IONS-SCNC: 8 MMOL/L (CALC) (ref 7–17)
AST SERPL-CCNC: 12 U/L (ref 10–35)
BASOPHILS # BLD AUTO: 92 CELLS/UL (ref 0–200)
BASOPHILS NFR BLD AUTO: 1.5 %
BILIRUB SERPL-MCNC: 0.4 MG/DL (ref 0.2–1.2)
BUN SERPL-MCNC: 28 MG/DL (ref 7–25)
CALCIUM SERPL-MCNC: 9 MG/DL (ref 8.6–10.4)
CHLORIDE SERPL-SCNC: 106 MMOL/L (ref 98–110)
CHOLEST SERPL-MCNC: 170 MG/DL
CHOLEST/HDLC SERPL: 2.7 (CALC)
CO2 SERPL-SCNC: 27 MMOL/L (ref 20–32)
CREAT SERPL-MCNC: 1.68 MG/DL (ref 0.6–1)
CREAT UR-MCNC: 88 MG/DL (ref 20–275)
EGFRCR SERPLBLD CKD-EPI 2021: 32 ML/MIN/1.73M2
EOSINOPHIL # BLD AUTO: 262 CELLS/UL (ref 15–500)
EOSINOPHIL NFR BLD AUTO: 4.3 %
ERYTHROCYTE [DISTWIDTH] IN BLOOD BY AUTOMATED COUNT: 13.4 % (ref 11–15)
EST. AVERAGE GLUCOSE BLD GHB EST-MCNC: 180 MG/DL
EST. AVERAGE GLUCOSE BLD GHB EST-SCNC: 10 MMOL/L
GLUCOSE SERPL-MCNC: 139 MG/DL (ref 65–99)
HBA1C MFR BLD: 7.9 %
HCT VFR BLD AUTO: 35.7 % (ref 35–45)
HDLC SERPL-MCNC: 62 MG/DL
HGB BLD-MCNC: 10.9 G/DL (ref 11.7–15.5)
LDLC SERPL CALC-MCNC: 82 MG/DL (CALC)
LYMPHOCYTES # BLD AUTO: 1775 CELLS/UL (ref 850–3900)
LYMPHOCYTES NFR BLD AUTO: 29.1 %
MCH RBC QN AUTO: 25.8 PG (ref 27–33)
MCHC RBC AUTO-ENTMCNC: 30.5 G/DL (ref 32–36)
MCV RBC AUTO: 84.4 FL (ref 80–100)
MICROALBUMIN UR-MCNC: 0.5 MG/DL
MONOCYTES # BLD AUTO: 500 CELLS/UL (ref 200–950)
MONOCYTES NFR BLD AUTO: 8.2 %
NEUTROPHILS # BLD AUTO: 3471 CELLS/UL (ref 1500–7800)
NEUTROPHILS NFR BLD AUTO: 56.9 %
NONHDLC SERPL-MCNC: 108 MG/DL (CALC)
PLATELET # BLD AUTO: 328 THOUSAND/UL (ref 140–400)
PMV BLD REES-ECKER: 10.2 FL (ref 7.5–12.5)
POTASSIUM SERPL-SCNC: 4.7 MMOL/L (ref 3.5–5.3)
PROT SERPL-MCNC: 6.8 G/DL (ref 6.1–8.1)
RBC # BLD AUTO: 4.23 MILLION/UL (ref 3.8–5.1)
SODIUM SERPL-SCNC: 141 MMOL/L (ref 135–146)
TRIGL SERPL-MCNC: 160 MG/DL
TSH SERPL-ACNC: 2.52 MIU/L (ref 0.4–4.5)
WBC # BLD AUTO: 6.1 THOUSAND/UL (ref 3.8–10.8)

## 2025-07-11 ENCOUNTER — TELEPHONE (OUTPATIENT)
Dept: PRIMARY CARE | Facility: CLINIC | Age: 75
End: 2025-07-11
Payer: MEDICARE

## 2025-07-11 DIAGNOSIS — N17.9 ACUTE RENAL FAILURE, UNSPECIFIED ACUTE RENAL FAILURE TYPE: ICD-10-CM

## 2025-07-15 ENCOUNTER — TELEPHONE (OUTPATIENT)
Dept: PRIMARY CARE | Facility: CLINIC | Age: 75
End: 2025-07-15
Payer: MEDICARE

## 2025-07-15 DIAGNOSIS — I10 PRIMARY HYPERTENSION: ICD-10-CM

## 2025-07-15 RX ORDER — FUROSEMIDE 20 MG/1
40 TABLET ORAL DAILY
Qty: 60 TABLET | Refills: 2 | Status: SHIPPED | OUTPATIENT
Start: 2025-07-15 | End: 2025-07-16 | Stop reason: SDUPTHER

## 2025-07-15 NOTE — TELEPHONE ENCOUNTER
Patient states that she has an appt with PCP tomorrow and is wondering if you would be able to help with her latha while she is here? Pt states that she is having issues with it.

## 2025-07-16 ENCOUNTER — OFFICE VISIT (OUTPATIENT)
Dept: PRIMARY CARE | Facility: CLINIC | Age: 75
End: 2025-07-16
Payer: MEDICARE

## 2025-07-16 ENCOUNTER — ANCILLARY PROCEDURE (OUTPATIENT)
Facility: CLINIC | Age: 75
End: 2025-07-16
Payer: MEDICARE

## 2025-07-16 VITALS
OXYGEN SATURATION: 98 % | DIASTOLIC BLOOD PRESSURE: 62 MMHG | SYSTOLIC BLOOD PRESSURE: 110 MMHG | WEIGHT: 212 LBS | HEART RATE: 73 BPM | BODY MASS INDEX: 37.56 KG/M2 | HEIGHT: 63 IN | TEMPERATURE: 97.2 F

## 2025-07-16 DIAGNOSIS — I10 PRIMARY HYPERTENSION: ICD-10-CM

## 2025-07-16 DIAGNOSIS — E11.22 TYPE 2 DIABETES MELLITUS WITH STAGE 3B CHRONIC KIDNEY DISEASE, WITH LONG-TERM CURRENT USE OF INSULIN (MULTI): Primary | ICD-10-CM

## 2025-07-16 DIAGNOSIS — K21.9 GASTROESOPHAGEAL REFLUX DISEASE WITHOUT ESOPHAGITIS: ICD-10-CM

## 2025-07-16 DIAGNOSIS — N32.81 OAB (OVERACTIVE BLADDER): ICD-10-CM

## 2025-07-16 DIAGNOSIS — M19.91 PRIMARY OSTEOARTHRITIS, UNSPECIFIED SITE: ICD-10-CM

## 2025-07-16 DIAGNOSIS — N18.32 STAGE 3B CHRONIC KIDNEY DISEASE (MULTI): ICD-10-CM

## 2025-07-16 DIAGNOSIS — E11.42 DIABETIC PERIPHERAL NEUROPATHY (MULTI): ICD-10-CM

## 2025-07-16 DIAGNOSIS — N18.32 TYPE 2 DIABETES MELLITUS WITH STAGE 3B CHRONIC KIDNEY DISEASE, WITH LONG-TERM CURRENT USE OF INSULIN (MULTI): Primary | ICD-10-CM

## 2025-07-16 DIAGNOSIS — Z95.9 CARDIAC DEVICE IN SITU: ICD-10-CM

## 2025-07-16 DIAGNOSIS — E53.8 VITAMIN B12 DEFICIENCY: ICD-10-CM

## 2025-07-16 DIAGNOSIS — Z79.4 TYPE 2 DIABETES MELLITUS WITH STAGE 3B CHRONIC KIDNEY DISEASE, WITH LONG-TERM CURRENT USE OF INSULIN (MULTI): Primary | ICD-10-CM

## 2025-07-16 DIAGNOSIS — R73.9 HYPERGLYCEMIA: ICD-10-CM

## 2025-07-16 DIAGNOSIS — D50.9 IRON DEFICIENCY ANEMIA, UNSPECIFIED IRON DEFICIENCY ANEMIA TYPE: ICD-10-CM

## 2025-07-16 DIAGNOSIS — I48.91 ATRIAL FIBRILLATION, UNSPECIFIED TYPE (MULTI): ICD-10-CM

## 2025-07-16 DIAGNOSIS — F32.A DEPRESSION, UNSPECIFIED DEPRESSION TYPE: ICD-10-CM

## 2025-07-16 DIAGNOSIS — R55 SYNCOPE, UNSPECIFIED SYNCOPE TYPE: ICD-10-CM

## 2025-07-16 PROCEDURE — 1126F AMNT PAIN NOTED NONE PRSNT: CPT | Performed by: INTERNAL MEDICINE

## 2025-07-16 PROCEDURE — 1160F RVW MEDS BY RX/DR IN RCRD: CPT | Performed by: INTERNAL MEDICINE

## 2025-07-16 PROCEDURE — 95251 CONT GLUC MNTR ANALYSIS I&R: CPT | Performed by: INTERNAL MEDICINE

## 2025-07-16 PROCEDURE — 1159F MED LIST DOCD IN RCRD: CPT | Performed by: INTERNAL MEDICINE

## 2025-07-16 PROCEDURE — 99214 OFFICE O/P EST MOD 30 MIN: CPT | Performed by: INTERNAL MEDICINE

## 2025-07-16 PROCEDURE — 3008F BODY MASS INDEX DOCD: CPT | Performed by: INTERNAL MEDICINE

## 2025-07-16 PROCEDURE — 3078F DIAST BP <80 MM HG: CPT | Performed by: INTERNAL MEDICINE

## 2025-07-16 PROCEDURE — 3051F HG A1C>EQUAL 7.0%<8.0%: CPT | Performed by: INTERNAL MEDICINE

## 2025-07-16 PROCEDURE — 4010F ACE/ARB THERAPY RXD/TAKEN: CPT | Performed by: INTERNAL MEDICINE

## 2025-07-16 PROCEDURE — 1157F ADVNC CARE PLAN IN RCRD: CPT | Performed by: INTERNAL MEDICINE

## 2025-07-16 PROCEDURE — G2211 COMPLEX E/M VISIT ADD ON: HCPCS | Performed by: INTERNAL MEDICINE

## 2025-07-16 PROCEDURE — 3074F SYST BP LT 130 MM HG: CPT | Performed by: INTERNAL MEDICINE

## 2025-07-16 RX ORDER — FUROSEMIDE 20 MG/1
40 TABLET ORAL DAILY
Qty: 60 TABLET | Refills: 2 | Status: SHIPPED | OUTPATIENT
Start: 2025-07-16 | End: 2025-10-14

## 2025-07-16 ASSESSMENT — ENCOUNTER SYMPTOMS
RESPIRATORY NEGATIVE: 1
CARDIOVASCULAR NEGATIVE: 1
GASTROINTESTINAL NEGATIVE: 1

## 2025-07-16 ASSESSMENT — PAIN SCALES - GENERAL: PAINLEVEL_OUTOF10: 0-NO PAIN

## 2025-07-16 NOTE — PATIENT INSTRUCTIONS
It looks like you are doing really well right now.  It looks like most of your numbers have stabilized or improved.    As such lets leave your medications the same.  I will plan on seeing you back in 3 months and I did order a blood test for prior to that appointment.

## 2025-07-16 NOTE — PROGRESS NOTES
[facility]: [facility]  PROGRESS NOTE      Daniela Epstein is a 74 y.o. female that is presenting today for Follow-up.    Assessment/Plan   Diagnoses and all orders for this visit:  Type 2 diabetes mellitus with stage 3b chronic kidney disease, with long-term current use of insulin (Multi)  Primary hypertension  -     Comprehensive Metabolic Panel; Future  Gastroesophageal reflux disease without esophagitis  Primary osteoarthritis, unspecified site  Diabetic peripheral neuropathy (Multi)  Iron deficiency anemia, unspecified iron deficiency anemia type  -     CBC and Auto Differential; Future  -     Iron and TIBC; Future  -     Ferritin; Future  -     Vitamin B12; Future  -     Folate; Future  Vitamin B12 deficiency  Stage 3b chronic kidney disease (Multi)  Atrial fibrillation, unspecified type (Multi)  OAB (overactive bladder)  Hyperglycemia  -     Hemoglobin A1C; Future  Depression, unspecified depression type  Other orders  -     Follow Up In Primary Care - Established  -     Follow Up In Primary Care - Established; Future  We have considered her current situation and her chronic diagnoses.  As part of that assessment we reviewed the recent blood work that was done as well as the CGM report which is available to us.  1.  Diabetes-we are thrilled to see that her hemoglobin A1c has come down from 9.3-7.9 with a GMI of 7.1%.  She should continue her current program  2.  Hypertension-stable on current medicines  3.  Acid reflux-stable.  4.  History of diabetic peripheral neuropathy-patient is seeing a neurologist and is feeling that her symptoms are under good control with the gabapentin.  5.  History of anemia-this seems to be a chronic issue for her mild anemia of chronic disease.  It has not changed significantly since her last labs.  Will check her iron B12 folate again with her October labs.  6.  Chronic kidney disease 3B-her GFR is remaining stable.  She will be having a new nephrologist  appointment within the next month or so.  7.  History of atrial fibrillation-stable  8.  History of overactive bladder stable on her oxybutynin.  Patient is seeing Dr. Anni Kaba  9.  History of depression-patient on numerous medicines but seems to be stable.    Overall we are not going to be making any further adjustments today as her numbers look about as good as they have looked in a long time.  I will plan on seeing her back in 3 months  Subjective   This 74-year-old is here for 2-month follow-up appointment.  She continues to see her numerous specialists as we will note below.  For the most part she is feeling much better than previously and feels like her sugars are coming under better control.  She has been using a latha as a CGM and has found this information has been quite helpful for her.      Review of Systems   Respiratory: Negative.     Cardiovascular: Negative.    Gastrointestinal: Negative.       Objective   Vitals:    07/16/25 1020   BP: 110/62   Pulse: 73   Temp: 36.2 °C (97.2 °F)   SpO2: 98%      Body mass index is 37.55 kg/m².  Physical Exam    Cardiovascular:      Rate and Rhythm: Normal rate and regular rhythm.      Pulses: Normal pulses.      Heart sounds: Normal heart sounds.   Pulmonary:      Effort: Pulmonary effort is normal.      Breath sounds: Normal breath sounds.   Abdominal:      General: Abdomen is flat.      Palpations: Abdomen is soft.     Musculoskeletal:         General: Normal range of motion.     Skin:     General: Skin is warm and dry.     Neurological:      General: No focal deficit present.       Diagnostic Results   Lab Results   Component Value Date    GLUCOSE 139 (H) 07/09/2025    CALCIUM 9.0 07/09/2025     07/09/2025    K 4.7 07/09/2025    CO2 27 07/09/2025     07/09/2025    BUN 28 (H) 07/09/2025    CREATININE 1.68 (H) 07/09/2025     Lab Results   Component Value Date    ALT 7 07/09/2025    AST 12 07/09/2025    GGT 18 09/03/2018    ALKPHOS 145 07/09/2025  "   BILITOT 0.4 07/09/2025     Lab Results   Component Value Date    WBC 6.1 07/09/2025    HGB 10.9 (L) 07/09/2025    HCT 35.7 07/09/2025    MCV 84.4 07/09/2025     07/09/2025     Lab Results   Component Value Date    CHOL 170 07/09/2025    CHOL 193 01/31/2025    CHOL 187 07/23/2024     Lab Results   Component Value Date    HDL 62 07/09/2025    HDL 74 01/31/2025    HDL 73.0 07/23/2024     Lab Results   Component Value Date    LDLCALC 82 07/09/2025    LDLCALC 96 01/31/2025    LDLCALC 88 07/23/2024     Lab Results   Component Value Date    TRIG 160 (H) 07/09/2025    TRIG 133 01/31/2025    TRIG 130 07/23/2024     No components found for: \"CHOLHDL\"  Lab Results   Component Value Date    HGBA1C 7.9 (H) 07/09/2025     Other labs not included in the list above were reviewed either before or during this encounter.    History    Medical History[1]  Surgical History[2]  Family History[3]  Social History     Socioeconomic History    Marital status:      Spouse name: Not on file    Number of children: Not on file    Years of education: Not on file    Highest education level: Not on file   Occupational History    Not on file   Tobacco Use    Smoking status: Never     Passive exposure: Never    Smokeless tobacco: Never   Vaping Use    Vaping status: Never Used   Substance and Sexual Activity    Alcohol use: Not Currently    Drug use: Never    Sexual activity: Defer   Other Topics Concern    Not on file   Social History Narrative    Not on file     Social Drivers of Health     Financial Resource Strain: Not on file   Food Insecurity: Not on file   Transportation Needs: Not on file   Physical Activity: Not on file   Stress: Not on file   Social Connections: Not on file   Intimate Partner Violence: Not on file   Housing Stability: Not on file     Allergies[4]  Medications Ordered Prior to Encounter[5]  Immunization History   Administered Date(s) Administered    COVID-19, mRNA, LNP-S, PF, 30 mcg/0.3 mL dose 03/31/2021, " 04/24/2021, 11/04/2021    DT (pediatric) 05/29/2007    Influenza, seasonal, injectable 10/29/2010, 11/25/2011    Pneumococcal conjugate vaccine, 13-valent (PREVNAR 13) 05/23/2018    Pneumococcal conjugate vaccine, 20-valent (PREVNAR 20) 07/12/2023    Pneumococcal polysaccharide vaccine, 23-valent, age 2 years and older (PNEUMOVAX 23) 10/29/2006    Tdap vaccine, age 7 year and older (BOOSTRIX, ADACEL) 05/23/2018     Patient's medical history was reviewed and updated either before or during this encounter.       Pablo Montoya MD       [1]   Past Medical History:  Diagnosis Date    Abdominal pain 06/07/2024    Acoustic neuroma (Multi) 08/30/2023    Acute lower urinary tract infection 08/30/2023    Acute otitis externa 06/07/2024    Acute renal failure 03/27/2023    Acute urinary tract infection 03/27/2023    Affective psychosis, bipolar (Multi) 08/30/2023    Age-related nuclear cataract, left eye 10/16/2015    Age-related nuclear cataract of left eye    Age-related nuclear cataract, right eye 10/16/2015    Age-related nuclear cataract of right eye    Allergic     Anemia     Anxiety     Anxiety disorder 08/30/2023    Arthritis     Bilateral dry eyes 08/30/2023    Bipolar disorder 08/30/2023    Breast lump 08/30/2023    Cardiac device in situ 08/30/2023    Cerebrovascular accident (CVA) (Multi) 06/07/2024    Cerebrovascular accident (Multi) 08/30/2023    Closed fracture of phalanx of great toe 02/08/2023    Constipation 02/17/2022    Dehydration 06/07/2024    Depression 08/30/2023    Depression, unspecified 05/20/2014    Depression    Diabetes mellitus (Multi) 08/30/2023    Diabetes mellitus, type 2 (Multi) 08/30/2023    Dry eye syndrome of unspecified lacrimal gland 06/25/2015    Dry eye syndrome    Dysfunction of right eustachian tube 08/30/2023    Dyslipidemia 08/30/2023    Dysphagia 08/30/2023    Essential (primary) hypertension 12/15/2013    Hypertension    Essential (primary) hypertension 08/30/2023    Falls  10/21/2024    Fibrocystic breast changes 08/30/2023    Fracture of bone adjacent to prosthesis 02/14/2022    Gastroesophageal reflux disease 08/30/2023    Hazy vision 08/30/2023    Hip pain 06/07/2024    History of cardiovascular surgery 02/13/2024    Hx of breast cancer 06/08/2016    Hypokalemia 08/30/2023    Infection due to Escherichia coli 03/27/2023    Knee stiffness 01/16/2023    Mastodynia 08/30/2023    Mental disorder 08/30/2023    Mixed hyperlipidemia 08/30/2023    Myopia, bilateral 10/16/2015    High myopia, both eyes    Nausea & vomiting 08/30/2023    Nose injury 08/30/2023    Obesity 03/27/2023    Oral mucosal lesion 08/30/2023    Osteoarthritis 08/30/2023    Other disorders affecting eyelid function 06/25/2015    Excessive involuntary blinking    Overactive bladder 08/30/2023    Pain in left hand 02/06/2023    Personal history of diseases of the blood and blood-forming organs and certain disorders involving the immune mechanism     History of anemia    Personal history of other diseases of the digestive system     History of esophageal reflux    Personal history of other diseases of the nervous system and sense organs     History of cataract    Personal history of other diseases of urinary system     History of bladder problems    Personal history of other mental and behavioral disorders     History of mental disorder    Post-traumatic headache 02/13/2024    Pure hypercholesterolemia, unspecified 05/20/2014    High cholesterol    Rhinitis 08/30/2023    S/P knee replacement 08/30/2023    Severe myopia 05/07/2015    Snoring     Snoring    Solitary cyst of breast 08/30/2023    Stage 3b chronic kidney disease (Multi) 08/30/2023    Stenosis of lacrimal punctum 05/16/2019    Swelling, mass, or lump on face 08/30/2023    Syncope 08/30/2023    TMJ (temporomandibular joint syndrome) 08/30/2023    Type 2 diabetes mellitus without complications 06/25/2015    Diabetes mellitus    Unspecified injury of left wrist,  hand and finger(s), initial encounter 02/06/2023    Unsteady gait 08/30/2023    Vaginal irritation 05/15/2024    Vitamin D deficiency 08/30/2023    Weakness 03/27/2023   [2]   Past Surgical History:  Procedure Laterality Date    ANKLE SURGERY  1998    injury    APPENDECTOMY  1972    BLEPHAROPLASTY Bilateral 2010    BREAST BIOPSY  2011    BREAST LUMPECTOMY Right 2011    atypical hyperplasia    CARDIAC SURGERY  03/06/2023    implant cardiac event recorder for recurrent syncope/ Dr. Fowler    CARPAL TUNNEL RELEASE Left 2010    Neuroplasty Decompression Median Nerve At Carpal Tunnel    CARPAL TUNNEL RELEASE Right 2010    CHOLECYSTECTOMY  1979    COLONOSCOPY  2007    COLONOSCOPY  2013    COLONOSCOPY  2014    COLONOSCOPY  11/06/2020    COLONOSCOPY  06/2024    CRANIOTOMY Right 2006    acoustic neuroma    CT ANGIO NECK  03/22/2023    CT NECK ANGIO W AND WO IV CONTRAST [facility] CT    CT HEAD ANGIO W AND WO IV CONTRAST  03/22/2023    CT HEAD ANGIO W AND WO IV CONTRAST [facility] CT    DILATION AND CURETTAGE OF UTERUS  1971    DILATION AND CURETTAGE OF UTERUS  2013    ESOPHAGOGASTRODUODENOSCOPY  2007    ESOPHAGOGASTRODUODENOSCOPY  2010    ESOPHAGOGASTRODUODENOSCOPY  2020    EYE SURGERY      FRACTURE SURGERY      HEMORRHOID SURGERY  1990    JOINT REPLACEMENT      KNEE SURGERY Right 2008    scar tissue    MOUTH SURGERY Right 2017    right buccal lesion    MR HEAD ANGIO WO IV CONTRAST  01/18/2015    MR HEAD ANGIO WO IV CONTRAST [facility]    MR HEAD ANGIO WO IV CONTRAST  05/08/2016    MR HEAD ANGIO WO IV CONTRAST [facility]    MR HEAD ANGIO WO IV CONTRAST  02/28/2017    MR HEAD ANGIO WO IV CONTRAST [facility]    MR NECK ANGIO WO IV CONTRAST  01/18/2015    MR NECK ANGIO WO IV CONTRAST [facility]    NASAL SINUS SURGERY  1985    ORIF FEMUR FRACTURE Right 2022    OVARIAN CYST REMOVAL  1972    SEPTOPLASTY  05/20/2014    Septoplasty    TONSILLECTOMY      TOTAL KNEE ARTHROPLASTY Right 2007    TOTAL KNEE  ARTHROPLASTY Left 2011    TUBAL LIGATION  1983    URETHRAL SLING  2013   [3]   Family History  Problem Relation Name Age of Onset    Arthritis Mother Paty     Breast cancer Mother Paty     Diabetes Mother Paty     Hypertension Mother Paty     Heart failure Father Luis     Heart disease Father Luis     Arthritis Other SIBLINGS     Diabetes Other SIBLINGS     Hypertension Other SIBLINGS    [4]   Allergies  Allergen Reactions    Other Other     TARTICK DYSKENESIA    Phenothiazines Hives, Shortness of breath, Other and Unknown     Slurred speech    Erythromycin Hives, Unknown and Rash    Cyclosporine Other     burning    Fish Oil Unknown    Haloperidol Unknown   [5]   Current Outpatient Medications on File Prior to Visit   Medication Sig Dispense Refill    atorvastatin (Lipitor) 40 mg tablet TAKE ONE TABLET BY MOUTH once DAILY 90 tablet 3    Austedo 12 mg tablet TAKE ONE TABLET BY MOUTH ONCE DAILY WITH 6 MG TABLET.      Austedo 6 mg tablet TAKE ONE TABLET BY MOUTH ONCE DAILY WITH 12 MG TABLET.      bacitracin-polymyxin B (Polysporin) ophthalmic ointment Apply 1 Application to left eye if needed.      blood sugar diagnostic strip Inject under the skin once daily.      chlorzoxazone (Parafon Forte) 500 mg tablet take 1 tablet by mouth three times a day as needed for muscle spasm (headache or neck pain)      clobetasol (Temovate) 0.05 % ointment apply 1 application externally once a day      fluocinonide 0.05 % cream Apply topically to affected areas on the legs twice a day as needed flares      furosemide (Lasix) 20 mg tablet Take 2 tablets (40 mg) by mouth once daily. 60 tablet 2    gabapentin (Neurontin) 300 mg capsule Take 1 capsule (300 mg) by mouth. AT BEDTIME      glipiZIDE (Glucotrol) 5 mg tablet Take 2 tablets (10 mg) by mouth 2 times a day. 2 tabs in the am, 2 tablet in the pm 360 tablet 3    insulin aspart (NovoLOG Flexpen U-100 Insulin) 100 unit/mL (3 mL) pen Take 3 units twice daily with meals as  "directed per insulin instructions. 15 mL 1    insulin glargine (Basaglar KwikPen U-100 Insulin) 100 unit/mL (3 mL) pen Inject 12 Units under the skin once daily at bedtime. Take as directed per insulin instructions. 15 mL 3    lamoTRIgine (LaMICtal) 200 mg tablet Take 1 tablet (200 mg) by mouth once daily. ODT?      lisinopril 5 mg tablet Take 1 tablet (5 mg) by mouth once daily. 30 tablet 11    loperamide (Imodium A-D) 2 mg tablet Take 1 tablet (2 mg) by mouth 4 times a day as needed.      lubiprostone (Amitiza) 8 mcg capsule once daily with breakfast.      omeprazole (PriLOSEC) 40 mg DR capsule Take 1 capsule (40 mg) by mouth once daily in the morning. Take before meals. Do not crush or chew. 90 capsule 3    oxyBUTYnin XL (Ditropan-XL) 10 mg 24 hr tablet Take 1 tablet (10 mg) by mouth once daily.      pen needle, diabetic 32 gauge x 5/32\" needle Use daily with insulin injection. 100 each 1    psyllium (Metamucil) 3.4 gram packet Take 1 packet by mouth if needed.      sertraline (Zoloft) 50 mg tablet Take 1 tablet (50 mg) by mouth once daily.      [DISCONTINUED] furosemide (Lasix) 20 mg tablet Take 2 tablets (40 mg) by mouth once daily. 60 tablet 2     No current facility-administered medications on file prior to visit.     "

## 2025-07-18 ENCOUNTER — ANCILLARY PROCEDURE (OUTPATIENT)
Facility: CLINIC | Age: 75
End: 2025-07-18
Payer: MEDICARE

## 2025-07-18 DIAGNOSIS — R55 SYNCOPE AND COLLAPSE: ICD-10-CM

## 2025-07-18 DIAGNOSIS — R55 SYNCOPE AND COLLAPSE: Primary | ICD-10-CM

## 2025-07-18 DIAGNOSIS — Z95.9 CARDIAC DEVICE IN SITU: ICD-10-CM

## 2025-07-18 DIAGNOSIS — I48.91 ATRIAL FIBRILLATION, UNSPECIFIED TYPE (MULTI): ICD-10-CM

## 2025-07-21 ENCOUNTER — TELEPHONE (OUTPATIENT)
Facility: CLINIC | Age: 75
End: 2025-07-21
Payer: MEDICARE

## 2025-07-28 ENCOUNTER — ANCILLARY PROCEDURE (OUTPATIENT)
Facility: CLINIC | Age: 75
End: 2025-07-28
Payer: MEDICARE

## 2025-07-28 DIAGNOSIS — R55 SYNCOPE, UNSPECIFIED SYNCOPE TYPE: ICD-10-CM

## 2025-07-28 DIAGNOSIS — Z95.9 CARDIAC DEVICE IN SITU: ICD-10-CM

## 2025-08-01 ENCOUNTER — ANCILLARY PROCEDURE (OUTPATIENT)
Facility: CLINIC | Age: 75
End: 2025-08-01
Payer: MEDICARE

## 2025-08-01 DIAGNOSIS — R55 SYNCOPE, UNSPECIFIED SYNCOPE TYPE: ICD-10-CM

## 2025-08-01 DIAGNOSIS — Z95.9 CARDIAC DEVICE IN SITU: ICD-10-CM

## 2025-08-04 ENCOUNTER — ANCILLARY PROCEDURE (OUTPATIENT)
Facility: CLINIC | Age: 75
End: 2025-08-04
Payer: MEDICARE

## 2025-08-04 DIAGNOSIS — I48.91 ATRIAL FIBRILLATION, UNSPECIFIED TYPE (MULTI): ICD-10-CM

## 2025-08-04 DIAGNOSIS — Z95.9 CARDIAC DEVICE IN SITU: ICD-10-CM

## 2025-08-04 DIAGNOSIS — Z95.9 CARDIAC DEVICE IN SITU: Primary | ICD-10-CM

## 2025-08-09 DIAGNOSIS — Z79.4 TYPE 2 DIABETES MELLITUS WITHOUT COMPLICATION, WITH LONG-TERM CURRENT USE OF INSULIN: ICD-10-CM

## 2025-08-09 DIAGNOSIS — E11.9 TYPE 2 DIABETES MELLITUS WITHOUT COMPLICATION, WITH LONG-TERM CURRENT USE OF INSULIN: ICD-10-CM

## 2025-08-11 RX ORDER — INSULIN GLARGINE 100 [IU]/ML
12 INJECTION, SOLUTION SUBCUTANEOUS NIGHTLY
Qty: 15 ML | Refills: 3 | OUTPATIENT
Start: 2025-08-11

## 2025-08-13 ENCOUNTER — ANCILLARY PROCEDURE (OUTPATIENT)
Facility: CLINIC | Age: 75
End: 2025-08-13
Payer: MEDICARE

## 2025-08-13 DIAGNOSIS — R55 SYNCOPE, UNSPECIFIED SYNCOPE TYPE: ICD-10-CM

## 2025-08-13 DIAGNOSIS — Z95.9 CARDIAC DEVICE IN SITU: ICD-10-CM

## 2025-08-15 ENCOUNTER — APPOINTMENT (OUTPATIENT)
Dept: CARDIOLOGY | Facility: HOSPITAL | Age: 75
DRG: 177 | End: 2025-08-15
Payer: MEDICARE

## 2025-08-15 ENCOUNTER — APPOINTMENT (OUTPATIENT)
Dept: RADIOLOGY | Facility: HOSPITAL | Age: 75
DRG: 177 | End: 2025-08-15
Payer: MEDICARE

## 2025-08-15 ENCOUNTER — TELEPHONE (OUTPATIENT)
Dept: PRIMARY CARE | Facility: CLINIC | Age: 75
End: 2025-08-15

## 2025-08-15 ENCOUNTER — HOSPITAL ENCOUNTER (INPATIENT)
Facility: HOSPITAL | Age: 75
DRG: 177 | End: 2025-08-15
Admitting: STUDENT IN AN ORGANIZED HEALTH CARE EDUCATION/TRAINING PROGRAM
Payer: MEDICARE

## 2025-08-15 DIAGNOSIS — R19.7 DIARRHEA, UNSPECIFIED TYPE: ICD-10-CM

## 2025-08-15 DIAGNOSIS — R06.2 WHEEZING: ICD-10-CM

## 2025-08-15 DIAGNOSIS — N17.9 ACUTE KIDNEY INJURY SUPERIMPOSED ON CHRONIC KIDNEY DISEASE: ICD-10-CM

## 2025-08-15 DIAGNOSIS — R93.89 ABNORMAL MRI: ICD-10-CM

## 2025-08-15 DIAGNOSIS — E11.9 TYPE 2 DIABETES MELLITUS WITHOUT COMPLICATION, WITH LONG-TERM CURRENT USE OF INSULIN: ICD-10-CM

## 2025-08-15 DIAGNOSIS — J18.9 PNEUMONIA DUE TO INFECTIOUS ORGANISM, UNSPECIFIED LATERALITY, UNSPECIFIED PART OF LUNG: ICD-10-CM

## 2025-08-15 DIAGNOSIS — R53.1 GENERALIZED WEAKNESS: ICD-10-CM

## 2025-08-15 DIAGNOSIS — E87.6 HYPOKALEMIA: ICD-10-CM

## 2025-08-15 DIAGNOSIS — S32.9XXA FRACTURE OF UNSPECIFIED PARTS OF LUMBOSACRAL SPINE AND PELVIS, INITIAL ENCOUNTER FOR CLOSED FRACTURE (MULTI): ICD-10-CM

## 2025-08-15 DIAGNOSIS — K21.9 GASTROESOPHAGEAL REFLUX DISEASE WITHOUT ESOPHAGITIS: ICD-10-CM

## 2025-08-15 DIAGNOSIS — Z79.4 TYPE 2 DIABETES MELLITUS WITHOUT COMPLICATION, WITH LONG-TERM CURRENT USE OF INSULIN: ICD-10-CM

## 2025-08-15 DIAGNOSIS — N18.9 ACUTE KIDNEY INJURY SUPERIMPOSED ON CHRONIC KIDNEY DISEASE: ICD-10-CM

## 2025-08-15 DIAGNOSIS — U07.1 COVID-19: Primary | ICD-10-CM

## 2025-08-15 DIAGNOSIS — D72.819 LEUKOPENIA, UNSPECIFIED TYPE: ICD-10-CM

## 2025-08-15 LAB
ALBUMIN SERPL BCP-MCNC: 3.9 G/DL (ref 3.4–5)
ALP SERPL-CCNC: 117 U/L (ref 33–136)
ALT SERPL W P-5'-P-CCNC: 7 U/L (ref 7–45)
ANION GAP BLDV CALCULATED.4IONS-SCNC: 12 MMOL/L (ref 10–25)
ANION GAP SERPL CALCULATED.3IONS-SCNC: 14 MMOL/L (ref 10–20)
APPEARANCE UR: CLEAR
AST SERPL W P-5'-P-CCNC: 15 U/L (ref 9–39)
BASE EXCESS BLDV CALC-SCNC: -2.7 MMOL/L (ref -2–3)
BASOPHILS # BLD AUTO: 0.07 X10*3/UL (ref 0–0.1)
BASOPHILS NFR BLD AUTO: 1.6 %
BILIRUB SERPL-MCNC: 0.3 MG/DL (ref 0–1.2)
BILIRUB UR STRIP.AUTO-MCNC: NEGATIVE MG/DL
BNP SERPL-MCNC: 31 PG/ML (ref 0–99)
BODY TEMPERATURE: 37 DEGREES CELSIUS
BUN SERPL-MCNC: 30 MG/DL (ref 6–23)
CA-I BLDV-SCNC: 1.12 MMOL/L (ref 1.1–1.33)
CALCIUM SERPL-MCNC: 8.4 MG/DL (ref 8.6–10.3)
CARDIAC TROPONIN I PNL SERPL HS: 10 NG/L (ref 0–13)
CARDIAC TROPONIN I PNL SERPL HS: 11 NG/L (ref 0–13)
CHLORIDE BLDV-SCNC: 99 MMOL/L (ref 98–107)
CHLORIDE SERPL-SCNC: 100 MMOL/L (ref 98–107)
CO2 SERPL-SCNC: 21 MMOL/L (ref 21–32)
COLOR UR: COLORLESS
CREAT SERPL-MCNC: 2.03 MG/DL (ref 0.5–1.05)
D DIMER PPP FEU-MCNC: 1.15 MG/L FEU (ref 0.19–0.5)
EGFRCR SERPLBLD CKD-EPI 2021: 25 ML/MIN/1.73M*2
EOSINOPHIL # BLD AUTO: 0.01 X10*3/UL (ref 0–0.4)
EOSINOPHIL NFR BLD AUTO: 0.2 %
ERYTHROCYTE [DISTWIDTH] IN BLOOD BY AUTOMATED COUNT: 14.1 % (ref 11.5–14.5)
FLUAV RNA RESP QL NAA+PROBE: NOT DETECTED
FLUBV RNA RESP QL NAA+PROBE: NOT DETECTED
GLUCOSE BLD MANUAL STRIP-MCNC: 155 MG/DL (ref 74–99)
GLUCOSE BLDV-MCNC: 153 MG/DL (ref 74–99)
GLUCOSE SERPL-MCNC: 144 MG/DL (ref 74–99)
GLUCOSE UR STRIP.AUTO-MCNC: NORMAL MG/DL
HCO3 BLDV-SCNC: 22.6 MMOL/L (ref 22–26)
HCT VFR BLD AUTO: 35.2 % (ref 36–46)
HCT VFR BLD EST: 35 % (ref 36–46)
HGB BLD-MCNC: 11.2 G/DL (ref 12–16)
HGB BLDV-MCNC: 11.7 G/DL (ref 12–16)
IMM GRANULOCYTES # BLD AUTO: 0.04 X10*3/UL (ref 0–0.5)
IMM GRANULOCYTES NFR BLD AUTO: 0.9 % (ref 0–0.9)
INHALED O2 CONCENTRATION: 21 %
KETONES UR STRIP.AUTO-MCNC: NEGATIVE MG/DL
LACTATE BLDV-SCNC: 1.2 MMOL/L (ref 0.4–2)
LACTATE SERPL-SCNC: 1.1 MMOL/L (ref 0.4–2)
LEUKOCYTE ESTERASE UR QL STRIP.AUTO: NEGATIVE
LIPASE SERPL-CCNC: 23 U/L (ref 9–82)
LYMPHOCYTES # BLD AUTO: 0.97 X10*3/UL (ref 0.8–3)
LYMPHOCYTES NFR BLD AUTO: 22.5 %
MAGNESIUM SERPL-MCNC: 1.96 MG/DL (ref 1.6–2.4)
MCH RBC QN AUTO: 25.8 PG (ref 26–34)
MCHC RBC AUTO-ENTMCNC: 31.8 G/DL (ref 32–36)
MCV RBC AUTO: 81 FL (ref 80–100)
MONOCYTES # BLD AUTO: 0.85 X10*3/UL (ref 0.05–0.8)
MONOCYTES NFR BLD AUTO: 19.7 %
NEUTROPHILS # BLD AUTO: 2.37 X10*3/UL (ref 1.6–5.5)
NEUTROPHILS NFR BLD AUTO: 55.1 %
NITRITE UR QL STRIP.AUTO: NEGATIVE
NRBC BLD-RTO: 0 /100 WBCS (ref 0–0)
OXYHGB MFR BLDV: 73.1 % (ref 45–75)
PCO2 BLDV: 40 MM HG (ref 41–51)
PH BLDV: 7.36 PH (ref 7.33–7.43)
PH UR STRIP.AUTO: 5 [PH]
PLATELET # BLD AUTO: 221 X10*3/UL (ref 150–450)
PO2 BLDV: 44 MM HG (ref 35–45)
POTASSIUM BLDV-SCNC: 3.6 MMOL/L (ref 3.5–5.3)
POTASSIUM SERPL-SCNC: 3.4 MMOL/L (ref 3.5–5.3)
PROT SERPL-MCNC: 7.4 G/DL (ref 6.4–8.2)
PROT UR STRIP.AUTO-MCNC: NEGATIVE MG/DL
RBC # BLD AUTO: 4.34 X10*6/UL (ref 4–5.2)
RBC # UR STRIP.AUTO: NEGATIVE MG/DL
SAO2 % BLDV: 75 % (ref 45–75)
SARS-COV-2 RNA RESP QL NAA+PROBE: DETECTED
SODIUM BLDV-SCNC: 130 MMOL/L (ref 136–145)
SODIUM SERPL-SCNC: 132 MMOL/L (ref 136–145)
SP GR UR STRIP.AUTO: 1
UROBILINOGEN UR STRIP.AUTO-MCNC: NORMAL MG/DL
WBC # BLD AUTO: 4.3 X10*3/UL (ref 4.4–11.3)

## 2025-08-15 PROCEDURE — 71250 CT THORAX DX C-: CPT | Performed by: STUDENT IN AN ORGANIZED HEALTH CARE EDUCATION/TRAINING PROGRAM

## 2025-08-15 PROCEDURE — 87040 BLOOD CULTURE FOR BACTERIA: CPT | Mod: TRILAB | Performed by: CLINICAL NURSE SPECIALIST

## 2025-08-15 PROCEDURE — 1100000001 HC PRIVATE ROOM DAILY

## 2025-08-15 PROCEDURE — 72148 MRI LUMBAR SPINE W/O DYE: CPT | Performed by: STUDENT IN AN ORGANIZED HEALTH CARE EDUCATION/TRAINING PROGRAM

## 2025-08-15 PROCEDURE — 81003 URINALYSIS AUTO W/O SCOPE: CPT | Performed by: CLINICAL NURSE SPECIALIST

## 2025-08-15 PROCEDURE — 83880 ASSAY OF NATRIURETIC PEPTIDE: CPT | Performed by: CLINICAL NURSE SPECIALIST

## 2025-08-15 PROCEDURE — 36415 COLL VENOUS BLD VENIPUNCTURE: CPT | Performed by: CLINICAL NURSE SPECIALIST

## 2025-08-15 PROCEDURE — 99223 1ST HOSP IP/OBS HIGH 75: CPT | Performed by: NURSE PRACTITIONER

## 2025-08-15 PROCEDURE — 84132 ASSAY OF SERUM POTASSIUM: CPT | Performed by: CLINICAL NURSE SPECIALIST

## 2025-08-15 PROCEDURE — 72148 MRI LUMBAR SPINE W/O DYE: CPT

## 2025-08-15 PROCEDURE — 82947 ASSAY GLUCOSE BLOOD QUANT: CPT

## 2025-08-15 PROCEDURE — 96365 THER/PROPH/DIAG IV INF INIT: CPT

## 2025-08-15 PROCEDURE — 96375 TX/PRO/DX INJ NEW DRUG ADDON: CPT

## 2025-08-15 PROCEDURE — 87636 SARSCOV2 & INF A&B AMP PRB: CPT | Performed by: CLINICAL NURSE SPECIALIST

## 2025-08-15 PROCEDURE — 2500000004 HC RX 250 GENERAL PHARMACY W/ HCPCS (ALT 636 FOR OP/ED): Performed by: CLINICAL NURSE SPECIALIST

## 2025-08-15 PROCEDURE — 2500000001 HC RX 250 WO HCPCS SELF ADMINISTERED DRUGS (ALT 637 FOR MEDICARE OP): Performed by: NURSE PRACTITIONER

## 2025-08-15 PROCEDURE — 85300 ANTITHROMBIN III ACTIVITY: CPT | Performed by: CLINICAL NURSE SPECIALIST

## 2025-08-15 PROCEDURE — 85025 COMPLETE CBC W/AUTO DIFF WBC: CPT | Performed by: CLINICAL NURSE SPECIALIST

## 2025-08-15 PROCEDURE — 51798 US URINE CAPACITY MEASURE: CPT

## 2025-08-15 PROCEDURE — 2500000002 HC RX 250 W HCPCS SELF ADMINISTERED DRUGS (ALT 637 FOR MEDICARE OP, ALT 636 FOR OP/ED): Performed by: CLINICAL NURSE SPECIALIST

## 2025-08-15 PROCEDURE — 99285 EMERGENCY DEPT VISIT HI MDM: CPT | Mod: 25

## 2025-08-15 PROCEDURE — 74176 CT ABD & PELVIS W/O CONTRAST: CPT | Performed by: STUDENT IN AN ORGANIZED HEALTH CARE EDUCATION/TRAINING PROGRAM

## 2025-08-15 PROCEDURE — 84484 ASSAY OF TROPONIN QUANT: CPT | Performed by: CLINICAL NURSE SPECIALIST

## 2025-08-15 PROCEDURE — 83690 ASSAY OF LIPASE: CPT | Performed by: CLINICAL NURSE SPECIALIST

## 2025-08-15 PROCEDURE — 83605 ASSAY OF LACTIC ACID: CPT | Performed by: CLINICAL NURSE SPECIALIST

## 2025-08-15 PROCEDURE — 83735 ASSAY OF MAGNESIUM: CPT | Performed by: CLINICAL NURSE SPECIALIST

## 2025-08-15 PROCEDURE — 2500000004 HC RX 250 GENERAL PHARMACY W/ HCPCS (ALT 636 FOR OP/ED): Performed by: NURSE PRACTITIONER

## 2025-08-15 PROCEDURE — 2500000002 HC RX 250 W HCPCS SELF ADMINISTERED DRUGS (ALT 637 FOR MEDICARE OP, ALT 636 FOR OP/ED): Performed by: NURSE PRACTITIONER

## 2025-08-15 PROCEDURE — 93005 ELECTROCARDIOGRAM TRACING: CPT

## 2025-08-15 PROCEDURE — 74176 CT ABD & PELVIS W/O CONTRAST: CPT

## 2025-08-15 RX ORDER — LAMOTRIGINE 100 MG/1
200 TABLET ORAL DAILY
Status: DISCONTINUED | OUTPATIENT
Start: 2025-08-16 | End: 2025-08-18 | Stop reason: HOSPADM

## 2025-08-15 RX ORDER — ATORVASTATIN CALCIUM 40 MG/1
40 TABLET, FILM COATED ORAL DAILY
Status: DISCONTINUED | OUTPATIENT
Start: 2025-08-16 | End: 2025-08-18 | Stop reason: HOSPADM

## 2025-08-15 RX ORDER — CEFTRIAXONE 1 G/50ML
1 INJECTION, SOLUTION INTRAVENOUS ONCE
Status: COMPLETED | OUTPATIENT
Start: 2025-08-15 | End: 2025-08-15

## 2025-08-15 RX ORDER — GABAPENTIN 300 MG/1
300 CAPSULE ORAL NIGHTLY
Status: DISCONTINUED | OUTPATIENT
Start: 2025-08-15 | End: 2025-08-16

## 2025-08-15 RX ORDER — ACETAMINOPHEN 650 MG/1
650 SUPPOSITORY RECTAL EVERY 4 HOURS PRN
Status: DISCONTINUED | OUTPATIENT
Start: 2025-08-15 | End: 2025-08-18 | Stop reason: HOSPADM

## 2025-08-15 RX ORDER — DEXAMETHASONE SODIUM PHOSPHATE 10 MG/ML
6 INJECTION INTRAMUSCULAR; INTRAVENOUS ONCE
Status: COMPLETED | OUTPATIENT
Start: 2025-08-15 | End: 2025-08-15

## 2025-08-15 RX ORDER — POTASSIUM CHLORIDE 20 MEQ/1
40 TABLET, EXTENDED RELEASE ORAL ONCE
Status: COMPLETED | OUTPATIENT
Start: 2025-08-15 | End: 2025-08-15

## 2025-08-15 RX ORDER — FLUTICASONE PROPIONATE 50 MCG
2 SPRAY, SUSPENSION (ML) NASAL DAILY
Status: DISCONTINUED | OUTPATIENT
Start: 2025-08-16 | End: 2025-08-18 | Stop reason: HOSPADM

## 2025-08-15 RX ORDER — SODIUM CHLORIDE 9 MG/ML
75 INJECTION, SOLUTION INTRAVENOUS CONTINUOUS
Status: ACTIVE | OUTPATIENT
Start: 2025-08-15 | End: 2025-08-16

## 2025-08-15 RX ORDER — ACETAMINOPHEN 160 MG/5ML
650 SOLUTION ORAL EVERY 4 HOURS PRN
Status: DISCONTINUED | OUTPATIENT
Start: 2025-08-15 | End: 2025-08-18 | Stop reason: HOSPADM

## 2025-08-15 RX ORDER — INSULIN GLARGINE 100 [IU]/ML
12 INJECTION, SOLUTION SUBCUTANEOUS EVERY 24 HOURS
Status: DISCONTINUED | OUTPATIENT
Start: 2025-08-15 | End: 2025-08-18 | Stop reason: HOSPADM

## 2025-08-15 RX ORDER — HEPARIN SODIUM 5000 [USP'U]/ML
5000 INJECTION, SOLUTION INTRAVENOUS; SUBCUTANEOUS EVERY 8 HOURS
Status: DISCONTINUED | OUTPATIENT
Start: 2025-08-15 | End: 2025-08-18 | Stop reason: HOSPADM

## 2025-08-15 RX ORDER — DEXTROSE 50 % IN WATER (D50W) INTRAVENOUS SYRINGE
12.5
Status: DISCONTINUED | OUTPATIENT
Start: 2025-08-15 | End: 2025-08-18 | Stop reason: HOSPADM

## 2025-08-15 RX ORDER — ACETAMINOPHEN 325 MG/1
650 TABLET ORAL EVERY 4 HOURS PRN
Status: DISCONTINUED | OUTPATIENT
Start: 2025-08-15 | End: 2025-08-18 | Stop reason: HOSPADM

## 2025-08-15 RX ORDER — INSULIN LISPRO 100 [IU]/ML
0-10 INJECTION, SOLUTION INTRAVENOUS; SUBCUTANEOUS
Status: DISCONTINUED | OUTPATIENT
Start: 2025-08-16 | End: 2025-08-17

## 2025-08-15 RX ORDER — PANTOPRAZOLE SODIUM 40 MG/1
40 TABLET, DELAYED RELEASE ORAL
Status: DISCONTINUED | OUTPATIENT
Start: 2025-08-16 | End: 2025-08-18 | Stop reason: HOSPADM

## 2025-08-15 RX ORDER — DEUTETRABENAZINE 30 MG/1
TABLET, FILM COATED, EXTENDED RELEASE ORAL
COMMUNITY

## 2025-08-15 RX ORDER — GLIPIZIDE 10 MG/1
10 TABLET ORAL 2 TIMES DAILY
Status: DISCONTINUED | OUTPATIENT
Start: 2025-08-15 | End: 2025-08-18 | Stop reason: HOSPADM

## 2025-08-15 RX ORDER — ONDANSETRON HYDROCHLORIDE 2 MG/ML
4 INJECTION, SOLUTION INTRAVENOUS EVERY 8 HOURS PRN
Status: DISCONTINUED | OUTPATIENT
Start: 2025-08-15 | End: 2025-08-18 | Stop reason: HOSPADM

## 2025-08-15 RX ORDER — ONDANSETRON 4 MG/1
4 TABLET, ORALLY DISINTEGRATING ORAL EVERY 8 HOURS PRN
Status: DISCONTINUED | OUTPATIENT
Start: 2025-08-15 | End: 2025-08-18 | Stop reason: HOSPADM

## 2025-08-15 RX ORDER — DEXTROSE 50 % IN WATER (D50W) INTRAVENOUS SYRINGE
25
Status: DISCONTINUED | OUTPATIENT
Start: 2025-08-15 | End: 2025-08-18 | Stop reason: HOSPADM

## 2025-08-15 RX ORDER — SERTRALINE HYDROCHLORIDE 50 MG/1
50 TABLET, FILM COATED ORAL DAILY
Status: DISCONTINUED | OUTPATIENT
Start: 2025-08-16 | End: 2025-08-18 | Stop reason: HOSPADM

## 2025-08-15 RX ADMIN — CEFTRIAXONE 1 G: 1 INJECTION, SOLUTION INTRAVENOUS at 19:55

## 2025-08-15 RX ADMIN — HEPARIN SODIUM 5000 UNITS: 5000 INJECTION, SOLUTION INTRAVENOUS; SUBCUTANEOUS at 23:19

## 2025-08-15 RX ADMIN — GABAPENTIN 300 MG: 300 CAPSULE ORAL at 23:19

## 2025-08-15 RX ADMIN — INSULIN GLARGINE 12 UNITS: 100 INJECTION, SOLUTION SUBCUTANEOUS at 23:19

## 2025-08-15 RX ADMIN — SODIUM CHLORIDE 75 ML/HR: 900 INJECTION, SOLUTION INTRAVENOUS at 22:12

## 2025-08-15 RX ADMIN — SODIUM CHLORIDE 1000 ML: 900 INJECTION, SOLUTION INTRAVENOUS at 20:51

## 2025-08-15 RX ADMIN — DEXAMETHASONE SODIUM PHOSPHATE 6 MG: 10 INJECTION INTRAMUSCULAR; INTRAVENOUS at 18:03

## 2025-08-15 RX ADMIN — POTASSIUM CHLORIDE 40 MEQ: 1500 TABLET, EXTENDED RELEASE ORAL at 18:07

## 2025-08-15 ASSESSMENT — HEART SCORE
RISK FACTORS: 1-2 RISK FACTORS
TROPONIN: LESS THAN OR EQUAL TO NORMAL LIMIT
ECG: NORMAL
HISTORY: SLIGHTLY SUSPICIOUS
AGE: 65+
HEART SCORE: 3

## 2025-08-15 ASSESSMENT — PAIN DESCRIPTION - PAIN TYPE: TYPE: ACUTE PAIN

## 2025-08-15 ASSESSMENT — CURB65 SCORE
AGE IS GREATER THAN OR EQUAL TO 65: YES
HAS CONFUSION: NO
SBP LESS THAN 90 OR DBNP LESS THAN 60: NO
RESPIRATORY RATE GREATER THAN OR EQUAL TO 30: NO
CURB65 SCORE: 2
BUN IS GREATER THAN 19 MG/DL: YES

## 2025-08-15 ASSESSMENT — ENCOUNTER SYMPTOMS
ACTIVITY CHANGE: 1
DIZZINESS: 1
APPETITE CHANGE: 1
FATIGUE: 1
COUGH: 1
DIARRHEA: 1

## 2025-08-15 ASSESSMENT — PAIN DESCRIPTION - ORIENTATION: ORIENTATION: RIGHT

## 2025-08-15 ASSESSMENT — PAIN - FUNCTIONAL ASSESSMENT
PAIN_FUNCTIONAL_ASSESSMENT: 0-10

## 2025-08-15 ASSESSMENT — PAIN SCALES - GENERAL
PAINLEVEL_OUTOF10: 5 - MODERATE PAIN
PAINLEVEL_OUTOF10: 0 - NO PAIN

## 2025-08-16 PROBLEM — R51.9 HEADACHE: Status: ACTIVE | Noted: 2025-08-16

## 2025-08-16 LAB
ALBUMIN SERPL BCP-MCNC: 3.7 G/DL (ref 3.4–5)
ALP SERPL-CCNC: 117 U/L (ref 33–136)
ALT SERPL W P-5'-P-CCNC: 8 U/L (ref 7–45)
ANION GAP SERPL CALCULATED.3IONS-SCNC: 14 MMOL/L (ref 10–20)
AST SERPL W P-5'-P-CCNC: 15 U/L (ref 9–39)
BILIRUB SERPL-MCNC: 0.2 MG/DL (ref 0–1.2)
BUN SERPL-MCNC: 32 MG/DL (ref 6–23)
C DIF TOX TCDA+TCDB STL QL NAA+PROBE: NOT DETECTED
CALCIUM SERPL-MCNC: 8.6 MG/DL (ref 8.6–10.3)
CHLORIDE SERPL-SCNC: 106 MMOL/L (ref 98–107)
CO2 SERPL-SCNC: 20 MMOL/L (ref 21–32)
CREAT SERPL-MCNC: 1.76 MG/DL (ref 0.5–1.05)
EGFRCR SERPLBLD CKD-EPI 2021: 30 ML/MIN/1.73M*2
ERYTHROCYTE [DISTWIDTH] IN BLOOD BY AUTOMATED COUNT: 14.1 % (ref 11.5–14.5)
GLUCOSE BLD MANUAL STRIP-MCNC: 128 MG/DL (ref 74–99)
GLUCOSE BLD MANUAL STRIP-MCNC: 133 MG/DL (ref 74–99)
GLUCOSE BLD MANUAL STRIP-MCNC: 137 MG/DL (ref 74–99)
GLUCOSE BLD MANUAL STRIP-MCNC: 79 MG/DL (ref 74–99)
GLUCOSE BLD MANUAL STRIP-MCNC: 81 MG/DL (ref 74–99)
GLUCOSE BLD MANUAL STRIP-MCNC: 81 MG/DL (ref 74–99)
GLUCOSE SERPL-MCNC: 171 MG/DL (ref 74–99)
HCT VFR BLD AUTO: 36.3 % (ref 36–46)
HGB BLD-MCNC: 11.3 G/DL (ref 12–16)
MCH RBC QN AUTO: 25.5 PG (ref 26–34)
MCHC RBC AUTO-ENTMCNC: 31.1 G/DL (ref 32–36)
MCV RBC AUTO: 82 FL (ref 80–100)
NRBC BLD-RTO: 0 /100 WBCS (ref 0–0)
PLATELET # BLD AUTO: 220 X10*3/UL (ref 150–450)
POTASSIUM SERPL-SCNC: 4.4 MMOL/L (ref 3.5–5.3)
PROT SERPL-MCNC: 7.3 G/DL (ref 6.4–8.2)
RBC # BLD AUTO: 4.44 X10*6/UL (ref 4–5.2)
SODIUM SERPL-SCNC: 136 MMOL/L (ref 136–145)
WBC # BLD AUTO: 4.3 X10*3/UL (ref 4.4–11.3)

## 2025-08-16 PROCEDURE — 2500000004 HC RX 250 GENERAL PHARMACY W/ HCPCS (ALT 636 FOR OP/ED): Performed by: INTERNAL MEDICINE

## 2025-08-16 PROCEDURE — 87493 C DIFF AMPLIFIED PROBE: CPT | Performed by: NURSE PRACTITIONER

## 2025-08-16 PROCEDURE — 2500000004 HC RX 250 GENERAL PHARMACY W/ HCPCS (ALT 636 FOR OP/ED): Performed by: NURSE PRACTITIONER

## 2025-08-16 PROCEDURE — 2500000004 HC RX 250 GENERAL PHARMACY W/ HCPCS (ALT 636 FOR OP/ED): Performed by: STUDENT IN AN ORGANIZED HEALTH CARE EDUCATION/TRAINING PROGRAM

## 2025-08-16 PROCEDURE — 2500000001 HC RX 250 WO HCPCS SELF ADMINISTERED DRUGS (ALT 637 FOR MEDICARE OP)

## 2025-08-16 PROCEDURE — 99232 SBSQ HOSP IP/OBS MODERATE 35: CPT | Performed by: INTERNAL MEDICINE

## 2025-08-16 PROCEDURE — 87506 IADNA-DNA/RNA PROBE TQ 6-11: CPT | Mod: TRILAB | Performed by: NURSE PRACTITIONER

## 2025-08-16 PROCEDURE — 2500000001 HC RX 250 WO HCPCS SELF ADMINISTERED DRUGS (ALT 637 FOR MEDICARE OP): Performed by: NURSE PRACTITIONER

## 2025-08-16 PROCEDURE — 85027 COMPLETE CBC AUTOMATED: CPT | Performed by: NURSE PRACTITIONER

## 2025-08-16 PROCEDURE — 36415 COLL VENOUS BLD VENIPUNCTURE: CPT | Performed by: NURSE PRACTITIONER

## 2025-08-16 PROCEDURE — 1100000001 HC PRIVATE ROOM DAILY

## 2025-08-16 PROCEDURE — 82947 ASSAY GLUCOSE BLOOD QUANT: CPT

## 2025-08-16 PROCEDURE — 2500000001 HC RX 250 WO HCPCS SELF ADMINISTERED DRUGS (ALT 637 FOR MEDICARE OP): Performed by: STUDENT IN AN ORGANIZED HEALTH CARE EDUCATION/TRAINING PROGRAM

## 2025-08-16 PROCEDURE — 2500000005 HC RX 250 GENERAL PHARMACY W/O HCPCS: Performed by: INTERNAL MEDICINE

## 2025-08-16 PROCEDURE — 84075 ASSAY ALKALINE PHOSPHATASE: CPT | Performed by: NURSE PRACTITIONER

## 2025-08-16 PROCEDURE — 2500000002 HC RX 250 W HCPCS SELF ADMINISTERED DRUGS (ALT 637 FOR MEDICARE OP, ALT 636 FOR OP/ED): Performed by: NURSE PRACTITIONER

## 2025-08-16 RX ORDER — LIDOCAINE 560 MG/1
1 PATCH PERCUTANEOUS; TOPICAL; TRANSDERMAL DAILY
Status: DISCONTINUED | OUTPATIENT
Start: 2025-08-16 | End: 2025-08-18 | Stop reason: HOSPADM

## 2025-08-16 RX ORDER — GABAPENTIN 300 MG/1
300 CAPSULE ORAL 2 TIMES DAILY
Status: DISCONTINUED | OUTPATIENT
Start: 2025-08-16 | End: 2025-08-16

## 2025-08-16 RX ORDER — GABAPENTIN 300 MG/1
300 CAPSULE ORAL 2 TIMES DAILY
Status: DISCONTINUED | OUTPATIENT
Start: 2025-08-16 | End: 2025-08-18 | Stop reason: HOSPADM

## 2025-08-16 RX ORDER — SODIUM CHLORIDE 9 MG/ML
50 INJECTION, SOLUTION INTRAVENOUS CONTINUOUS
Status: DISCONTINUED | OUTPATIENT
Start: 2025-08-16 | End: 2025-08-17

## 2025-08-16 RX ORDER — GUAIFENESIN 600 MG/1
600 TABLET, EXTENDED RELEASE ORAL 2 TIMES DAILY PRN
Status: DISCONTINUED | OUTPATIENT
Start: 2025-08-16 | End: 2025-08-18 | Stop reason: HOSPADM

## 2025-08-16 RX ORDER — BENZONATATE 100 MG/1
100 CAPSULE ORAL 3 TIMES DAILY PRN
Status: DISCONTINUED | OUTPATIENT
Start: 2025-08-16 | End: 2025-08-18 | Stop reason: HOSPADM

## 2025-08-16 RX ORDER — TRAMADOL HYDROCHLORIDE 50 MG/1
50 TABLET, FILM COATED ORAL EVERY 8 HOURS PRN
Status: DISCONTINUED | OUTPATIENT
Start: 2025-08-16 | End: 2025-08-18 | Stop reason: HOSPADM

## 2025-08-16 RX ADMIN — GLIPIZIDE 10 MG: 10 TABLET ORAL at 21:59

## 2025-08-16 RX ADMIN — GUAIFENESIN 600 MG: 600 TABLET ORAL at 21:59

## 2025-08-16 RX ADMIN — HEPARIN SODIUM 5000 UNITS: 5000 INJECTION, SOLUTION INTRAVENOUS; SUBCUTANEOUS at 06:13

## 2025-08-16 RX ADMIN — LIDOCAINE 4% 1 PATCH: 40 PATCH TOPICAL at 12:25

## 2025-08-16 RX ADMIN — LAMOTRIGINE 200 MG: 100 TABLET ORAL at 09:17

## 2025-08-16 RX ADMIN — SERTRALINE 50 MG: 50 TABLET, FILM COATED ORAL at 09:17

## 2025-08-16 RX ADMIN — GABAPENTIN 300 MG: 300 CAPSULE ORAL at 12:25

## 2025-08-16 RX ADMIN — BENZOCAINE AND MENTHOL 1 LOZENGE: 15; 3.6 LOZENGE ORAL at 21:59

## 2025-08-16 RX ADMIN — INSULIN GLARGINE 12 UNITS: 100 INJECTION, SOLUTION SUBCUTANEOUS at 21:59

## 2025-08-16 RX ADMIN — SODIUM CHLORIDE 50 ML/HR: 900 INJECTION, SOLUTION INTRAVENOUS at 12:24

## 2025-08-16 RX ADMIN — ACETAMINOPHEN 650 MG: 325 TABLET ORAL at 20:17

## 2025-08-16 RX ADMIN — PANTOPRAZOLE SODIUM 40 MG: 40 TABLET, DELAYED RELEASE ORAL at 06:13

## 2025-08-16 RX ADMIN — HEPARIN SODIUM 5000 UNITS: 5000 INJECTION, SOLUTION INTRAVENOUS; SUBCUTANEOUS at 15:28

## 2025-08-16 RX ADMIN — HEPARIN SODIUM 5000 UNITS: 5000 INJECTION, SOLUTION INTRAVENOUS; SUBCUTANEOUS at 21:59

## 2025-08-16 RX ADMIN — GLIPIZIDE 10 MG: 10 TABLET ORAL at 09:17

## 2025-08-16 RX ADMIN — GABAPENTIN 300 MG: 300 CAPSULE ORAL at 21:59

## 2025-08-16 RX ADMIN — ATORVASTATIN CALCIUM 40 MG: 40 TABLET, FILM COATED ORAL at 09:17

## 2025-08-16 RX ADMIN — ACETAMINOPHEN 650 MG: 325 TABLET ORAL at 09:22

## 2025-08-16 SDOH — SOCIAL STABILITY: SOCIAL INSECURITY: WITHIN THE LAST YEAR, HAVE YOU BEEN HUMILIATED OR EMOTIONALLY ABUSED IN OTHER WAYS BY YOUR PARTNER OR EX-PARTNER?: NO

## 2025-08-16 SDOH — ECONOMIC STABILITY: INCOME INSECURITY: IN THE PAST 12 MONTHS HAS THE ELECTRIC, GAS, OIL, OR WATER COMPANY THREATENED TO SHUT OFF SERVICES IN YOUR HOME?: NO

## 2025-08-16 SDOH — SOCIAL STABILITY: SOCIAL INSECURITY: WITHIN THE LAST YEAR, HAVE YOU BEEN AFRAID OF YOUR PARTNER OR EX-PARTNER?: NO

## 2025-08-16 SDOH — ECONOMIC STABILITY: HOUSING INSECURITY: AT ANY TIME IN THE PAST 12 MONTHS, WERE YOU HOMELESS OR LIVING IN A SHELTER (INCLUDING NOW)?: NO

## 2025-08-16 SDOH — SOCIAL STABILITY: SOCIAL INSECURITY
WITHIN THE LAST YEAR, HAVE YOU BEEN KICKED, HIT, SLAPPED, OR OTHERWISE PHYSICALLY HURT BY YOUR PARTNER OR EX-PARTNER?: NO

## 2025-08-16 SDOH — ECONOMIC STABILITY: FOOD INSECURITY: HOW HARD IS IT FOR YOU TO PAY FOR THE VERY BASICS LIKE FOOD, HOUSING, MEDICAL CARE, AND HEATING?: NOT VERY HARD

## 2025-08-16 SDOH — ECONOMIC STABILITY: FOOD INSECURITY: WITHIN THE PAST 12 MONTHS, THE FOOD YOU BOUGHT JUST DIDN'T LAST AND YOU DIDN'T HAVE MONEY TO GET MORE.: NEVER TRUE

## 2025-08-16 SDOH — SOCIAL STABILITY: SOCIAL INSECURITY: DO YOU FEEL UNSAFE GOING BACK TO THE PLACE WHERE YOU ARE LIVING?: NO

## 2025-08-16 SDOH — SOCIAL STABILITY: SOCIAL INSECURITY: HAS ANYONE EVER THREATENED TO HURT YOUR FAMILY OR YOUR PETS?: NO

## 2025-08-16 SDOH — ECONOMIC STABILITY: FOOD INSECURITY: WITHIN THE PAST 12 MONTHS, YOU WORRIED THAT YOUR FOOD WOULD RUN OUT BEFORE YOU GOT THE MONEY TO BUY MORE.: NEVER TRUE

## 2025-08-16 SDOH — SOCIAL STABILITY: SOCIAL INSECURITY: DO YOU FEEL ANYONE HAS EXPLOITED OR TAKEN ADVANTAGE OF YOU FINANCIALLY OR OF YOUR PERSONAL PROPERTY?: NO

## 2025-08-16 SDOH — SOCIAL STABILITY: SOCIAL INSECURITY: ARE YOU OR HAVE YOU BEEN THREATENED OR ABUSED PHYSICALLY, EMOTIONALLY, OR SEXUALLY BY ANYONE?: NO

## 2025-08-16 SDOH — ECONOMIC STABILITY: HOUSING INSECURITY: IN THE LAST 12 MONTHS, WAS THERE A TIME WHEN YOU WERE NOT ABLE TO PAY THE MORTGAGE OR RENT ON TIME?: NO

## 2025-08-16 SDOH — SOCIAL STABILITY: SOCIAL INSECURITY: HAVE YOU HAD ANY THOUGHTS OF HARMING ANYONE ELSE?: NO

## 2025-08-16 SDOH — SOCIAL STABILITY: SOCIAL INSECURITY
WITHIN THE LAST YEAR, HAVE YOU BEEN RAPED OR FORCED TO HAVE ANY KIND OF SEXUAL ACTIVITY BY YOUR PARTNER OR EX-PARTNER?: NO

## 2025-08-16 SDOH — SOCIAL STABILITY: SOCIAL INSECURITY: HAVE YOU HAD THOUGHTS OF HARMING ANYONE ELSE?: NO

## 2025-08-16 SDOH — ECONOMIC STABILITY: TRANSPORTATION INSECURITY: IN THE PAST 12 MONTHS, HAS LACK OF TRANSPORTATION KEPT YOU FROM MEDICAL APPOINTMENTS OR FROM GETTING MEDICATIONS?: NO

## 2025-08-16 SDOH — SOCIAL STABILITY: SOCIAL INSECURITY: WERE YOU ABLE TO COMPLETE ALL THE BEHAVIORAL HEALTH SCREENINGS?: YES

## 2025-08-16 SDOH — SOCIAL STABILITY: SOCIAL INSECURITY: ARE THERE ANY APPARENT SIGNS OF INJURIES/BEHAVIORS THAT COULD BE RELATED TO ABUSE/NEGLECT?: NO

## 2025-08-16 SDOH — SOCIAL STABILITY: SOCIAL INSECURITY: ABUSE: ADULT

## 2025-08-16 SDOH — SOCIAL STABILITY: SOCIAL INSECURITY: DOES ANYONE TRY TO KEEP YOU FROM HAVING/CONTACTING OTHER FRIENDS OR DOING THINGS OUTSIDE YOUR HOME?: NO

## 2025-08-16 ASSESSMENT — COGNITIVE AND FUNCTIONAL STATUS - GENERAL
TOILETING: A LITTLE
STANDING UP FROM CHAIR USING ARMS: A LITTLE
MOVING TO AND FROM BED TO CHAIR: A LITTLE
DAILY ACTIVITIY SCORE: 22
PATIENT BASELINE BEDBOUND: NO
STANDING UP FROM CHAIR USING ARMS: A LITTLE
TOILETING: A LITTLE
MOBILITY SCORE: 20
MOBILITY SCORE: 20
WALKING IN HOSPITAL ROOM: A LITTLE
CLIMB 3 TO 5 STEPS WITH RAILING: A LITTLE
MOVING TO AND FROM BED TO CHAIR: A LITTLE
MOBILITY SCORE: 20
CLIMB 3 TO 5 STEPS WITH RAILING: A LITTLE
CLIMB 3 TO 5 STEPS WITH RAILING: A LITTLE
TOILETING: A LITTLE
PERSONAL GROOMING: A LITTLE
STANDING UP FROM CHAIR USING ARMS: A LITTLE
DAILY ACTIVITIY SCORE: 23
MOVING TO AND FROM BED TO CHAIR: A LITTLE
DAILY ACTIVITIY SCORE: 23

## 2025-08-16 ASSESSMENT — ACTIVITIES OF DAILY LIVING (ADL)
LACK_OF_TRANSPORTATION: NO
ASSISTIVE_DEVICE: OTHER (COMMENT)
LACK_OF_TRANSPORTATION: NO
JUDGMENT_ADEQUATE_SAFELY_COMPLETE_DAILY_ACTIVITIES: YES
DRESSING YOURSELF: INDEPENDENT
HEARING - LEFT EAR: HEARING AID
BATHING: INDEPENDENT
PATIENT'S MEMORY ADEQUATE TO SAFELY COMPLETE DAILY ACTIVITIES?: YES
HEARING - RIGHT EAR: HEARING AID
GROOMING: INDEPENDENT
TOILETING: INDEPENDENT
WALKS IN HOME: INDEPENDENT
ADEQUATE_TO_COMPLETE_ADL: YES
FEEDING YOURSELF: INDEPENDENT

## 2025-08-16 ASSESSMENT — PATIENT HEALTH QUESTIONNAIRE - PHQ9
2. FEELING DOWN, DEPRESSED OR HOPELESS: NOT AT ALL
1. LITTLE INTEREST OR PLEASURE IN DOING THINGS: NOT AT ALL
SUM OF ALL RESPONSES TO PHQ9 QUESTIONS 1 & 2: 0

## 2025-08-16 ASSESSMENT — LIFESTYLE VARIABLES
HOW OFTEN DO YOU HAVE 6 OR MORE DRINKS ON ONE OCCASION: NEVER
HOW OFTEN DO YOU HAVE A DRINK CONTAINING ALCOHOL: NEVER
SUBSTANCE_ABUSE_PAST_12_MONTHS: NO
SKIP TO QUESTIONS 9-10: 1
AUDIT-C TOTAL SCORE: 0
HOW MANY STANDARD DRINKS CONTAINING ALCOHOL DO YOU HAVE ON A TYPICAL DAY: PATIENT DOES NOT DRINK
PRESCIPTION_ABUSE_PAST_12_MONTHS: NO
AUDIT-C TOTAL SCORE: 0

## 2025-08-16 ASSESSMENT — PAIN - FUNCTIONAL ASSESSMENT
PAIN_FUNCTIONAL_ASSESSMENT: 0-10
PAIN_FUNCTIONAL_ASSESSMENT: 0-10

## 2025-08-16 ASSESSMENT — PAIN SCALES - GENERAL
PAINLEVEL_OUTOF10: 3
PAINLEVEL_OUTOF10: 1

## 2025-08-17 VITALS
BODY MASS INDEX: 37.56 KG/M2 | DIASTOLIC BLOOD PRESSURE: 82 MMHG | HEART RATE: 70 BPM | TEMPERATURE: 97.9 F | SYSTOLIC BLOOD PRESSURE: 139 MMHG | RESPIRATION RATE: 18 BRPM | HEIGHT: 63 IN | OXYGEN SATURATION: 98 % | WEIGHT: 212 LBS

## 2025-08-17 LAB
ALBUMIN SERPL BCP-MCNC: 3.3 G/DL (ref 3.4–5)
ANION GAP SERPL CALCULATED.3IONS-SCNC: 10 MMOL/L (ref 10–20)
BACTERIA BLD CULT: NORMAL
BACTERIA BLD CULT: NORMAL
BASOPHILS # BLD AUTO: 0.04 X10*3/UL (ref 0–0.1)
BASOPHILS NFR BLD AUTO: 1.2 %
BUN SERPL-MCNC: 31 MG/DL (ref 6–23)
C COLI+JEJ+UPSA DNA STL QL NAA+PROBE: NOT DETECTED
CALCIUM SERPL-MCNC: 8.2 MG/DL (ref 8.6–10.3)
CHLORIDE SERPL-SCNC: 111 MMOL/L (ref 98–107)
CO2 SERPL-SCNC: 23 MMOL/L (ref 21–32)
CREAT SERPL-MCNC: 1.58 MG/DL (ref 0.5–1.05)
EC STX1 GENE STL QL NAA+PROBE: NOT DETECTED
EC STX2 GENE STL QL NAA+PROBE: NOT DETECTED
EGFRCR SERPLBLD CKD-EPI 2021: 34 ML/MIN/1.73M*2
EOSINOPHIL # BLD AUTO: 0.11 X10*3/UL (ref 0–0.4)
EOSINOPHIL NFR BLD AUTO: 3.2 %
ERYTHROCYTE [DISTWIDTH] IN BLOOD BY AUTOMATED COUNT: 14.2 % (ref 11.5–14.5)
GLUCOSE BLD MANUAL STRIP-MCNC: 103 MG/DL (ref 74–99)
GLUCOSE BLD MANUAL STRIP-MCNC: 228 MG/DL (ref 74–99)
GLUCOSE BLD MANUAL STRIP-MCNC: 296 MG/DL (ref 74–99)
GLUCOSE BLD MANUAL STRIP-MCNC: 298 MG/DL (ref 74–99)
GLUCOSE BLD MANUAL STRIP-MCNC: 50 MG/DL (ref 74–99)
GLUCOSE BLD MANUAL STRIP-MCNC: 50 MG/DL (ref 74–99)
GLUCOSE BLD MANUAL STRIP-MCNC: 92 MG/DL (ref 74–99)
GLUCOSE SERPL-MCNC: 79 MG/DL (ref 74–99)
HCT VFR BLD AUTO: 33.7 % (ref 36–46)
HGB BLD-MCNC: 10.5 G/DL (ref 12–16)
IMM GRANULOCYTES # BLD AUTO: 0.02 X10*3/UL (ref 0–0.5)
IMM GRANULOCYTES NFR BLD AUTO: 0.6 % (ref 0–0.9)
LYMPHOCYTES # BLD AUTO: 1.52 X10*3/UL (ref 0.8–3)
LYMPHOCYTES NFR BLD AUTO: 44.8 %
MCH RBC QN AUTO: 25.7 PG (ref 26–34)
MCHC RBC AUTO-ENTMCNC: 31.2 G/DL (ref 32–36)
MCV RBC AUTO: 83 FL (ref 80–100)
MONOCYTES # BLD AUTO: 0.57 X10*3/UL (ref 0.05–0.8)
MONOCYTES NFR BLD AUTO: 16.8 %
NEUTROPHILS # BLD AUTO: 1.13 X10*3/UL (ref 1.6–5.5)
NEUTROPHILS NFR BLD AUTO: 33.4 %
NOROVIRUS GI + GII RNA STL NAA+PROBE: NOT DETECTED
NRBC BLD-RTO: 0 /100 WBCS (ref 0–0)
PHOSPHATE SERPL-MCNC: 4.1 MG/DL (ref 2.5–4.9)
PLATELET # BLD AUTO: 209 X10*3/UL (ref 150–450)
POTASSIUM SERPL-SCNC: 4 MMOL/L (ref 3.5–5.3)
RBC # BLD AUTO: 4.08 X10*6/UL (ref 4–5.2)
RV RNA STL NAA+PROBE: NOT DETECTED
SALMONELLA DNA STL QL NAA+PROBE: NOT DETECTED
SHIGELLA DNA SPEC QL NAA+PROBE: NOT DETECTED
SODIUM SERPL-SCNC: 140 MMOL/L (ref 136–145)
V CHOLERAE DNA STL QL NAA+PROBE: NOT DETECTED
WBC # BLD AUTO: 3.4 X10*3/UL (ref 4.4–11.3)
Y ENTEROCOL DNA STL QL NAA+PROBE: NOT DETECTED

## 2025-08-17 PROCEDURE — 36415 COLL VENOUS BLD VENIPUNCTURE: CPT | Performed by: INTERNAL MEDICINE

## 2025-08-17 PROCEDURE — 2500000001 HC RX 250 WO HCPCS SELF ADMINISTERED DRUGS (ALT 637 FOR MEDICARE OP): Performed by: NURSE PRACTITIONER

## 2025-08-17 PROCEDURE — 82947 ASSAY GLUCOSE BLOOD QUANT: CPT

## 2025-08-17 PROCEDURE — 85025 COMPLETE CBC W/AUTO DIFF WBC: CPT | Performed by: INTERNAL MEDICINE

## 2025-08-17 PROCEDURE — 2500000004 HC RX 250 GENERAL PHARMACY W/ HCPCS (ALT 636 FOR OP/ED): Performed by: STUDENT IN AN ORGANIZED HEALTH CARE EDUCATION/TRAINING PROGRAM

## 2025-08-17 PROCEDURE — 2500000002 HC RX 250 W HCPCS SELF ADMINISTERED DRUGS (ALT 637 FOR MEDICARE OP, ALT 636 FOR OP/ED): Performed by: REGISTERED NURSE

## 2025-08-17 PROCEDURE — 2500000002 HC RX 250 W HCPCS SELF ADMINISTERED DRUGS (ALT 637 FOR MEDICARE OP, ALT 636 FOR OP/ED): Performed by: STUDENT IN AN ORGANIZED HEALTH CARE EDUCATION/TRAINING PROGRAM

## 2025-08-17 PROCEDURE — 97161 PT EVAL LOW COMPLEX 20 MIN: CPT | Mod: GP

## 2025-08-17 PROCEDURE — 1100000001 HC PRIVATE ROOM DAILY

## 2025-08-17 PROCEDURE — 2500000001 HC RX 250 WO HCPCS SELF ADMINISTERED DRUGS (ALT 637 FOR MEDICARE OP): Performed by: INTERNAL MEDICINE

## 2025-08-17 PROCEDURE — 2500000005 HC RX 250 GENERAL PHARMACY W/O HCPCS: Performed by: INTERNAL MEDICINE

## 2025-08-17 PROCEDURE — 80069 RENAL FUNCTION PANEL: CPT | Performed by: INTERNAL MEDICINE

## 2025-08-17 PROCEDURE — 99232 SBSQ HOSP IP/OBS MODERATE 35: CPT | Performed by: STUDENT IN AN ORGANIZED HEALTH CARE EDUCATION/TRAINING PROGRAM

## 2025-08-17 PROCEDURE — 2500000002 HC RX 250 W HCPCS SELF ADMINISTERED DRUGS (ALT 637 FOR MEDICARE OP, ALT 636 FOR OP/ED): Performed by: NURSE PRACTITIONER

## 2025-08-17 PROCEDURE — 2500000004 HC RX 250 GENERAL PHARMACY W/ HCPCS (ALT 636 FOR OP/ED): Performed by: NURSE PRACTITIONER

## 2025-08-17 PROCEDURE — 2500000001 HC RX 250 WO HCPCS SELF ADMINISTERED DRUGS (ALT 637 FOR MEDICARE OP)

## 2025-08-17 RX ORDER — INSULIN LISPRO 100 [IU]/ML
0-10 INJECTION, SOLUTION INTRAVENOUS; SUBCUTANEOUS
Status: DISCONTINUED | OUTPATIENT
Start: 2025-08-17 | End: 2025-08-18 | Stop reason: HOSPADM

## 2025-08-17 RX ORDER — INSULIN LISPRO 100 [IU]/ML
0-10 INJECTION, SOLUTION INTRAVENOUS; SUBCUTANEOUS
Status: DISCONTINUED | OUTPATIENT
Start: 2025-08-17 | End: 2025-08-17

## 2025-08-17 RX ORDER — PREDNISONE 20 MG/1
40 TABLET ORAL DAILY
Status: DISCONTINUED | OUTPATIENT
Start: 2025-08-17 | End: 2025-08-18 | Stop reason: HOSPADM

## 2025-08-17 RX ADMIN — HEPARIN SODIUM 5000 UNITS: 5000 INJECTION, SOLUTION INTRAVENOUS; SUBCUTANEOUS at 14:58

## 2025-08-17 RX ADMIN — GUAIFENESIN 600 MG: 600 TABLET ORAL at 08:53

## 2025-08-17 RX ADMIN — PANTOPRAZOLE SODIUM 40 MG: 40 TABLET, DELAYED RELEASE ORAL at 06:22

## 2025-08-17 RX ADMIN — LAMOTRIGINE 200 MG: 100 TABLET ORAL at 08:45

## 2025-08-17 RX ADMIN — LIDOCAINE 4% 1 PATCH: 40 PATCH TOPICAL at 08:45

## 2025-08-17 RX ADMIN — HEPARIN SODIUM 5000 UNITS: 5000 INJECTION, SOLUTION INTRAVENOUS; SUBCUTANEOUS at 21:33

## 2025-08-17 RX ADMIN — HEPARIN SODIUM 5000 UNITS: 5000 INJECTION, SOLUTION INTRAVENOUS; SUBCUTANEOUS at 06:22

## 2025-08-17 RX ADMIN — INSULIN GLARGINE 12 UNITS: 100 INJECTION, SOLUTION SUBCUTANEOUS at 20:12

## 2025-08-17 RX ADMIN — SERTRALINE 50 MG: 50 TABLET, FILM COATED ORAL at 08:45

## 2025-08-17 RX ADMIN — PREDNISONE 40 MG: 20 TABLET ORAL at 11:24

## 2025-08-17 RX ADMIN — GABAPENTIN 300 MG: 300 CAPSULE ORAL at 08:45

## 2025-08-17 RX ADMIN — FLUTICASONE PROPIONATE 2 SPRAY: 50 SPRAY, METERED NASAL at 08:45

## 2025-08-17 RX ADMIN — ATORVASTATIN CALCIUM 40 MG: 40 TABLET, FILM COATED ORAL at 08:45

## 2025-08-17 RX ADMIN — GABAPENTIN 300 MG: 300 CAPSULE ORAL at 20:12

## 2025-08-17 RX ADMIN — INSULIN LISPRO 4 UNITS: 100 INJECTION, SOLUTION INTRAVENOUS; SUBCUTANEOUS at 16:58

## 2025-08-17 RX ADMIN — INSULIN LISPRO 6 UNITS: 100 INJECTION, SOLUTION INTRAVENOUS; SUBCUTANEOUS at 19:07

## 2025-08-17 RX ADMIN — TRAMADOL HYDROCHLORIDE 50 MG: 50 TABLET, COATED ORAL at 14:58

## 2025-08-17 RX ADMIN — INSULIN LISPRO 6 UNITS: 100 INJECTION, SOLUTION INTRAVENOUS; SUBCUTANEOUS at 20:12

## 2025-08-17 ASSESSMENT — COGNITIVE AND FUNCTIONAL STATUS - GENERAL
DAILY ACTIVITIY SCORE: 22
CLIMB 3 TO 5 STEPS WITH RAILING: A LOT
WALKING IN HOSPITAL ROOM: A LITTLE
MOVING TO AND FROM BED TO CHAIR: A LITTLE
CLIMB 3 TO 5 STEPS WITH RAILING: A LITTLE
TOILETING: A LITTLE
WALKING IN HOSPITAL ROOM: A LITTLE
MOVING TO AND FROM BED TO CHAIR: A LITTLE
STANDING UP FROM CHAIR USING ARMS: A LITTLE
PERSONAL GROOMING: A LITTLE
MOBILITY SCORE: 20
MOBILITY SCORE: 20

## 2025-08-17 ASSESSMENT — ACTIVITIES OF DAILY LIVING (ADL): ADL_ASSISTANCE: INDEPENDENT

## 2025-08-17 ASSESSMENT — PAIN SCALES - GENERAL
PAINLEVEL_OUTOF10: 6
PAINLEVEL_OUTOF10: 0 - NO PAIN
PAINLEVEL_OUTOF10: 4
PAINLEVEL_OUTOF10: 0 - NO PAIN

## 2025-08-17 ASSESSMENT — PAIN - FUNCTIONAL ASSESSMENT
PAIN_FUNCTIONAL_ASSESSMENT: 0-10

## 2025-08-17 ASSESSMENT — PAIN DESCRIPTION - LOCATION: LOCATION: HEAD

## 2025-08-18 ENCOUNTER — PHARMACY VISIT (OUTPATIENT)
Dept: PHARMACY | Facility: CLINIC | Age: 75
End: 2025-08-18
Payer: COMMERCIAL

## 2025-08-18 ENCOUNTER — APPOINTMENT (OUTPATIENT)
Dept: CARDIOLOGY | Facility: HOSPITAL | Age: 75
DRG: 177 | End: 2025-08-18
Payer: MEDICARE

## 2025-08-18 VITALS
RESPIRATION RATE: 19 BRPM | HEIGHT: 63 IN | OXYGEN SATURATION: 97 % | HEART RATE: 85 BPM | DIASTOLIC BLOOD PRESSURE: 77 MMHG | BODY MASS INDEX: 37.56 KG/M2 | TEMPERATURE: 97.7 F | WEIGHT: 212 LBS | SYSTOLIC BLOOD PRESSURE: 153 MMHG

## 2025-08-18 PROBLEM — J18.9 PNEUMONIA DUE TO INFECTIOUS ORGANISM: Status: RESOLVED | Noted: 2025-08-15 | Resolved: 2025-08-18

## 2025-08-18 LAB
ALBUMIN SERPL BCP-MCNC: 3.5 G/DL (ref 3.4–5)
ANION GAP SERPL CALCULATED.3IONS-SCNC: 12 MMOL/L (ref 10–20)
BASOPHILS # BLD MANUAL: 0 X10*3/UL (ref 0–0.1)
BASOPHILS NFR BLD MANUAL: 0 %
BUN SERPL-MCNC: 29 MG/DL (ref 6–23)
BURR CELLS BLD QL SMEAR: NORMAL
CALCIUM SERPL-MCNC: 8.8 MG/DL (ref 8.6–10.3)
CHLORIDE SERPL-SCNC: 108 MMOL/L (ref 98–107)
CO2 SERPL-SCNC: 21 MMOL/L (ref 21–32)
CREAT SERPL-MCNC: 1.45 MG/DL (ref 0.5–1.05)
EGFRCR SERPLBLD CKD-EPI 2021: 38 ML/MIN/1.73M*2
EOSINOPHIL # BLD MANUAL: 0 X10*3/UL (ref 0–0.4)
EOSINOPHIL NFR BLD MANUAL: 0 %
ERYTHROCYTE [DISTWIDTH] IN BLOOD BY AUTOMATED COUNT: 14 % (ref 11.5–14.5)
GLUCOSE BLD MANUAL STRIP-MCNC: 125 MG/DL (ref 74–99)
GLUCOSE BLD MANUAL STRIP-MCNC: 205 MG/DL (ref 74–99)
GLUCOSE SERPL-MCNC: 148 MG/DL (ref 74–99)
HCT VFR BLD AUTO: 32.4 % (ref 36–46)
HGB BLD-MCNC: 10.2 G/DL (ref 12–16)
IMM GRANULOCYTES # BLD AUTO: 0.02 X10*3/UL (ref 0–0.5)
IMM GRANULOCYTES NFR BLD AUTO: 0.5 % (ref 0–0.9)
LYMPHOCYTES # BLD MANUAL: 1.48 X10*3/UL (ref 0.8–3)
LYMPHOCYTES NFR BLD MANUAL: 39 %
MCH RBC QN AUTO: 25.6 PG (ref 26–34)
MCHC RBC AUTO-ENTMCNC: 31.5 G/DL (ref 32–36)
MCV RBC AUTO: 81 FL (ref 80–100)
MONOCYTES # BLD MANUAL: 0.15 X10*3/UL (ref 0.05–0.8)
MONOCYTES NFR BLD MANUAL: 4 %
NEUTS SEG # BLD MANUAL: 2.17 X10*3/UL (ref 1.6–5)
NEUTS SEG NFR BLD MANUAL: 57 %
NRBC BLD-RTO: 0 /100 WBCS (ref 0–0)
OVALOCYTES BLD QL SMEAR: NORMAL
PHOSPHATE SERPL-MCNC: 3.2 MG/DL (ref 2.5–4.9)
PLATELET # BLD AUTO: 223 X10*3/UL (ref 150–450)
POTASSIUM SERPL-SCNC: 4.4 MMOL/L (ref 3.5–5.3)
RBC # BLD AUTO: 3.99 X10*6/UL (ref 4–5.2)
RBC MORPH BLD: NORMAL
SODIUM SERPL-SCNC: 137 MMOL/L (ref 136–145)
TOTAL CELLS COUNTED BLD: 100
WBC # BLD AUTO: 3.8 X10*3/UL (ref 4.4–11.3)

## 2025-08-18 PROCEDURE — 93005 ELECTROCARDIOGRAM TRACING: CPT

## 2025-08-18 PROCEDURE — 99238 HOSP IP/OBS DSCHRG MGMT 30/<: CPT | Performed by: EMERGENCY MEDICINE

## 2025-08-18 PROCEDURE — 2500000004 HC RX 250 GENERAL PHARMACY W/ HCPCS (ALT 636 FOR OP/ED): Performed by: STUDENT IN AN ORGANIZED HEALTH CARE EDUCATION/TRAINING PROGRAM

## 2025-08-18 PROCEDURE — 82947 ASSAY GLUCOSE BLOOD QUANT: CPT

## 2025-08-18 PROCEDURE — 2500000002 HC RX 250 W HCPCS SELF ADMINISTERED DRUGS (ALT 637 FOR MEDICARE OP, ALT 636 FOR OP/ED): Performed by: NURSE PRACTITIONER

## 2025-08-18 PROCEDURE — 2500000002 HC RX 250 W HCPCS SELF ADMINISTERED DRUGS (ALT 637 FOR MEDICARE OP, ALT 636 FOR OP/ED): Performed by: REGISTERED NURSE

## 2025-08-18 PROCEDURE — 36415 COLL VENOUS BLD VENIPUNCTURE: CPT | Performed by: INTERNAL MEDICINE

## 2025-08-18 PROCEDURE — 2500000001 HC RX 250 WO HCPCS SELF ADMINISTERED DRUGS (ALT 637 FOR MEDICARE OP)

## 2025-08-18 PROCEDURE — 2500000001 HC RX 250 WO HCPCS SELF ADMINISTERED DRUGS (ALT 637 FOR MEDICARE OP): Performed by: NURSE PRACTITIONER

## 2025-08-18 PROCEDURE — RXMED WILLOW AMBULATORY MEDICATION CHARGE

## 2025-08-18 PROCEDURE — 80069 RENAL FUNCTION PANEL: CPT | Performed by: INTERNAL MEDICINE

## 2025-08-18 PROCEDURE — 85027 COMPLETE CBC AUTOMATED: CPT | Performed by: INTERNAL MEDICINE

## 2025-08-18 PROCEDURE — 85007 BL SMEAR W/DIFF WBC COUNT: CPT | Performed by: INTERNAL MEDICINE

## 2025-08-18 PROCEDURE — 97165 OT EVAL LOW COMPLEX 30 MIN: CPT | Mod: GO

## 2025-08-18 PROCEDURE — 2500000004 HC RX 250 GENERAL PHARMACY W/ HCPCS (ALT 636 FOR OP/ED): Performed by: NURSE PRACTITIONER

## 2025-08-18 RX ORDER — PREDNISONE 20 MG/1
40 TABLET ORAL DAILY
Qty: 8 TABLET | Refills: 0 | Status: SHIPPED | OUTPATIENT
Start: 2025-08-18 | End: 2025-08-22

## 2025-08-18 RX ORDER — LIDOCAINE 560 MG/1
1 PATCH PERCUTANEOUS; TOPICAL; TRANSDERMAL DAILY
Qty: 10 PATCH | Refills: 0 | Status: SHIPPED | OUTPATIENT
Start: 2025-08-18 | End: 2025-08-28

## 2025-08-18 RX ORDER — PANTOPRAZOLE SODIUM 40 MG/1
40 TABLET, DELAYED RELEASE ORAL
Qty: 30 TABLET | Refills: 0 | Status: SHIPPED | OUTPATIENT
Start: 2025-08-19 | End: 2025-09-18

## 2025-08-18 RX ORDER — ALBUTEROL SULFATE 90 UG/1
2 INHALANT RESPIRATORY (INHALATION) EVERY 4 HOURS PRN
Qty: 8.5 G | Refills: 5 | Status: SHIPPED | OUTPATIENT
Start: 2025-08-18

## 2025-08-18 RX ORDER — INSULIN ASPART 100 [IU]/ML
0-10 INJECTION, SOLUTION INTRAVENOUS; SUBCUTANEOUS
Qty: 15 ML | Refills: 0 | Status: SHIPPED | OUTPATIENT
Start: 2025-08-18

## 2025-08-18 RX ORDER — TRAMADOL HYDROCHLORIDE 50 MG/1
50 TABLET, FILM COATED ORAL EVERY 8 HOURS PRN
Qty: 10 TABLET | Refills: 0 | Status: SHIPPED | OUTPATIENT
Start: 2025-08-18 | End: 2025-08-26

## 2025-08-18 RX ADMIN — INSULIN LISPRO 4 UNITS: 100 INJECTION, SOLUTION INTRAVENOUS; SUBCUTANEOUS at 14:17

## 2025-08-18 RX ADMIN — ATORVASTATIN CALCIUM 40 MG: 40 TABLET, FILM COATED ORAL at 10:04

## 2025-08-18 RX ADMIN — HEPARIN SODIUM 5000 UNITS: 5000 INJECTION, SOLUTION INTRAVENOUS; SUBCUTANEOUS at 06:08

## 2025-08-18 RX ADMIN — LAMOTRIGINE 200 MG: 100 TABLET ORAL at 10:04

## 2025-08-18 RX ADMIN — PANTOPRAZOLE SODIUM 40 MG: 40 TABLET, DELAYED RELEASE ORAL at 06:08

## 2025-08-18 RX ADMIN — GABAPENTIN 300 MG: 300 CAPSULE ORAL at 10:04

## 2025-08-18 RX ADMIN — SERTRALINE 50 MG: 50 TABLET, FILM COATED ORAL at 10:04

## 2025-08-18 RX ADMIN — PREDNISONE 40 MG: 20 TABLET ORAL at 10:04

## 2025-08-18 ASSESSMENT — COGNITIVE AND FUNCTIONAL STATUS - GENERAL
TOILETING: A LITTLE
MOBILITY SCORE: 20
HELP NEEDED FOR BATHING: A LITTLE
STANDING UP FROM CHAIR USING ARMS: A LITTLE
MOVING TO AND FROM BED TO CHAIR: A LITTLE
DRESSING REGULAR LOWER BODY CLOTHING: A LITTLE
DRESSING REGULAR UPPER BODY CLOTHING: A LITTLE
DAILY ACTIVITIY SCORE: 22
WALKING IN HOSPITAL ROOM: A LITTLE
CLIMB 3 TO 5 STEPS WITH RAILING: A LITTLE
PERSONAL GROOMING: A LITTLE
DAILY ACTIVITIY SCORE: 19
PERSONAL GROOMING: A LITTLE
TOILETING: A LITTLE

## 2025-08-18 ASSESSMENT — PAIN - FUNCTIONAL ASSESSMENT: PAIN_FUNCTIONAL_ASSESSMENT: 0-10

## 2025-08-18 ASSESSMENT — ACTIVITIES OF DAILY LIVING (ADL)
BATHING_ASSISTANCE: STAND BY
ADL_ASSISTANCE: INDEPENDENT
LACK_OF_TRANSPORTATION: NO

## 2025-08-18 ASSESSMENT — PAIN SCALES - GENERAL: PAINLEVEL_OUTOF10: 0 - NO PAIN

## 2025-08-19 ENCOUNTER — ANCILLARY PROCEDURE (OUTPATIENT)
Facility: CLINIC | Age: 75
End: 2025-08-19
Payer: MEDICARE

## 2025-08-19 ENCOUNTER — PATIENT OUTREACH (OUTPATIENT)
Dept: PRIMARY CARE | Facility: CLINIC | Age: 75
End: 2025-08-19
Payer: MEDICARE

## 2025-08-19 ENCOUNTER — TELEPHONE (OUTPATIENT)
Facility: CLINIC | Age: 75
End: 2025-08-19
Payer: MEDICARE

## 2025-08-19 LAB
ATRIAL RATE: 87 BPM
P AXIS: 33 DEGREES
P OFFSET: 165 MS
P ONSET: 113 MS
PR INTERVAL: 194 MS
Q ONSET: 210 MS
QRS COUNT: 14 BEATS
QRS DURATION: 98 MS
QT INTERVAL: 366 MS
QTC CALCULATION(BAZETT): 440 MS
QTC FREDERICIA: 414 MS
R AXIS: -29 DEGREES
T AXIS: 66 DEGREES
T OFFSET: 393 MS
VENTRICULAR RATE: 87 BPM

## 2025-08-19 PROCEDURE — RXMED WILLOW AMBULATORY MEDICATION CHARGE

## 2025-08-20 ENCOUNTER — ANCILLARY PROCEDURE (OUTPATIENT)
Facility: CLINIC | Age: 75
End: 2025-08-20
Payer: MEDICARE

## 2025-08-20 ENCOUNTER — PHARMACY VISIT (OUTPATIENT)
Dept: PHARMACY | Facility: CLINIC | Age: 75
End: 2025-08-20
Payer: COMMERCIAL

## 2025-08-20 DIAGNOSIS — R55 SYNCOPE, UNSPECIFIED SYNCOPE TYPE: ICD-10-CM

## 2025-08-20 DIAGNOSIS — Z95.9 CARDIAC DEVICE IN SITU: ICD-10-CM

## 2025-08-20 LAB
BACTERIA BLD CULT: NORMAL
BACTERIA BLD CULT: NORMAL

## 2025-08-21 ENCOUNTER — TELEPHONE (OUTPATIENT)
Dept: INPATIENT UNIT | Facility: HOSPITAL | Age: 75
End: 2025-08-21
Payer: MEDICARE

## 2025-08-21 ENCOUNTER — ANCILLARY PROCEDURE (OUTPATIENT)
Facility: CLINIC | Age: 75
End: 2025-08-21
Payer: MEDICARE

## 2025-08-25 ENCOUNTER — ANCILLARY PROCEDURE (OUTPATIENT)
Facility: CLINIC | Age: 75
End: 2025-08-25
Payer: MEDICARE

## 2025-08-25 DIAGNOSIS — Z95.9 CARDIAC DEVICE IN SITU: ICD-10-CM

## 2025-08-25 DIAGNOSIS — R55 SYNCOPE AND COLLAPSE: Primary | ICD-10-CM

## 2025-08-26 ENCOUNTER — OFFICE VISIT (OUTPATIENT)
Dept: PRIMARY CARE | Facility: CLINIC | Age: 75
End: 2025-08-26
Payer: MEDICARE

## 2025-08-26 VITALS
BODY MASS INDEX: 35.44 KG/M2 | OXYGEN SATURATION: 98 % | WEIGHT: 200 LBS | SYSTOLIC BLOOD PRESSURE: 114 MMHG | DIASTOLIC BLOOD PRESSURE: 72 MMHG | HEIGHT: 63 IN | TEMPERATURE: 97.3 F | HEART RATE: 89 BPM

## 2025-08-26 DIAGNOSIS — R91.8 LEFT UPPER LOBE PULMONARY INFILTRATE: ICD-10-CM

## 2025-08-26 DIAGNOSIS — U07.1 COVID-19: ICD-10-CM

## 2025-08-26 DIAGNOSIS — Z09 HOSPITAL DISCHARGE FOLLOW-UP: Primary | ICD-10-CM

## 2025-08-26 DIAGNOSIS — E11.22 TYPE 2 DIABETES MELLITUS WITH STAGE 3B CHRONIC KIDNEY DISEASE, WITHOUT LONG-TERM CURRENT USE OF INSULIN (MULTI): ICD-10-CM

## 2025-08-26 DIAGNOSIS — N18.32 TYPE 2 DIABETES MELLITUS WITH STAGE 3B CHRONIC KIDNEY DISEASE, WITHOUT LONG-TERM CURRENT USE OF INSULIN (MULTI): ICD-10-CM

## 2025-08-26 DIAGNOSIS — I12.9 CHRONIC KIDNEY DISEASE DUE TO HYPERTENSION: ICD-10-CM

## 2025-08-26 PROBLEM — N18.9 ACUTE KIDNEY INJURY SUPERIMPOSED ON CHRONIC KIDNEY DISEASE: Status: RESOLVED | Noted: 2023-03-27 | Resolved: 2025-08-26

## 2025-08-26 PROBLEM — N17.9 ACUTE KIDNEY INJURY SUPERIMPOSED ON CHRONIC KIDNEY DISEASE: Status: RESOLVED | Noted: 2023-03-27 | Resolved: 2025-08-26

## 2025-08-26 PROCEDURE — 1126F AMNT PAIN NOTED NONE PRSNT: CPT

## 2025-08-26 PROCEDURE — 99495 TRANSJ CARE MGMT MOD F2F 14D: CPT

## 2025-08-26 PROCEDURE — 1036F TOBACCO NON-USER: CPT

## 2025-08-26 PROCEDURE — 99214 OFFICE O/P EST MOD 30 MIN: CPT

## 2025-08-26 PROCEDURE — 3008F BODY MASS INDEX DOCD: CPT

## 2025-08-26 PROCEDURE — 3078F DIAST BP <80 MM HG: CPT

## 2025-08-26 PROCEDURE — 4010F ACE/ARB THERAPY RXD/TAKEN: CPT

## 2025-08-26 PROCEDURE — 1111F DSCHRG MED/CURRENT MED MERGE: CPT

## 2025-08-26 PROCEDURE — 3051F HG A1C>EQUAL 7.0%<8.0%: CPT

## 2025-08-26 PROCEDURE — 3074F SYST BP LT 130 MM HG: CPT

## 2025-08-26 RX ORDER — AMLODIPINE BESYLATE 2.5 MG/1
5 TABLET ORAL DAILY
COMMUNITY

## 2025-08-26 ASSESSMENT — PATIENT HEALTH QUESTIONNAIRE - PHQ9
3. TROUBLE FALLING OR STAYING ASLEEP OR SLEEPING TOO MUCH: SEVERAL DAYS
5. POOR APPETITE OR OVEREATING: SEVERAL DAYS
SUM OF ALL RESPONSES TO PHQ9 QUESTIONS 1 AND 2: 2
8. MOVING OR SPEAKING SO SLOWLY THAT OTHER PEOPLE COULD HAVE NOTICED. OR THE OPPOSITE, BEING SO FIGETY OR RESTLESS THAT YOU HAVE BEEN MOVING AROUND A LOT MORE THAN USUAL: NOT AT ALL
4. FEELING TIRED OR HAVING LITTLE ENERGY: SEVERAL DAYS
7. TROUBLE CONCENTRATING ON THINGS, SUCH AS READING THE NEWSPAPER OR WATCHING TELEVISION: SEVERAL DAYS
2. FEELING DOWN, DEPRESSED OR HOPELESS: SEVERAL DAYS
SUM OF ALL RESPONSES TO PHQ QUESTIONS 1-9: 7
1. LITTLE INTEREST OR PLEASURE IN DOING THINGS: SEVERAL DAYS
9. THOUGHTS THAT YOU WOULD BE BETTER OFF DEAD, OR OF HURTING YOURSELF: NOT AT ALL
6. FEELING BAD ABOUT YOURSELF - OR THAT YOU ARE A FAILURE OR HAVE LET YOURSELF OR YOUR FAMILY DOWN: SEVERAL DAYS

## 2025-08-26 ASSESSMENT — ENCOUNTER SYMPTOMS
PSYCHIATRIC NEGATIVE: 1
OCCASIONAL FEELINGS OF UNSTEADINESS: 1
CARDIOVASCULAR NEGATIVE: 1
DEPRESSION: 0
GASTROINTESTINAL NEGATIVE: 1
CONSTITUTIONAL NEGATIVE: 1
RESPIRATORY NEGATIVE: 1
LOSS OF SENSATION IN FEET: 0

## 2025-08-26 ASSESSMENT — PAIN SCALES - GENERAL: PAINLEVEL_OUTOF10: 0-NO PAIN

## 2025-09-01 ENCOUNTER — HOSPITAL ENCOUNTER (INPATIENT)
Facility: HOSPITAL | Age: 75
End: 2025-09-01
Admitting: INTERNAL MEDICINE
Payer: MEDICARE

## 2025-09-01 ENCOUNTER — APPOINTMENT (OUTPATIENT)
Dept: CARDIOLOGY | Facility: HOSPITAL | Age: 75
End: 2025-09-01
Payer: MEDICARE

## 2025-09-01 DIAGNOSIS — R65.10 SIRS (SYSTEMIC INFLAMMATORY RESPONSE SYNDROME) (MULTI): ICD-10-CM

## 2025-09-01 DIAGNOSIS — N17.9 AKI (ACUTE KIDNEY INJURY): ICD-10-CM

## 2025-09-01 DIAGNOSIS — R19.7 DIARRHEA, UNSPECIFIED TYPE: ICD-10-CM

## 2025-09-01 DIAGNOSIS — D72.829 LEUKOCYTOSIS, UNSPECIFIED TYPE: ICD-10-CM

## 2025-09-01 DIAGNOSIS — R11.2 NAUSEA AND VOMITING: ICD-10-CM

## 2025-09-01 DIAGNOSIS — D64.9 ANEMIA, UNSPECIFIED TYPE: ICD-10-CM

## 2025-09-01 DIAGNOSIS — R10.9 ABDOMINAL PAIN, UNSPECIFIED ABDOMINAL LOCATION: Primary | ICD-10-CM

## 2025-09-01 DIAGNOSIS — R10.10 PAIN OF UPPER ABDOMEN: ICD-10-CM

## 2025-09-01 DIAGNOSIS — K55.052: ICD-10-CM

## 2025-09-01 DIAGNOSIS — R10.13 EPIGASTRIC PAIN: ICD-10-CM

## 2025-09-01 PROBLEM — N18.30 ACUTE RENAL FAILURE SUPERIMPOSED ON STAGE 3 CHRONIC KIDNEY DISEASE (MULTI): Status: ACTIVE | Noted: 2023-03-27

## 2025-09-01 PROBLEM — A41.9 SEPSIS WITHOUT ACUTE ORGAN DYSFUNCTION (MULTI): Status: ACTIVE | Noted: 2025-09-01

## 2025-09-01 LAB
ALBUMIN SERPL BCP-MCNC: 4.2 G/DL (ref 3.4–5)
ALP SERPL-CCNC: 139 U/L (ref 33–136)
ALT SERPL W P-5'-P-CCNC: 12 U/L (ref 7–45)
ANION GAP SERPL CALCULATED.3IONS-SCNC: 15 MMOL/L (ref 10–20)
ANION GAP SERPL CALCULATED.3IONS-SCNC: 18 MMOL/L (ref 10–20)
APPEARANCE UR: CLEAR
APTT PPP: 24.2 SECONDS (ref 22–32.5)
AST SERPL W P-5'-P-CCNC: 11 U/L (ref 9–39)
BASE EXCESS BLDV CALC-SCNC: -3.2 MMOL/L (ref -2–3)
BASOPHILS # BLD AUTO: 0.1 X10*3/UL (ref 0–0.1)
BASOPHILS NFR BLD AUTO: 0.5 %
BILIRUB SERPL-MCNC: 0.4 MG/DL (ref 0–1.2)
BILIRUB UR STRIP.AUTO-MCNC: NEGATIVE MG/DL
BODY TEMPERATURE: 37 DEGREES CELSIUS
BUN SERPL-MCNC: 30 MG/DL (ref 6–23)
BUN SERPL-MCNC: 31 MG/DL (ref 6–23)
C DIF TOX TCDA+TCDB STL QL NAA+PROBE: NOT DETECTED
CALCIUM SERPL-MCNC: 8 MG/DL (ref 8.6–10.3)
CALCIUM SERPL-MCNC: 9.2 MG/DL (ref 8.6–10.3)
CHLORIDE SERPL-SCNC: 102 MMOL/L (ref 98–107)
CHLORIDE SERPL-SCNC: 108 MMOL/L (ref 98–107)
CO2 SERPL-SCNC: 20 MMOL/L (ref 21–32)
CO2 SERPL-SCNC: 22 MMOL/L (ref 21–32)
COLOR UR: ABNORMAL
CREAT SERPL-MCNC: 1.88 MG/DL (ref 0.5–1.05)
CREAT SERPL-MCNC: 2.23 MG/DL (ref 0.5–1.05)
EGFRCR SERPLBLD CKD-EPI 2021: 23 ML/MIN/1.73M*2
EGFRCR SERPLBLD CKD-EPI 2021: 28 ML/MIN/1.73M*2
EOSINOPHIL # BLD AUTO: 0.15 X10*3/UL (ref 0–0.4)
EOSINOPHIL NFR BLD AUTO: 0.7 %
ERYTHROCYTE [DISTWIDTH] IN BLOOD BY AUTOMATED COUNT: 14.6 % (ref 11.5–14.5)
FLUAV RNA RESP QL NAA+PROBE: NOT DETECTED
FLUBV RNA RESP QL NAA+PROBE: NOT DETECTED
GLUCOSE BLD MANUAL STRIP-MCNC: 139 MG/DL (ref 74–99)
GLUCOSE BLD MANUAL STRIP-MCNC: 185 MG/DL (ref 74–99)
GLUCOSE SERPL-MCNC: 224 MG/DL (ref 74–99)
GLUCOSE SERPL-MCNC: 259 MG/DL (ref 74–99)
GLUCOSE UR STRIP.AUTO-MCNC: ABNORMAL MG/DL
HCO3 BLDV-SCNC: 23.6 MMOL/L (ref 22–26)
HCT VFR BLD AUTO: 38.6 % (ref 36–46)
HGB BLD-MCNC: 11.9 G/DL (ref 12–16)
IMM GRANULOCYTES # BLD AUTO: 0.22 X10*3/UL (ref 0–0.5)
IMM GRANULOCYTES NFR BLD AUTO: 1.1 % (ref 0–0.9)
INHALED O2 CONCENTRATION: 21 %
INR PPP: 1 (ref 0.9–1.2)
KETONES UR STRIP.AUTO-MCNC: NEGATIVE MG/DL
LACTATE SERPL-SCNC: 1.9 MMOL/L (ref 0.4–2)
LEUKOCYTE ESTERASE UR QL STRIP.AUTO: NEGATIVE
LYMPHOCYTES # BLD AUTO: 1.4 X10*3/UL (ref 0.8–3)
LYMPHOCYTES NFR BLD AUTO: 6.9 %
MAGNESIUM SERPL-MCNC: 1.63 MG/DL (ref 1.6–2.4)
MAGNESIUM SERPL-MCNC: 1.68 MG/DL (ref 1.6–2.4)
MCH RBC QN AUTO: 25.5 PG (ref 26–34)
MCHC RBC AUTO-ENTMCNC: 30.8 G/DL (ref 32–36)
MCV RBC AUTO: 83 FL (ref 80–100)
MONOCYTES # BLD AUTO: 0.99 X10*3/UL (ref 0.05–0.8)
MONOCYTES NFR BLD AUTO: 4.9 %
NEUTROPHILS # BLD AUTO: 17.45 X10*3/UL (ref 1.6–5.5)
NEUTROPHILS NFR BLD AUTO: 85.9 %
NITRITE UR QL STRIP.AUTO: NEGATIVE
NRBC BLD-RTO: 0 /100 WBCS (ref 0–0)
OXYHGB MFR BLDV: 30.4 % (ref 45–75)
PCO2 BLDV: 48 MM HG (ref 41–51)
PH BLDV: 7.3 PH (ref 7.33–7.43)
PH UR STRIP.AUTO: 5 [PH]
PLATELET # BLD AUTO: 306 X10*3/UL (ref 150–450)
PO2 BLDV: 25 MM HG (ref 35–45)
POTASSIUM SERPL-SCNC: 3.9 MMOL/L (ref 3.5–5.3)
POTASSIUM SERPL-SCNC: 4.6 MMOL/L (ref 3.5–5.3)
PROT SERPL-MCNC: 7.7 G/DL (ref 6.4–8.2)
PROT UR STRIP.AUTO-MCNC: NEGATIVE MG/DL
PROTHROMBIN TIME: 10.8 SECONDS (ref 9.3–12.7)
RBC # BLD AUTO: 4.66 X10*6/UL (ref 4–5.2)
RBC # UR STRIP.AUTO: NEGATIVE MG/DL
RSV RNA RESP QL NAA+PROBE: NOT DETECTED
SAO2 % BLDV: 31 % (ref 45–75)
SARS-COV-2 RNA RESP QL NAA+PROBE: NOT DETECTED
SODIUM SERPL-SCNC: 138 MMOL/L (ref 136–145)
SODIUM SERPL-SCNC: 138 MMOL/L (ref 136–145)
SP GR UR STRIP.AUTO: 1.02
UROBILINOGEN UR STRIP.AUTO-MCNC: NORMAL MG/DL
WBC # BLD AUTO: 20.3 X10*3/UL (ref 4.4–11.3)

## 2025-09-01 PROCEDURE — 2500000004 HC RX 250 GENERAL PHARMACY W/ HCPCS (ALT 636 FOR OP/ED): Performed by: INTERNAL MEDICINE

## 2025-09-01 PROCEDURE — 36415 COLL VENOUS BLD VENIPUNCTURE: CPT

## 2025-09-01 PROCEDURE — 99223 1ST HOSP IP/OBS HIGH 75: CPT | Performed by: INTERNAL MEDICINE

## 2025-09-01 PROCEDURE — 81003 URINALYSIS AUTO W/O SCOPE: CPT

## 2025-09-01 PROCEDURE — 93005 ELECTROCARDIOGRAM TRACING: CPT

## 2025-09-01 PROCEDURE — 83036 HEMOGLOBIN GLYCOSYLATED A1C: CPT | Mod: TRILAB | Performed by: INTERNAL MEDICINE

## 2025-09-01 PROCEDURE — 83605 ASSAY OF LACTIC ACID: CPT

## 2025-09-01 PROCEDURE — 87040 BLOOD CULTURE FOR BACTERIA: CPT | Mod: TRILAB

## 2025-09-01 PROCEDURE — 97165 OT EVAL LOW COMPLEX 30 MIN: CPT | Mod: GO

## 2025-09-01 PROCEDURE — 87493 C DIFF AMPLIFIED PROBE: CPT

## 2025-09-01 PROCEDURE — 93010 ELECTROCARDIOGRAM REPORT: CPT | Performed by: INTERNAL MEDICINE

## 2025-09-01 PROCEDURE — 2500000001 HC RX 250 WO HCPCS SELF ADMINISTERED DRUGS (ALT 637 FOR MEDICARE OP): Performed by: REGISTERED NURSE

## 2025-09-01 PROCEDURE — 83735 ASSAY OF MAGNESIUM: CPT

## 2025-09-01 PROCEDURE — 83735 ASSAY OF MAGNESIUM: CPT | Performed by: INTERNAL MEDICINE

## 2025-09-01 PROCEDURE — 2500000001 HC RX 250 WO HCPCS SELF ADMINISTERED DRUGS (ALT 637 FOR MEDICARE OP): Performed by: INTERNAL MEDICINE

## 2025-09-01 PROCEDURE — 99285 EMERGENCY DEPT VISIT HI MDM: CPT | Mod: 25

## 2025-09-01 PROCEDURE — 87506 IADNA-DNA/RNA PROBE TQ 6-11: CPT | Mod: TRILAB | Performed by: INTERNAL MEDICINE

## 2025-09-01 PROCEDURE — 74176 CT ABD & PELVIS W/O CONTRAST: CPT | Performed by: RADIOLOGY

## 2025-09-01 PROCEDURE — 82947 ASSAY GLUCOSE BLOOD QUANT: CPT

## 2025-09-01 PROCEDURE — 2500000002 HC RX 250 W HCPCS SELF ADMINISTERED DRUGS (ALT 637 FOR MEDICARE OP, ALT 636 FOR OP/ED): Performed by: INTERNAL MEDICINE

## 2025-09-01 PROCEDURE — 97161 PT EVAL LOW COMPLEX 20 MIN: CPT | Mod: GP

## 2025-09-01 PROCEDURE — 82805 BLOOD GASES W/O2 SATURATION: CPT

## 2025-09-01 PROCEDURE — 2500000004 HC RX 250 GENERAL PHARMACY W/ HCPCS (ALT 636 FOR OP/ED)

## 2025-09-01 PROCEDURE — 2060000001 HC INTERMEDIATE ICU ROOM DAILY

## 2025-09-01 PROCEDURE — 85610 PROTHROMBIN TIME: CPT

## 2025-09-01 PROCEDURE — 80048 BASIC METABOLIC PNL TOTAL CA: CPT | Mod: CCI | Performed by: INTERNAL MEDICINE

## 2025-09-01 PROCEDURE — 87637 SARSCOV2&INF A&B&RSV AMP PRB: CPT

## 2025-09-01 PROCEDURE — 71046 X-RAY EXAM CHEST 2 VIEWS: CPT | Performed by: RADIOLOGY

## 2025-09-01 PROCEDURE — 85025 COMPLETE CBC W/AUTO DIFF WBC: CPT

## 2025-09-01 PROCEDURE — 80053 COMPREHEN METABOLIC PANEL: CPT

## 2025-09-01 PROCEDURE — 85730 THROMBOPLASTIN TIME PARTIAL: CPT

## 2025-09-01 RX ORDER — ONDANSETRON HYDROCHLORIDE 2 MG/ML
4 INJECTION, SOLUTION INTRAVENOUS ONCE
Status: COMPLETED | OUTPATIENT
Start: 2025-09-01 | End: 2025-09-01

## 2025-09-01 RX ORDER — DEXTROSE 50 % IN WATER (D50W) INTRAVENOUS SYRINGE
12.5
Status: DISCONTINUED | OUTPATIENT
Start: 2025-09-01 | End: 2025-09-01 | Stop reason: SDUPTHER

## 2025-09-01 RX ORDER — ACETAMINOPHEN 325 MG/1
650 TABLET ORAL EVERY 4 HOURS PRN
Status: ACTIVE | OUTPATIENT
Start: 2025-09-01

## 2025-09-01 RX ORDER — SODIUM CHLORIDE, SODIUM LACTATE, POTASSIUM CHLORIDE, CALCIUM CHLORIDE 600; 310; 30; 20 MG/100ML; MG/100ML; MG/100ML; MG/100ML
100 INJECTION, SOLUTION INTRAVENOUS CONTINUOUS
Status: ACTIVE | OUTPATIENT
Start: 2025-09-01 | End: 2025-09-02

## 2025-09-01 RX ORDER — VANCOMYCIN HYDROCHLORIDE 125 MG/1
125 CAPSULE ORAL 4 TIMES DAILY
Status: DISCONTINUED | OUTPATIENT
Start: 2025-09-01 | End: 2025-09-01

## 2025-09-01 RX ORDER — LIDOCAINE 560 MG/1
1 PATCH PERCUTANEOUS; TOPICAL; TRANSDERMAL DAILY
Status: DISPENSED | OUTPATIENT
Start: 2025-09-01

## 2025-09-01 RX ORDER — DEXTROSE 50 % IN WATER (D50W) INTRAVENOUS SYRINGE
25
Status: DISCONTINUED | OUTPATIENT
Start: 2025-09-01 | End: 2025-09-01 | Stop reason: SDUPTHER

## 2025-09-01 RX ORDER — ONDANSETRON HYDROCHLORIDE 2 MG/ML
4 INJECTION, SOLUTION INTRAVENOUS ONCE
Status: DISCONTINUED | OUTPATIENT
Start: 2025-09-01 | End: 2025-09-01

## 2025-09-01 RX ORDER — ALBUTEROL SULFATE 0.83 MG/ML
3 SOLUTION RESPIRATORY (INHALATION) EVERY 6 HOURS PRN
Status: ACTIVE | OUTPATIENT
Start: 2025-09-01

## 2025-09-01 RX ORDER — HEPARIN SODIUM 5000 [USP'U]/ML
5000 INJECTION, SOLUTION INTRAVENOUS; SUBCUTANEOUS EVERY 12 HOURS
Status: DISPENSED | OUTPATIENT
Start: 2025-09-01

## 2025-09-01 RX ORDER — VANCOMYCIN 1.5 G/300ML
1500 INJECTION, SOLUTION INTRAVENOUS ONCE
Status: COMPLETED | OUTPATIENT
Start: 2025-09-01 | End: 2025-09-01

## 2025-09-01 RX ORDER — METOCLOPRAMIDE HYDROCHLORIDE 5 MG/ML
10 INJECTION INTRAMUSCULAR; INTRAVENOUS ONCE
Status: COMPLETED | OUTPATIENT
Start: 2025-09-01 | End: 2025-09-01

## 2025-09-01 RX ORDER — DEXTROSE 50 % IN WATER (D50W) INTRAVENOUS SYRINGE
25
Status: ACTIVE | OUTPATIENT
Start: 2025-09-01

## 2025-09-01 RX ORDER — TRAMADOL HYDROCHLORIDE 50 MG/1
50 TABLET, FILM COATED ORAL EVERY 8 HOURS PRN
Status: DISPENSED | OUTPATIENT
Start: 2025-09-01

## 2025-09-01 RX ORDER — LAMOTRIGINE 100 MG/1
200 TABLET ORAL DAILY
Status: DISPENSED | OUTPATIENT
Start: 2025-09-01

## 2025-09-01 RX ORDER — INSULIN LISPRO 100 [IU]/ML
0-10 INJECTION, SOLUTION INTRAVENOUS; SUBCUTANEOUS
Status: DISPENSED | OUTPATIENT
Start: 2025-09-01

## 2025-09-01 RX ORDER — DEXTROSE 50 % IN WATER (D50W) INTRAVENOUS SYRINGE
12.5
Status: ACTIVE | OUTPATIENT
Start: 2025-09-01

## 2025-09-01 RX ORDER — DIPHENHYDRAMINE HYDROCHLORIDE 50 MG/ML
25 INJECTION, SOLUTION INTRAMUSCULAR; INTRAVENOUS ONCE
Status: COMPLETED | OUTPATIENT
Start: 2025-09-01 | End: 2025-09-01

## 2025-09-01 RX ORDER — PANTOPRAZOLE SODIUM 40 MG/10ML
40 INJECTION, POWDER, LYOPHILIZED, FOR SOLUTION INTRAVENOUS EVERY 12 HOURS
Status: DISCONTINUED | OUTPATIENT
Start: 2025-09-01 | End: 2025-09-04

## 2025-09-01 RX ORDER — ACETAMINOPHEN 650 MG/1
650 SUPPOSITORY RECTAL EVERY 4 HOURS PRN
Status: ACTIVE | OUTPATIENT
Start: 2025-09-01

## 2025-09-01 RX ORDER — ONDANSETRON HYDROCHLORIDE 2 MG/ML
INJECTION, SOLUTION INTRAVENOUS
Status: DISPENSED
Start: 2025-09-01 | End: 2025-09-01

## 2025-09-01 RX ORDER — VANCOMYCIN HYDROCHLORIDE 1 G/20ML
INJECTION, POWDER, LYOPHILIZED, FOR SOLUTION INTRAVENOUS DAILY PRN
Status: DISCONTINUED | OUTPATIENT
Start: 2025-09-01 | End: 2025-09-01

## 2025-09-01 RX ORDER — ACETAMINOPHEN 160 MG/5ML
650 SOLUTION ORAL EVERY 4 HOURS PRN
Status: ACTIVE | OUTPATIENT
Start: 2025-09-01

## 2025-09-01 RX ADMIN — INSULIN LISPRO 2 UNITS: 100 INJECTION, SOLUTION INTRAVENOUS; SUBCUTANEOUS at 11:34

## 2025-09-01 RX ADMIN — VANCOMYCIN HYDROCHLORIDE 125 MG: 125 CAPSULE ORAL at 13:52

## 2025-09-01 RX ADMIN — METOCLOPRAMIDE 10 MG: 5 INJECTION, SOLUTION INTRAMUSCULAR; INTRAVENOUS at 05:10

## 2025-09-01 RX ADMIN — SODIUM CHLORIDE 1000 ML: 900 INJECTION, SOLUTION INTRAVENOUS at 03:27

## 2025-09-01 RX ADMIN — SODIUM CHLORIDE, SODIUM LACTATE, POTASSIUM CHLORIDE, AND CALCIUM CHLORIDE 1000 ML: 600; 310; 30; 20 INJECTION, SOLUTION INTRAVENOUS at 10:15

## 2025-09-01 RX ADMIN — PIPERACILLIN SODIUM AND TAZOBACTAM SODIUM 3.38 G: 3; .375 INJECTION, SOLUTION INTRAVENOUS at 05:20

## 2025-09-01 RX ADMIN — HEPARIN SODIUM 5000 UNITS: 5000 INJECTION INTRAVENOUS; SUBCUTANEOUS at 10:57

## 2025-09-01 RX ADMIN — PANTOPRAZOLE SODIUM 40 MG: 40 INJECTION, POWDER, FOR SOLUTION INTRAVENOUS at 10:57

## 2025-09-01 RX ADMIN — HEPARIN SODIUM 5000 UNITS: 5000 INJECTION INTRAVENOUS; SUBCUTANEOUS at 21:03

## 2025-09-01 RX ADMIN — ONDANSETRON 4 MG: 2 INJECTION, SOLUTION INTRAMUSCULAR; INTRAVENOUS at 03:54

## 2025-09-01 RX ADMIN — LAMOTRIGINE 200 MG: 100 TABLET ORAL at 10:57

## 2025-09-01 RX ADMIN — TRAMADOL HYDROCHLORIDE 50 MG: 50 TABLET, COATED ORAL at 21:04

## 2025-09-01 RX ADMIN — DIPHENHYDRAMINE HYDROCHLORIDE 25 MG: 50 INJECTION, SOLUTION INTRAMUSCULAR; INTRAVENOUS at 06:29

## 2025-09-01 RX ADMIN — SODIUM CHLORIDE 1000 ML: 900 INJECTION, SOLUTION INTRAVENOUS at 05:19

## 2025-09-01 RX ADMIN — SODIUM CHLORIDE, SODIUM LACTATE, POTASSIUM CHLORIDE, AND CALCIUM CHLORIDE 100 ML/HR: 600; 310; 30; 20 INJECTION, SOLUTION INTRAVENOUS at 13:52

## 2025-09-01 RX ADMIN — PIPERACILLIN SODIUM AND TAZOBACTAM SODIUM 2.25 G: 2; .25 INJECTION, SOLUTION INTRAVENOUS at 11:34

## 2025-09-01 RX ADMIN — PANTOPRAZOLE SODIUM 40 MG: 40 INJECTION, POWDER, FOR SOLUTION INTRAVENOUS at 21:01

## 2025-09-01 RX ADMIN — VANCOMYCIN 1.5 G: 1.5 INJECTION, SOLUTION INTRAVENOUS at 05:59

## 2025-09-01 SDOH — SOCIAL STABILITY: SOCIAL INSECURITY: HAVE YOU HAD THOUGHTS OF HARMING ANYONE ELSE?: NO

## 2025-09-01 SDOH — HEALTH STABILITY: PHYSICAL HEALTH
HOW OFTEN DO YOU NEED TO HAVE SOMEONE HELP YOU WHEN YOU READ INSTRUCTIONS, PAMPHLETS, OR OTHER WRITTEN MATERIAL FROM YOUR DOCTOR OR PHARMACY?: NEVER

## 2025-09-01 SDOH — SOCIAL STABILITY: SOCIAL INSECURITY: WITHIN THE LAST YEAR, HAVE YOU BEEN HUMILIATED OR EMOTIONALLY ABUSED IN OTHER WAYS BY YOUR PARTNER OR EX-PARTNER?: NO

## 2025-09-01 SDOH — SOCIAL STABILITY: SOCIAL INSECURITY: DOES ANYONE TRY TO KEEP YOU FROM HAVING/CONTACTING OTHER FRIENDS OR DOING THINGS OUTSIDE YOUR HOME?: NO

## 2025-09-01 SDOH — SOCIAL STABILITY: SOCIAL INSECURITY: ARE YOU OR HAVE YOU BEEN THREATENED OR ABUSED PHYSICALLY, EMOTIONALLY, OR SEXUALLY BY ANYONE?: NO

## 2025-09-01 SDOH — SOCIAL STABILITY: SOCIAL INSECURITY: ARE THERE ANY APPARENT SIGNS OF INJURIES/BEHAVIORS THAT COULD BE RELATED TO ABUSE/NEGLECT?: NO

## 2025-09-01 SDOH — SOCIAL STABILITY: SOCIAL NETWORK
DO YOU BELONG TO ANY CLUBS OR ORGANIZATIONS SUCH AS CHURCH GROUPS, UNIONS, FRATERNAL OR ATHLETIC GROUPS, OR SCHOOL GROUPS?: YES

## 2025-09-01 SDOH — SOCIAL STABILITY: SOCIAL NETWORK: IN A TYPICAL WEEK, HOW MANY TIMES DO YOU TALK ON THE PHONE WITH FAMILY, FRIENDS, OR NEIGHBORS?: NEVER

## 2025-09-01 SDOH — SOCIAL STABILITY: SOCIAL INSECURITY: DO YOU FEEL UNSAFE GOING BACK TO THE PLACE WHERE YOU ARE LIVING?: NO

## 2025-09-01 SDOH — HEALTH STABILITY: PHYSICAL HEALTH: ON AVERAGE, HOW MANY DAYS PER WEEK DO YOU ENGAGE IN MODERATE TO STRENUOUS EXERCISE (LIKE A BRISK WALK)?: 1 DAY

## 2025-09-01 SDOH — SOCIAL STABILITY: SOCIAL NETWORK: HOW OFTEN DO YOU ATTEND MEETINGS OF THE CLUBS OR ORGANIZATIONS YOU BELONG TO?: NEVER

## 2025-09-01 SDOH — SOCIAL STABILITY: SOCIAL NETWORK: HOW OFTEN DO YOU GET TOGETHER WITH FRIENDS OR RELATIVES?: ONCE A WEEK

## 2025-09-01 SDOH — ECONOMIC STABILITY: INCOME INSECURITY: IN THE PAST 12 MONTHS HAS THE ELECTRIC, GAS, OIL, OR WATER COMPANY THREATENED TO SHUT OFF SERVICES IN YOUR HOME?: NO

## 2025-09-01 SDOH — SOCIAL STABILITY: SOCIAL NETWORK: HOW OFTEN DO YOU ATTEND CHURCH OR RELIGIOUS SERVICES?: NEVER

## 2025-09-01 SDOH — HEALTH STABILITY: PHYSICAL HEALTH: ON AVERAGE, HOW MANY MINUTES DO YOU ENGAGE IN EXERCISE AT THIS LEVEL?: 60 MIN

## 2025-09-01 SDOH — SOCIAL STABILITY: SOCIAL INSECURITY: ARE YOU MARRIED, WIDOWED, DIVORCED, SEPARATED, NEVER MARRIED, OR LIVING WITH A PARTNER?: WIDOWED

## 2025-09-01 SDOH — SOCIAL STABILITY: SOCIAL INSECURITY: DO YOU FEEL ANYONE HAS EXPLOITED OR TAKEN ADVANTAGE OF YOU FINANCIALLY OR OF YOUR PERSONAL PROPERTY?: NO

## 2025-09-01 SDOH — SOCIAL STABILITY: SOCIAL INSECURITY: WERE YOU ABLE TO COMPLETE ALL THE BEHAVIORAL HEALTH SCREENINGS?: YES

## 2025-09-01 SDOH — SOCIAL STABILITY: SOCIAL INSECURITY: WITHIN THE LAST YEAR, HAVE YOU BEEN AFRAID OF YOUR PARTNER OR EX-PARTNER?: NO

## 2025-09-01 SDOH — SOCIAL STABILITY: SOCIAL INSECURITY: ABUSE: ADULT

## 2025-09-01 SDOH — SOCIAL STABILITY: SOCIAL INSECURITY: HAS ANYONE EVER THREATENED TO HURT YOUR FAMILY OR YOUR PETS?: NO

## 2025-09-01 SDOH — SOCIAL STABILITY: SOCIAL INSECURITY: HAVE YOU HAD ANY THOUGHTS OF HARMING ANYONE ELSE?: NO

## 2025-09-01 ASSESSMENT — COGNITIVE AND FUNCTIONAL STATUS - GENERAL
EATING MEALS: A LITTLE
STANDING UP FROM CHAIR USING ARMS: A LITTLE
TOILETING: A LITTLE
MOVING FROM LYING ON BACK TO SITTING ON SIDE OF FLAT BED WITH BEDRAILS: A LITTLE
DAILY ACTIVITIY SCORE: 24
DAILY ACTIVITIY SCORE: 24
DRESSING REGULAR LOWER BODY CLOTHING: A LOT
DRESSING REGULAR UPPER BODY CLOTHING: A LITTLE
MOBILITY SCORE: 24
WALKING IN HOSPITAL ROOM: A LITTLE
DAILY ACTIVITIY SCORE: 24
PATIENT BASELINE BEDBOUND: NO
DAILY ACTIVITIY SCORE: 17
MOBILITY SCORE: 24
CLIMB 3 TO 5 STEPS WITH RAILING: A LOT
PERSONAL GROOMING: A LITTLE
MOBILITY SCORE: 17
HELP NEEDED FOR BATHING: A LITTLE
TURNING FROM BACK TO SIDE WHILE IN FLAT BAD: A LITTLE
MOBILITY SCORE: 24
MOVING TO AND FROM BED TO CHAIR: A LITTLE

## 2025-09-01 ASSESSMENT — ACTIVITIES OF DAILY LIVING (ADL)
BATHING_ASSISTANCE: MINIMAL
FEEDING YOURSELF: INDEPENDENT
JUDGMENT_ADEQUATE_SAFELY_COMPLETE_DAILY_ACTIVITIES: YES
DRESSING YOURSELF: INDEPENDENT
TOILETING: INDEPENDENT
BATHING: INDEPENDENT
GROOMING: INDEPENDENT
ADEQUATE_TO_COMPLETE_ADL: YES
ADL_ASSISTANCE: INDEPENDENT
ADL_ASSISTANCE: INDEPENDENT
HEARING - RIGHT EAR: HEARING AID
WALKS IN HOME: INDEPENDENT
PATIENT'S MEMORY ADEQUATE TO SAFELY COMPLETE DAILY ACTIVITIES?: YES
HEARING - LEFT EAR: HEARING AID
LACK_OF_TRANSPORTATION: NO

## 2025-09-01 ASSESSMENT — ENCOUNTER SYMPTOMS
FEVER: 0
ALLERGIC/IMMUNOLOGIC NEGATIVE: 1
MUSCULOSKELETAL NEGATIVE: 1
ENDOCRINE NEGATIVE: 1
EYES NEGATIVE: 1
VOMITING: 1
PSYCHIATRIC NEGATIVE: 1
HEMATOLOGIC/LYMPHATIC NEGATIVE: 1
ABDOMINAL PAIN: 1
CARDIOVASCULAR NEGATIVE: 1
NAUSEA: 1
CONSTITUTIONAL NEGATIVE: 1
RESPIRATORY NEGATIVE: 1
NEUROLOGICAL NEGATIVE: 1
DIARRHEA: 1
CHILLS: 0

## 2025-09-01 ASSESSMENT — PAIN - FUNCTIONAL ASSESSMENT
PAIN_FUNCTIONAL_ASSESSMENT: 0-10

## 2025-09-01 ASSESSMENT — PATIENT HEALTH QUESTIONNAIRE - PHQ9
SUM OF ALL RESPONSES TO PHQ9 QUESTIONS 1 & 2: 2
1. LITTLE INTEREST OR PLEASURE IN DOING THINGS: SEVERAL DAYS
2. FEELING DOWN, DEPRESSED OR HOPELESS: SEVERAL DAYS

## 2025-09-01 ASSESSMENT — LIFESTYLE VARIABLES
HOW OFTEN DO YOU HAVE 6 OR MORE DRINKS ON ONE OCCASION: NEVER
SUBSTANCE_ABUSE_PAST_12_MONTHS: NO
PRESCIPTION_ABUSE_PAST_12_MONTHS: NO
HOW MANY STANDARD DRINKS CONTAINING ALCOHOL DO YOU HAVE ON A TYPICAL DAY: 1 OR 2
AUDIT-C TOTAL SCORE: 1
HOW OFTEN DO YOU HAVE A DRINK CONTAINING ALCOHOL: MONTHLY OR LESS
AUDIT-C TOTAL SCORE: 1
SKIP TO QUESTIONS 9-10: 1

## 2025-09-01 ASSESSMENT — PAIN DESCRIPTION - LOCATION: LOCATION: HEAD

## 2025-09-01 ASSESSMENT — PAIN DESCRIPTION - DESCRIPTORS: DESCRIPTORS: ACHING

## 2025-09-01 ASSESSMENT — PAIN SCALES - GENERAL
PAINLEVEL_OUTOF10: 4
PAINLEVEL_OUTOF10: 4
PAINLEVEL_OUTOF10: 0 - NO PAIN
PAINLEVEL_OUTOF10: 7

## 2025-09-02 PROBLEM — R07.81 CHEST PAIN, PLEURITIC: Status: ACTIVE | Noted: 2025-09-02

## 2025-09-02 LAB
ALBUMIN SERPL BCP-MCNC: 3.1 G/DL (ref 3.4–5)
ALP SERPL-CCNC: 89 U/L (ref 33–136)
ALT SERPL W P-5'-P-CCNC: 7 U/L (ref 7–45)
ANION GAP SERPL CALCULATED.3IONS-SCNC: 10 MMOL/L (ref 10–20)
AST SERPL W P-5'-P-CCNC: 9 U/L (ref 9–39)
BASOPHILS # BLD AUTO: 0.05 X10*3/UL (ref 0–0.1)
BASOPHILS NFR BLD AUTO: 0.8 %
BILIRUB SERPL-MCNC: 0.4 MG/DL (ref 0–1.2)
BUN SERPL-MCNC: 18 MG/DL (ref 6–23)
C COLI+JEJ+UPSA DNA STL QL NAA+PROBE: NOT DETECTED
CALCIUM SERPL-MCNC: 8.2 MG/DL (ref 8.6–10.3)
CARDIAC TROPONIN I PNL SERPL HS: 7 NG/L (ref 0–13)
CARDIAC TROPONIN I PNL SERPL HS: 8 NG/L (ref 0–13)
CARDIAC TROPONIN I PNL SERPL HS: 9 NG/L (ref 0–13)
CARDIAC TROPONIN I PNL SERPL HS: 9 NG/L (ref 0–13)
CHLORIDE SERPL-SCNC: 112 MMOL/L (ref 98–107)
CO2 SERPL-SCNC: 23 MMOL/L (ref 21–32)
CREAT SERPL-MCNC: 1.57 MG/DL (ref 0.5–1.05)
EC STX1 GENE STL QL NAA+PROBE: NOT DETECTED
EC STX2 GENE STL QL NAA+PROBE: NOT DETECTED
EGFRCR SERPLBLD CKD-EPI 2021: 34 ML/MIN/1.73M*2
EOSINOPHIL # BLD AUTO: 0.24 X10*3/UL (ref 0–0.4)
EOSINOPHIL NFR BLD AUTO: 3.9 %
ERYTHROCYTE [DISTWIDTH] IN BLOOD BY AUTOMATED COUNT: 14.6 % (ref 11.5–14.5)
EST. AVERAGE GLUCOSE BLD GHB EST-MCNC: 163 MG/DL
GLUCOSE BLD MANUAL STRIP-MCNC: 119 MG/DL (ref 74–99)
GLUCOSE BLD MANUAL STRIP-MCNC: 145 MG/DL (ref 74–99)
GLUCOSE BLD MANUAL STRIP-MCNC: 199 MG/DL (ref 74–99)
GLUCOSE BLD MANUAL STRIP-MCNC: 216 MG/DL (ref 74–99)
GLUCOSE SERPL-MCNC: 120 MG/DL (ref 74–99)
HBA1C MFR BLD: 7.3 % (ref ?–5.7)
HCT VFR BLD AUTO: 28.6 % (ref 36–46)
HGB BLD-MCNC: 8.9 G/DL (ref 12–16)
IMM GRANULOCYTES # BLD AUTO: 0.02 X10*3/UL (ref 0–0.5)
IMM GRANULOCYTES NFR BLD AUTO: 0.3 % (ref 0–0.9)
LYMPHOCYTES # BLD AUTO: 2.28 X10*3/UL (ref 0.8–3)
LYMPHOCYTES NFR BLD AUTO: 36.7 %
MCH RBC QN AUTO: 25.7 PG (ref 26–34)
MCHC RBC AUTO-ENTMCNC: 31.1 G/DL (ref 32–36)
MCV RBC AUTO: 83 FL (ref 80–100)
MONOCYTES # BLD AUTO: 0.52 X10*3/UL (ref 0.05–0.8)
MONOCYTES NFR BLD AUTO: 8.4 %
NEUTROPHILS # BLD AUTO: 3.1 X10*3/UL (ref 1.6–5.5)
NEUTROPHILS NFR BLD AUTO: 49.9 %
NOROVIRUS GI + GII RNA STL NAA+PROBE: NOT DETECTED
NRBC BLD-RTO: 0 /100 WBCS (ref 0–0)
PLATELET # BLD AUTO: 224 X10*3/UL (ref 150–450)
POTASSIUM SERPL-SCNC: 3.7 MMOL/L (ref 3.5–5.3)
PROT SERPL-MCNC: 5.5 G/DL (ref 6.4–8.2)
RBC # BLD AUTO: 3.46 X10*6/UL (ref 4–5.2)
RV RNA STL NAA+PROBE: NOT DETECTED
SALMONELLA DNA STL QL NAA+PROBE: NOT DETECTED
SHIGELLA DNA SPEC QL NAA+PROBE: NOT DETECTED
SODIUM SERPL-SCNC: 141 MMOL/L (ref 136–145)
V CHOLERAE DNA STL QL NAA+PROBE: NOT DETECTED
WBC # BLD AUTO: 6.2 X10*3/UL (ref 4.4–11.3)
Y ENTEROCOL DNA STL QL NAA+PROBE: NOT DETECTED

## 2025-09-02 PROCEDURE — 2500000005 HC RX 250 GENERAL PHARMACY W/O HCPCS: Performed by: INTERNAL MEDICINE

## 2025-09-02 PROCEDURE — 84484 ASSAY OF TROPONIN QUANT: CPT | Performed by: INTERNAL MEDICINE

## 2025-09-02 PROCEDURE — 82947 ASSAY GLUCOSE BLOOD QUANT: CPT

## 2025-09-02 PROCEDURE — 2500000002 HC RX 250 W HCPCS SELF ADMINISTERED DRUGS (ALT 637 FOR MEDICARE OP, ALT 636 FOR OP/ED)

## 2025-09-02 PROCEDURE — 99222 1ST HOSP IP/OBS MODERATE 55: CPT

## 2025-09-02 PROCEDURE — 97116 GAIT TRAINING THERAPY: CPT | Mod: GP,CQ

## 2025-09-02 PROCEDURE — 80053 COMPREHEN METABOLIC PANEL: CPT | Performed by: INTERNAL MEDICINE

## 2025-09-02 PROCEDURE — 36415 COLL VENOUS BLD VENIPUNCTURE: CPT

## 2025-09-02 PROCEDURE — 2500000001 HC RX 250 WO HCPCS SELF ADMINISTERED DRUGS (ALT 637 FOR MEDICARE OP): Performed by: INTERNAL MEDICINE

## 2025-09-02 PROCEDURE — 2060000001 HC INTERMEDIATE ICU ROOM DAILY

## 2025-09-02 PROCEDURE — 97535 SELF CARE MNGMENT TRAINING: CPT | Mod: GO

## 2025-09-02 PROCEDURE — A9540 TC99M MAA: HCPCS | Performed by: INTERNAL MEDICINE

## 2025-09-02 PROCEDURE — 84484 ASSAY OF TROPONIN QUANT: CPT

## 2025-09-02 PROCEDURE — 78803 RP LOCLZJ TUM SPECT 1 AREA: CPT

## 2025-09-02 PROCEDURE — 97110 THERAPEUTIC EXERCISES: CPT | Mod: GP,CQ

## 2025-09-02 PROCEDURE — 93010 ELECTROCARDIOGRAM REPORT: CPT | Performed by: INTERNAL MEDICINE

## 2025-09-02 PROCEDURE — 3430000001 HC RX 343 DIAGNOSTIC RADIOPHARMACEUTICALS: Performed by: INTERNAL MEDICINE

## 2025-09-02 PROCEDURE — 99232 SBSQ HOSP IP/OBS MODERATE 35: CPT | Performed by: INTERNAL MEDICINE

## 2025-09-02 PROCEDURE — 2500000004 HC RX 250 GENERAL PHARMACY W/ HCPCS (ALT 636 FOR OP/ED): Performed by: INTERNAL MEDICINE

## 2025-09-02 PROCEDURE — 36415 COLL VENOUS BLD VENIPUNCTURE: CPT | Performed by: INTERNAL MEDICINE

## 2025-09-02 PROCEDURE — 85025 COMPLETE CBC W/AUTO DIFF WBC: CPT | Performed by: INTERNAL MEDICINE

## 2025-09-02 RX ORDER — ONDANSETRON HYDROCHLORIDE 2 MG/ML
4 INJECTION, SOLUTION INTRAVENOUS EVERY 6 HOURS PRN
Status: DISPENSED | OUTPATIENT
Start: 2025-09-02

## 2025-09-02 RX ORDER — SUCRALFATE 1 G/10ML
1 SUSPENSION ORAL EVERY 6 HOURS SCHEDULED
Status: DISPENSED | OUTPATIENT
Start: 2025-09-02

## 2025-09-02 RX ADMIN — HEPARIN SODIUM 5000 UNITS: 5000 INJECTION INTRAVENOUS; SUBCUTANEOUS at 21:36

## 2025-09-02 RX ADMIN — LIDOCAINE 4% 1 PATCH: 40 PATCH TOPICAL at 09:26

## 2025-09-02 RX ADMIN — SUCRALFATE 1 G: 1 SUSPENSION ORAL at 18:09

## 2025-09-02 RX ADMIN — KIT FOR THE PREPARATION OF TECHNETIUM TC 99M ALBUMIN AGGREGATED 4.4 MILLICURIE: 2.5 INJECTION, POWDER, FOR SOLUTION INTRAVENOUS at 10:50

## 2025-09-02 RX ADMIN — LAMOTRIGINE 200 MG: 100 TABLET ORAL at 09:26

## 2025-09-02 RX ADMIN — PANTOPRAZOLE SODIUM 40 MG: 40 INJECTION, POWDER, FOR SOLUTION INTRAVENOUS at 21:36

## 2025-09-02 RX ADMIN — SODIUM CHLORIDE, SODIUM LACTATE, POTASSIUM CHLORIDE, AND CALCIUM CHLORIDE 100 ML/HR: 600; 310; 30; 20 INJECTION, SOLUTION INTRAVENOUS at 00:15

## 2025-09-02 RX ADMIN — HEPARIN SODIUM 5000 UNITS: 5000 INJECTION INTRAVENOUS; SUBCUTANEOUS at 09:26

## 2025-09-02 RX ADMIN — PANTOPRAZOLE SODIUM 40 MG: 40 INJECTION, POWDER, FOR SOLUTION INTRAVENOUS at 09:26

## 2025-09-02 RX ADMIN — ONDANSETRON 4 MG: 2 INJECTION, SOLUTION INTRAMUSCULAR; INTRAVENOUS at 15:14

## 2025-09-02 ASSESSMENT — ENCOUNTER SYMPTOMS
NEUROLOGICAL NEGATIVE: 1
NAUSEA: 1
CARDIOVASCULAR NEGATIVE: 1
COUGH: 1
ALLERGIC/IMMUNOLOGIC NEGATIVE: 1
VOMITING: 1
HEMATOLOGIC/LYMPHATIC NEGATIVE: 1
LIGHT-HEADEDNESS: 1
EYES NEGATIVE: 1
RESPIRATORY NEGATIVE: 1
CONSTITUTIONAL NEGATIVE: 1
DIZZINESS: 1
ENDOCRINE NEGATIVE: 1
FATIGUE: 1
DIARRHEA: 1
CHEST TIGHTNESS: 1

## 2025-09-02 ASSESSMENT — COGNITIVE AND FUNCTIONAL STATUS - GENERAL
PERSONAL GROOMING: A LITTLE
HELP NEEDED FOR BATHING: A LITTLE
EATING MEALS: A LITTLE
TURNING FROM BACK TO SIDE WHILE IN FLAT BAD: A LITTLE
STANDING UP FROM CHAIR USING ARMS: A LITTLE
EATING MEALS: A LITTLE
WALKING IN HOSPITAL ROOM: A LITTLE
TOILETING: A LITTLE
MOBILITY SCORE: 17
MOVING FROM LYING ON BACK TO SITTING ON SIDE OF FLAT BED WITH BEDRAILS: A LITTLE
DAILY ACTIVITIY SCORE: 18
MOBILITY SCORE: 24
DAILY ACTIVITIY SCORE: 23
DRESSING REGULAR UPPER BODY CLOTHING: A LITTLE
DAILY ACTIVITIY SCORE: 23
MOBILITY SCORE: 24
MOVING TO AND FROM BED TO CHAIR: A LITTLE
EATING MEALS: A LITTLE
CLIMB 3 TO 5 STEPS WITH RAILING: A LOT
DRESSING REGULAR LOWER BODY CLOTHING: A LITTLE

## 2025-09-02 ASSESSMENT — PAIN SCALES - GENERAL
PAINLEVEL_OUTOF10: 0 - NO PAIN
PAINLEVEL_OUTOF10: 4

## 2025-09-02 ASSESSMENT — ACTIVITIES OF DAILY LIVING (ADL)
HOME_MANAGEMENT_TIME_ENTRY: 25
BATHING_LEVEL_OF_ASSISTANCE: MINIMUM ASSISTANCE
BATHING_WHERE_ASSESSED: SITTING SINKSIDE

## 2025-09-02 ASSESSMENT — PAIN - FUNCTIONAL ASSESSMENT
PAIN_FUNCTIONAL_ASSESSMENT: 0-10
PAIN_FUNCTIONAL_ASSESSMENT: 0-10

## 2025-09-03 LAB
ALBUMIN SERPL BCP-MCNC: 3.2 G/DL (ref 3.4–5)
ALP SERPL-CCNC: 93 U/L (ref 33–136)
ALT SERPL W P-5'-P-CCNC: 7 U/L (ref 7–45)
ANION GAP SERPL CALCULATED.3IONS-SCNC: 12 MMOL/L (ref 10–20)
AST SERPL W P-5'-P-CCNC: 9 U/L (ref 9–39)
ATRIAL RATE: 59 BPM
ATRIAL RATE: 62 BPM
ATRIAL RATE: 64 BPM
ATRIAL RATE: 64 BPM
BASOPHILS # BLD AUTO: 0.05 X10*3/UL (ref 0–0.1)
BASOPHILS NFR BLD AUTO: 0.9 %
BILIRUB SERPL-MCNC: 0.3 MG/DL (ref 0–1.2)
BUN SERPL-MCNC: 13 MG/DL (ref 6–23)
CALCIUM SERPL-MCNC: 8.6 MG/DL (ref 8.6–10.3)
CHLORIDE SERPL-SCNC: 110 MMOL/L (ref 98–107)
CO2 SERPL-SCNC: 22 MMOL/L (ref 21–32)
CREAT SERPL-MCNC: 1.44 MG/DL (ref 0.5–1.05)
EGFRCR SERPLBLD CKD-EPI 2021: 38 ML/MIN/1.73M*2
EOSINOPHIL # BLD AUTO: 0.23 X10*3/UL (ref 0–0.4)
EOSINOPHIL NFR BLD AUTO: 4.1 %
ERYTHROCYTE [DISTWIDTH] IN BLOOD BY AUTOMATED COUNT: 14.4 % (ref 11.5–14.5)
GLUCOSE BLD MANUAL STRIP-MCNC: 160 MG/DL (ref 74–99)
GLUCOSE BLD MANUAL STRIP-MCNC: 163 MG/DL (ref 74–99)
GLUCOSE BLD MANUAL STRIP-MCNC: 172 MG/DL (ref 74–99)
GLUCOSE BLD MANUAL STRIP-MCNC: 174 MG/DL (ref 74–99)
GLUCOSE SERPL-MCNC: 137 MG/DL (ref 74–99)
HCT VFR BLD AUTO: 28.8 % (ref 36–46)
HGB BLD-MCNC: 9.1 G/DL (ref 12–16)
IMM GRANULOCYTES # BLD AUTO: 0.03 X10*3/UL (ref 0–0.5)
IMM GRANULOCYTES NFR BLD AUTO: 0.5 % (ref 0–0.9)
LYMPHOCYTES # BLD AUTO: 1.67 X10*3/UL (ref 0.8–3)
LYMPHOCYTES NFR BLD AUTO: 29.7 %
MCH RBC QN AUTO: 25.9 PG (ref 26–34)
MCHC RBC AUTO-ENTMCNC: 31.6 G/DL (ref 32–36)
MCV RBC AUTO: 82 FL (ref 80–100)
MONOCYTES # BLD AUTO: 0.54 X10*3/UL (ref 0.05–0.8)
MONOCYTES NFR BLD AUTO: 9.6 %
NEUTROPHILS # BLD AUTO: 3.11 X10*3/UL (ref 1.6–5.5)
NEUTROPHILS NFR BLD AUTO: 55.2 %
NRBC BLD-RTO: 0 /100 WBCS (ref 0–0)
P AXIS: -17 DEGREES
P AXIS: 13 DEGREES
P AXIS: 22 DEGREES
P OFFSET: 135 MS
P OFFSET: 144 MS
P OFFSET: 149 MS
P OFFSET: 150 MS
P ONSET: 89 MS
P ONSET: 92 MS
P ONSET: 94 MS
P ONSET: 99 MS
PLATELET # BLD AUTO: 247 X10*3/UL (ref 150–450)
POTASSIUM SERPL-SCNC: 4 MMOL/L (ref 3.5–5.3)
PR INTERVAL: 222 MS
PR INTERVAL: 232 MS
PR INTERVAL: 236 MS
PR INTERVAL: 240 MS
PROT SERPL-MCNC: 5.8 G/DL (ref 6.4–8.2)
Q ONSET: 207 MS
Q ONSET: 210 MS
Q ONSET: 210 MS
Q ONSET: 212 MS
QRS COUNT: 10 BEATS
QRS COUNT: 11 BEATS
QRS DURATION: 100 MS
QRS DURATION: 92 MS
QRS DURATION: 96 MS
QRS DURATION: 98 MS
QT INTERVAL: 416 MS
QT INTERVAL: 424 MS
QT INTERVAL: 452 MS
QT INTERVAL: 596 MS
QTC CALCULATION(BAZETT): 429 MS
QTC CALCULATION(BAZETT): 437 MS
QTC CALCULATION(BAZETT): 458 MS
QTC CALCULATION(BAZETT): 590 MS
QTC FREDERICIA: 425 MS
QTC FREDERICIA: 433 MS
QTC FREDERICIA: 457 MS
QTC FREDERICIA: 593 MS
R AXIS: -1 DEGREES
R AXIS: -12 DEGREES
R AXIS: -17 DEGREES
R AXIS: -9 DEGREES
RBC # BLD AUTO: 3.51 X10*6/UL (ref 4–5.2)
SODIUM SERPL-SCNC: 140 MMOL/L (ref 136–145)
T AXIS: 17 DEGREES
T AXIS: 20 DEGREES
T AXIS: 51 DEGREES
T AXIS: 80 DEGREES
T OFFSET: 420 MS
T OFFSET: 422 MS
T OFFSET: 433 MS
T OFFSET: 508 MS
VENTRICULAR RATE: 59 BPM
VENTRICULAR RATE: 62 BPM
VENTRICULAR RATE: 64 BPM
VENTRICULAR RATE: 64 BPM
WBC # BLD AUTO: 5.6 X10*3/UL (ref 4.4–11.3)

## 2025-09-03 PROCEDURE — 2500000005 HC RX 250 GENERAL PHARMACY W/O HCPCS: Performed by: INTERNAL MEDICINE

## 2025-09-03 PROCEDURE — 83993 ASSAY FOR CALPROTECTIN FECAL: CPT | Performed by: INTERNAL MEDICINE

## 2025-09-03 PROCEDURE — 2500000004 HC RX 250 GENERAL PHARMACY W/ HCPCS (ALT 636 FOR OP/ED): Performed by: INTERNAL MEDICINE

## 2025-09-03 PROCEDURE — 82947 ASSAY GLUCOSE BLOOD QUANT: CPT

## 2025-09-03 PROCEDURE — 2500000002 HC RX 250 W HCPCS SELF ADMINISTERED DRUGS (ALT 637 FOR MEDICARE OP, ALT 636 FOR OP/ED)

## 2025-09-03 PROCEDURE — 97116 GAIT TRAINING THERAPY: CPT | Mod: GP,CQ

## 2025-09-03 PROCEDURE — 2500000002 HC RX 250 W HCPCS SELF ADMINISTERED DRUGS (ALT 637 FOR MEDICARE OP, ALT 636 FOR OP/ED): Performed by: INTERNAL MEDICINE

## 2025-09-03 PROCEDURE — 85025 COMPLETE CBC W/AUTO DIFF WBC: CPT | Performed by: INTERNAL MEDICINE

## 2025-09-03 PROCEDURE — 2500000001 HC RX 250 WO HCPCS SELF ADMINISTERED DRUGS (ALT 637 FOR MEDICARE OP): Performed by: INTERNAL MEDICINE

## 2025-09-03 PROCEDURE — 97110 THERAPEUTIC EXERCISES: CPT | Mod: GP,CQ

## 2025-09-03 PROCEDURE — 80053 COMPREHEN METABOLIC PANEL: CPT | Performed by: INTERNAL MEDICINE

## 2025-09-03 PROCEDURE — 2060000001 HC INTERMEDIATE ICU ROOM DAILY

## 2025-09-03 PROCEDURE — 97535 SELF CARE MNGMENT TRAINING: CPT | Mod: GO

## 2025-09-03 PROCEDURE — 99232 SBSQ HOSP IP/OBS MODERATE 35: CPT | Performed by: NURSE PRACTITIONER

## 2025-09-03 PROCEDURE — 99233 SBSQ HOSP IP/OBS HIGH 50: CPT | Performed by: INTERNAL MEDICINE

## 2025-09-03 PROCEDURE — 36415 COLL VENOUS BLD VENIPUNCTURE: CPT | Performed by: INTERNAL MEDICINE

## 2025-09-03 PROCEDURE — 2500000005 HC RX 250 GENERAL PHARMACY W/O HCPCS: Performed by: REGISTERED NURSE

## 2025-09-03 RX ORDER — TALC
6 POWDER (GRAM) TOPICAL NIGHTLY PRN
Status: DISPENSED | OUTPATIENT
Start: 2025-09-03

## 2025-09-03 RX ORDER — LISINOPRIL 5 MG/1
5 TABLET ORAL DAILY
Status: DISPENSED | OUTPATIENT
Start: 2025-09-03

## 2025-09-03 RX ADMIN — SUCRALFATE 1 G: 1 SUSPENSION ORAL at 12:36

## 2025-09-03 RX ADMIN — INSULIN LISPRO 2 UNITS: 100 INJECTION, SOLUTION INTRAVENOUS; SUBCUTANEOUS at 16:39

## 2025-09-03 RX ADMIN — PIPERACILLIN SODIUM AND TAZOBACTAM SODIUM 2.25 G: 2; .25 INJECTION, SOLUTION INTRAVENOUS at 20:22

## 2025-09-03 RX ADMIN — INSULIN LISPRO 2 UNITS: 100 INJECTION, SOLUTION INTRAVENOUS; SUBCUTANEOUS at 12:36

## 2025-09-03 RX ADMIN — Medication 6 MG: at 00:27

## 2025-09-03 RX ADMIN — LIDOCAINE 4% 1 PATCH: 40 PATCH TOPICAL at 08:27

## 2025-09-03 RX ADMIN — PANTOPRAZOLE SODIUM 40 MG: 40 INJECTION, POWDER, FOR SOLUTION INTRAVENOUS at 10:35

## 2025-09-03 RX ADMIN — SUCRALFATE 1 G: 1 SUSPENSION ORAL at 00:07

## 2025-09-03 RX ADMIN — ONDANSETRON 4 MG: 2 INJECTION, SOLUTION INTRAMUSCULAR; INTRAVENOUS at 18:07

## 2025-09-03 RX ADMIN — SUCRALFATE 1 G: 1 SUSPENSION ORAL at 05:54

## 2025-09-03 RX ADMIN — INSULIN LISPRO 2 UNITS: 100 INJECTION, SOLUTION INTRAVENOUS; SUBCUTANEOUS at 08:27

## 2025-09-03 RX ADMIN — LAMOTRIGINE 200 MG: 100 TABLET ORAL at 08:28

## 2025-09-03 RX ADMIN — LISINOPRIL 5 MG: 5 TABLET ORAL at 10:23

## 2025-09-03 RX ADMIN — SUCRALFATE 1 G: 1 SUSPENSION ORAL at 23:24

## 2025-09-03 RX ADMIN — HEPARIN SODIUM 5000 UNITS: 5000 INJECTION INTRAVENOUS; SUBCUTANEOUS at 22:11

## 2025-09-03 RX ADMIN — HEPARIN SODIUM 5000 UNITS: 5000 INJECTION INTRAVENOUS; SUBCUTANEOUS at 10:35

## 2025-09-03 RX ADMIN — PANTOPRAZOLE SODIUM 40 MG: 40 INJECTION, POWDER, FOR SOLUTION INTRAVENOUS at 22:11

## 2025-09-03 RX ADMIN — SUCRALFATE 1 G: 1 SUSPENSION ORAL at 17:14

## 2025-09-03 RX ADMIN — PIPERACILLIN SODIUM AND TAZOBACTAM SODIUM 2.25 G: 2; .25 INJECTION, SOLUTION INTRAVENOUS at 15:05

## 2025-09-03 ASSESSMENT — PAIN SCALES - GENERAL
PAINLEVEL_OUTOF10: 4
PAINLEVEL_OUTOF10: 0 - NO PAIN
PAINLEVEL_OUTOF10: 6
PAINLEVEL_OUTOF10: 0 - NO PAIN

## 2025-09-03 ASSESSMENT — COGNITIVE AND FUNCTIONAL STATUS - GENERAL
PERSONAL GROOMING: A LITTLE
TURNING FROM BACK TO SIDE WHILE IN FLAT BAD: A LITTLE
MOBILITY SCORE: 17
CLIMB 3 TO 5 STEPS WITH RAILING: A LOT
DAILY ACTIVITIY SCORE: 19
STANDING UP FROM CHAIR USING ARMS: A LITTLE
DAILY ACTIVITIY SCORE: 24
MOVING FROM LYING ON BACK TO SITTING ON SIDE OF FLAT BED WITH BEDRAILS: A LITTLE
CLIMB 3 TO 5 STEPS WITH RAILING: A LITTLE
MOBILITY SCORE: 22
DRESSING REGULAR UPPER BODY CLOTHING: A LITTLE
DRESSING REGULAR LOWER BODY CLOTHING: A LITTLE
DAILY ACTIVITIY SCORE: 23
TOILETING: A LITTLE
MOBILITY SCORE: 24
DRESSING REGULAR LOWER BODY CLOTHING: A LITTLE
WALKING IN HOSPITAL ROOM: A LITTLE
HELP NEEDED FOR BATHING: A LITTLE
WALKING IN HOSPITAL ROOM: A LITTLE
MOVING TO AND FROM BED TO CHAIR: A LITTLE

## 2025-09-03 ASSESSMENT — ACTIVITIES OF DAILY LIVING (ADL)
BATHING_LEVEL_OF_ASSISTANCE: CLOSE SUPERVISION
HOME_MANAGEMENT_TIME_ENTRY: 23
BATHING_WHERE_ASSESSED: SITTING SINKSIDE;STANDING SINKSIDE

## 2025-09-03 ASSESSMENT — PAIN - FUNCTIONAL ASSESSMENT
PAIN_FUNCTIONAL_ASSESSMENT: 0-10

## 2025-09-04 LAB
ALBUMIN SERPL BCP-MCNC: 3.3 G/DL (ref 3.4–5)
ALP SERPL-CCNC: 108 U/L (ref 33–136)
ALT SERPL W P-5'-P-CCNC: 8 U/L (ref 7–45)
ANION GAP SERPL CALCULATED.3IONS-SCNC: 12 MMOL/L (ref 10–20)
AST SERPL W P-5'-P-CCNC: 10 U/L (ref 9–39)
BASOPHILS # BLD AUTO: 0.05 X10*3/UL (ref 0–0.1)
BASOPHILS NFR BLD AUTO: 0.9 %
BILIRUB SERPL-MCNC: 0.4 MG/DL (ref 0–1.2)
BUN SERPL-MCNC: 10 MG/DL (ref 6–23)
CALCIUM SERPL-MCNC: 8.9 MG/DL (ref 8.6–10.3)
CHLORIDE SERPL-SCNC: 108 MMOL/L (ref 98–107)
CO2 SERPL-SCNC: 25 MMOL/L (ref 21–32)
CREAT SERPL-MCNC: 1.65 MG/DL (ref 0.5–1.05)
EGFRCR SERPLBLD CKD-EPI 2021: 32 ML/MIN/1.73M*2
EOSINOPHIL # BLD AUTO: 0.21 X10*3/UL (ref 0–0.4)
EOSINOPHIL NFR BLD AUTO: 3.6 %
ERYTHROCYTE [DISTWIDTH] IN BLOOD BY AUTOMATED COUNT: 14.5 % (ref 11.5–14.5)
GLUCOSE BLD MANUAL STRIP-MCNC: 107 MG/DL (ref 74–99)
GLUCOSE BLD MANUAL STRIP-MCNC: 124 MG/DL (ref 74–99)
GLUCOSE BLD MANUAL STRIP-MCNC: 158 MG/DL (ref 74–99)
GLUCOSE BLD MANUAL STRIP-MCNC: 165 MG/DL (ref 74–99)
GLUCOSE SERPL-MCNC: 148 MG/DL (ref 74–99)
HCT VFR BLD AUTO: 31.6 % (ref 36–46)
HGB BLD-MCNC: 9.8 G/DL (ref 12–16)
IMM GRANULOCYTES # BLD AUTO: 0.03 X10*3/UL (ref 0–0.5)
IMM GRANULOCYTES NFR BLD AUTO: 0.5 % (ref 0–0.9)
LYMPHOCYTES # BLD AUTO: 1.73 X10*3/UL (ref 0.8–3)
LYMPHOCYTES NFR BLD AUTO: 29.8 %
MCH RBC QN AUTO: 25.5 PG (ref 26–34)
MCHC RBC AUTO-ENTMCNC: 31 G/DL (ref 32–36)
MCV RBC AUTO: 82 FL (ref 80–100)
MONOCYTES # BLD AUTO: 0.51 X10*3/UL (ref 0.05–0.8)
MONOCYTES NFR BLD AUTO: 8.8 %
NEUTROPHILS # BLD AUTO: 3.27 X10*3/UL (ref 1.6–5.5)
NEUTROPHILS NFR BLD AUTO: 56.4 %
NRBC BLD-RTO: 0 /100 WBCS (ref 0–0)
PLATELET # BLD AUTO: 251 X10*3/UL (ref 150–450)
POTASSIUM SERPL-SCNC: 4.1 MMOL/L (ref 3.5–5.3)
PROT SERPL-MCNC: 6.2 G/DL (ref 6.4–8.2)
RBC # BLD AUTO: 3.84 X10*6/UL (ref 4–5.2)
SODIUM SERPL-SCNC: 141 MMOL/L (ref 136–145)
WBC # BLD AUTO: 5.8 X10*3/UL (ref 4.4–11.3)

## 2025-09-04 PROCEDURE — 99232 SBSQ HOSP IP/OBS MODERATE 35: CPT | Performed by: INTERNAL MEDICINE

## 2025-09-04 PROCEDURE — 97110 THERAPEUTIC EXERCISES: CPT | Mod: GP,CQ

## 2025-09-04 PROCEDURE — 2500000004 HC RX 250 GENERAL PHARMACY W/ HCPCS (ALT 636 FOR OP/ED): Performed by: INTERNAL MEDICINE

## 2025-09-04 PROCEDURE — 2500000002 HC RX 250 W HCPCS SELF ADMINISTERED DRUGS (ALT 637 FOR MEDICARE OP, ALT 636 FOR OP/ED): Performed by: INTERNAL MEDICINE

## 2025-09-04 PROCEDURE — 97116 GAIT TRAINING THERAPY: CPT | Mod: GP,CQ

## 2025-09-04 PROCEDURE — 85025 COMPLETE CBC W/AUTO DIFF WBC: CPT | Performed by: INTERNAL MEDICINE

## 2025-09-04 PROCEDURE — 2500000005 HC RX 250 GENERAL PHARMACY W/O HCPCS: Performed by: INTERNAL MEDICINE

## 2025-09-04 PROCEDURE — 82947 ASSAY GLUCOSE BLOOD QUANT: CPT

## 2025-09-04 PROCEDURE — 2500000001 HC RX 250 WO HCPCS SELF ADMINISTERED DRUGS (ALT 637 FOR MEDICARE OP)

## 2025-09-04 PROCEDURE — 2500000001 HC RX 250 WO HCPCS SELF ADMINISTERED DRUGS (ALT 637 FOR MEDICARE OP): Performed by: INTERNAL MEDICINE

## 2025-09-04 PROCEDURE — 74018 RADEX ABDOMEN 1 VIEW: CPT | Performed by: RADIOLOGY

## 2025-09-04 PROCEDURE — 80053 COMPREHEN METABOLIC PANEL: CPT | Performed by: INTERNAL MEDICINE

## 2025-09-04 PROCEDURE — 2500000002 HC RX 250 W HCPCS SELF ADMINISTERED DRUGS (ALT 637 FOR MEDICARE OP, ALT 636 FOR OP/ED)

## 2025-09-04 PROCEDURE — 2060000001 HC INTERMEDIATE ICU ROOM DAILY

## 2025-09-04 PROCEDURE — 36415 COLL VENOUS BLD VENIPUNCTURE: CPT | Performed by: INTERNAL MEDICINE

## 2025-09-04 PROCEDURE — 99232 SBSQ HOSP IP/OBS MODERATE 35: CPT | Performed by: NURSE PRACTITIONER

## 2025-09-04 RX ORDER — PANTOPRAZOLE SODIUM 40 MG/1
40 TABLET, DELAYED RELEASE ORAL 2 TIMES DAILY
Status: DISPENSED | OUTPATIENT
Start: 2025-09-04

## 2025-09-04 RX ADMIN — PANTOPRAZOLE SODIUM 40 MG: 40 INJECTION, POWDER, FOR SOLUTION INTRAVENOUS at 10:44

## 2025-09-04 RX ADMIN — PIPERACILLIN SODIUM AND TAZOBACTAM SODIUM 2.25 G: 2; .25 INJECTION, SOLUTION INTRAVENOUS at 20:36

## 2025-09-04 RX ADMIN — LAMOTRIGINE 200 MG: 100 TABLET ORAL at 08:22

## 2025-09-04 RX ADMIN — INSULIN LISPRO 2 UNITS: 100 INJECTION, SOLUTION INTRAVENOUS; SUBCUTANEOUS at 08:22

## 2025-09-04 RX ADMIN — LISINOPRIL 5 MG: 5 TABLET ORAL at 08:22

## 2025-09-04 RX ADMIN — PIPERACILLIN SODIUM AND TAZOBACTAM SODIUM 2.25 G: 2; .25 INJECTION, SOLUTION INTRAVENOUS at 01:57

## 2025-09-04 RX ADMIN — LIDOCAINE 4% 1 PATCH: 40 PATCH TOPICAL at 08:23

## 2025-09-04 RX ADMIN — HEPARIN SODIUM 5000 UNITS: 5000 INJECTION INTRAVENOUS; SUBCUTANEOUS at 10:44

## 2025-09-04 RX ADMIN — SUCRALFATE 1 G: 1 SUSPENSION ORAL at 12:35

## 2025-09-04 RX ADMIN — PIPERACILLIN SODIUM AND TAZOBACTAM SODIUM 2.25 G: 2; .25 INJECTION, SOLUTION INTRAVENOUS at 14:47

## 2025-09-04 RX ADMIN — SUCRALFATE 1 G: 1 SUSPENSION ORAL at 05:28

## 2025-09-04 RX ADMIN — SUCRALFATE 1 G: 1 SUSPENSION ORAL at 17:55

## 2025-09-04 RX ADMIN — PIPERACILLIN SODIUM AND TAZOBACTAM SODIUM 2.25 G: 2; .25 INJECTION, SOLUTION INTRAVENOUS at 08:23

## 2025-09-04 RX ADMIN — PANTOPRAZOLE SODIUM 40 MG: 40 TABLET, DELAYED RELEASE ORAL at 20:36

## 2025-09-04 RX ADMIN — HEPARIN SODIUM 5000 UNITS: 5000 INJECTION INTRAVENOUS; SUBCUTANEOUS at 21:00

## 2025-09-04 SDOH — ECONOMIC STABILITY: FOOD INSECURITY: WITHIN THE PAST 12 MONTHS, YOU WORRIED THAT YOUR FOOD WOULD RUN OUT BEFORE YOU GOT THE MONEY TO BUY MORE.: NEVER TRUE

## 2025-09-04 SDOH — HEALTH STABILITY: MENTAL HEALTH: HOW OFTEN DO YOU HAVE SIX OR MORE DRINKS ON ONE OCCASION?: NEVER

## 2025-09-04 SDOH — ECONOMIC STABILITY: FOOD INSECURITY: WITHIN THE PAST 12 MONTHS, THE FOOD YOU BOUGHT JUST DIDN'T LAST AND YOU DIDN'T HAVE MONEY TO GET MORE.: NEVER TRUE

## 2025-09-04 SDOH — HEALTH STABILITY: MENTAL HEALTH
DO YOU FEEL STRESS - TENSE, RESTLESS, NERVOUS, OR ANXIOUS, OR UNABLE TO SLEEP AT NIGHT BECAUSE YOUR MIND IS TROUBLED ALL THE TIME - THESE DAYS?: NOT AT ALL

## 2025-09-04 SDOH — ECONOMIC STABILITY: HOUSING INSECURITY: AT ANY TIME IN THE PAST 12 MONTHS, WERE YOU HOMELESS OR LIVING IN A SHELTER (INCLUDING NOW)?: NO

## 2025-09-04 SDOH — SOCIAL STABILITY: SOCIAL NETWORK: HOW OFTEN DO YOU ATTEND MEETINGS OF THE CLUBS OR ORGANIZATIONS YOU BELONG TO?: NEVER

## 2025-09-04 SDOH — HEALTH STABILITY: MENTAL HEALTH: HOW OFTEN DO YOU HAVE A DRINK CONTAINING ALCOHOL?: MONTHLY OR LESS

## 2025-09-04 SDOH — SOCIAL STABILITY: SOCIAL INSECURITY: ARE YOU MARRIED, WIDOWED, DIVORCED, SEPARATED, NEVER MARRIED, OR LIVING WITH A PARTNER?: WIDOWED

## 2025-09-04 SDOH — SOCIAL STABILITY: SOCIAL NETWORK: IN A TYPICAL WEEK, HOW MANY TIMES DO YOU TALK ON THE PHONE WITH FAMILY, FRIENDS, OR NEIGHBORS?: NEVER

## 2025-09-04 SDOH — HEALTH STABILITY: PHYSICAL HEALTH: ON AVERAGE, HOW MANY DAYS PER WEEK DO YOU ENGAGE IN MODERATE TO STRENUOUS EXERCISE (LIKE A BRISK WALK)?: 1 DAY

## 2025-09-04 SDOH — SOCIAL STABILITY: SOCIAL NETWORK: HOW OFTEN DO YOU ATTEND CHURCH OR RELIGIOUS SERVICES?: NEVER

## 2025-09-04 SDOH — SOCIAL STABILITY: SOCIAL NETWORK: HOW OFTEN DO YOU GET TOGETHER WITH FRIENDS OR RELATIVES?: ONCE A WEEK

## 2025-09-04 SDOH — HEALTH STABILITY: MENTAL HEALTH: HOW MANY DRINKS CONTAINING ALCOHOL DO YOU HAVE ON A TYPICAL DAY WHEN YOU ARE DRINKING?: 1 OR 2

## 2025-09-04 SDOH — ECONOMIC STABILITY: HOUSING INSECURITY: IN THE LAST 12 MONTHS, WAS THERE A TIME WHEN YOU WERE NOT ABLE TO PAY THE MORTGAGE OR RENT ON TIME?: NO

## 2025-09-04 SDOH — ECONOMIC STABILITY: FOOD INSECURITY: HOW HARD IS IT FOR YOU TO PAY FOR THE VERY BASICS LIKE FOOD, HOUSING, MEDICAL CARE, AND HEATING?: NOT HARD AT ALL

## 2025-09-04 SDOH — SOCIAL STABILITY: SOCIAL INSECURITY: WITHIN THE LAST YEAR, HAVE YOU BEEN HUMILIATED OR EMOTIONALLY ABUSED IN OTHER WAYS BY YOUR PARTNER OR EX-PARTNER?: NO

## 2025-09-04 SDOH — SOCIAL STABILITY: SOCIAL INSECURITY: WITHIN THE LAST YEAR, HAVE YOU BEEN AFRAID OF YOUR PARTNER OR EX-PARTNER?: NO

## 2025-09-04 SDOH — HEALTH STABILITY: PHYSICAL HEALTH: ON AVERAGE, HOW MANY MINUTES DO YOU ENGAGE IN EXERCISE AT THIS LEVEL?: 60 MIN

## 2025-09-04 SDOH — ECONOMIC STABILITY: HOUSING INSECURITY: IN THE PAST 12 MONTHS, HOW MANY TIMES HAVE YOU MOVED WHERE YOU WERE LIVING?: 0

## 2025-09-04 SDOH — ECONOMIC STABILITY: TRANSPORTATION INSECURITY: IN THE PAST 12 MONTHS, HAS LACK OF TRANSPORTATION KEPT YOU FROM MEDICAL APPOINTMENTS OR FROM GETTING MEDICATIONS?: NO

## 2025-09-04 SDOH — ECONOMIC STABILITY: INCOME INSECURITY: IN THE PAST 12 MONTHS HAS THE ELECTRIC, GAS, OIL, OR WATER COMPANY THREATENED TO SHUT OFF SERVICES IN YOUR HOME?: NO

## 2025-09-04 ASSESSMENT — COGNITIVE AND FUNCTIONAL STATUS - GENERAL
MOVING TO AND FROM BED TO CHAIR: A LITTLE
CLIMB 3 TO 5 STEPS WITH RAILING: A LOT
MOVING TO AND FROM BED TO CHAIR: A LITTLE
MOVING FROM LYING ON BACK TO SITTING ON SIDE OF FLAT BED WITH BEDRAILS: A LITTLE
TURNING FROM BACK TO SIDE WHILE IN FLAT BAD: A LITTLE
DAILY ACTIVITIY SCORE: 22
WALKING IN HOSPITAL ROOM: A LITTLE
CLIMB 3 TO 5 STEPS WITH RAILING: A LITTLE
MOBILITY SCORE: 17
WALKING IN HOSPITAL ROOM: A LITTLE
DRESSING REGULAR LOWER BODY CLOTHING: A LITTLE
STANDING UP FROM CHAIR USING ARMS: A LITTLE
MOBILITY SCORE: 21
TOILETING: A LITTLE

## 2025-09-04 ASSESSMENT — ACTIVITIES OF DAILY LIVING (ADL): LACK_OF_TRANSPORTATION: NO

## 2025-09-04 ASSESSMENT — LIFESTYLE VARIABLES
SKIP TO QUESTIONS 9-10: 1
AUDIT-C TOTAL SCORE: 1

## 2025-09-04 ASSESSMENT — PAIN - FUNCTIONAL ASSESSMENT
PAIN_FUNCTIONAL_ASSESSMENT: 0-10
PAIN_FUNCTIONAL_ASSESSMENT: WONG-BAKER FACES
PAIN_FUNCTIONAL_ASSESSMENT: 0-10

## 2025-09-04 ASSESSMENT — PAIN SCALES - GENERAL
PAINLEVEL_OUTOF10: 0 - NO PAIN

## 2025-09-05 PROBLEM — E16.2 HYPOGLYCEMIA: Status: ACTIVE | Noted: 2025-09-05

## 2025-09-05 PROBLEM — R10.9 ABDOMINAL PAIN: Status: ACTIVE | Noted: 2025-09-05

## 2025-09-05 LAB
ALBUMIN SERPL BCP-MCNC: 3.4 G/DL (ref 3.4–5)
ALP SERPL-CCNC: 97 U/L (ref 33–136)
ALT SERPL W P-5'-P-CCNC: 7 U/L (ref 7–45)
ANION GAP SERPL CALCULATED.3IONS-SCNC: 11 MMOL/L (ref 10–20)
AST SERPL W P-5'-P-CCNC: 11 U/L (ref 9–39)
BASOPHILS # BLD AUTO: 0.04 X10*3/UL (ref 0–0.1)
BASOPHILS NFR BLD AUTO: 0.8 %
BILIRUB SERPL-MCNC: 0.5 MG/DL (ref 0–1.2)
BUN SERPL-MCNC: 10 MG/DL (ref 6–23)
CALCIUM SERPL-MCNC: 8.8 MG/DL (ref 8.6–10.3)
CHLORIDE SERPL-SCNC: 106 MMOL/L (ref 98–107)
CO2 SERPL-SCNC: 26 MMOL/L (ref 21–32)
CREAT SERPL-MCNC: 1.69 MG/DL (ref 0.5–1.05)
EGFRCR SERPLBLD CKD-EPI 2021: 32 ML/MIN/1.73M*2
EOSINOPHIL # BLD AUTO: 0.21 X10*3/UL (ref 0–0.4)
EOSINOPHIL NFR BLD AUTO: 4.1 %
ERYTHROCYTE [DISTWIDTH] IN BLOOD BY AUTOMATED COUNT: 14.6 % (ref 11.5–14.5)
GLUCOSE BLD MANUAL STRIP-MCNC: 122 MG/DL (ref 74–99)
GLUCOSE BLD MANUAL STRIP-MCNC: 170 MG/DL (ref 74–99)
GLUCOSE BLD MANUAL STRIP-MCNC: 173 MG/DL (ref 74–99)
GLUCOSE BLD MANUAL STRIP-MCNC: 179 MG/DL (ref 74–99)
GLUCOSE BLD MANUAL STRIP-MCNC: 183 MG/DL (ref 74–99)
GLUCOSE BLD MANUAL STRIP-MCNC: 232 MG/DL (ref 74–99)
GLUCOSE SERPL-MCNC: 153 MG/DL (ref 74–99)
HCT VFR BLD AUTO: 32.5 % (ref 36–46)
HGB BLD-MCNC: 10 G/DL (ref 12–16)
IMM GRANULOCYTES # BLD AUTO: 0.02 X10*3/UL (ref 0–0.5)
IMM GRANULOCYTES NFR BLD AUTO: 0.4 % (ref 0–0.9)
LYMPHOCYTES # BLD AUTO: 1.53 X10*3/UL (ref 0.8–3)
LYMPHOCYTES NFR BLD AUTO: 30.1 %
MCH RBC QN AUTO: 25.6 PG (ref 26–34)
MCHC RBC AUTO-ENTMCNC: 30.8 G/DL (ref 32–36)
MCV RBC AUTO: 83 FL (ref 80–100)
MONOCYTES # BLD AUTO: 0.57 X10*3/UL (ref 0.05–0.8)
MONOCYTES NFR BLD AUTO: 11.2 %
NEUTROPHILS # BLD AUTO: 2.71 X10*3/UL (ref 1.6–5.5)
NEUTROPHILS NFR BLD AUTO: 53.4 %
NRBC BLD-RTO: 0 /100 WBCS (ref 0–0)
PLATELET # BLD AUTO: 237 X10*3/UL (ref 150–450)
POTASSIUM SERPL-SCNC: 3.9 MMOL/L (ref 3.5–5.3)
PROT SERPL-MCNC: 6.1 G/DL (ref 6.4–8.2)
RBC # BLD AUTO: 3.91 X10*6/UL (ref 4–5.2)
SODIUM SERPL-SCNC: 139 MMOL/L (ref 136–145)
WBC # BLD AUTO: 5.1 X10*3/UL (ref 4.4–11.3)

## 2025-09-05 PROCEDURE — 99232 SBSQ HOSP IP/OBS MODERATE 35: CPT

## 2025-09-05 PROCEDURE — 82947 ASSAY GLUCOSE BLOOD QUANT: CPT

## 2025-09-05 PROCEDURE — 97110 THERAPEUTIC EXERCISES: CPT | Mod: GO,CO

## 2025-09-05 PROCEDURE — 2500000001 HC RX 250 WO HCPCS SELF ADMINISTERED DRUGS (ALT 637 FOR MEDICARE OP): Performed by: INTERNAL MEDICINE

## 2025-09-05 PROCEDURE — 2500000001 HC RX 250 WO HCPCS SELF ADMINISTERED DRUGS (ALT 637 FOR MEDICARE OP)

## 2025-09-05 PROCEDURE — 97110 THERAPEUTIC EXERCISES: CPT | Mod: GP,CQ

## 2025-09-05 PROCEDURE — 2060000001 HC INTERMEDIATE ICU ROOM DAILY

## 2025-09-05 PROCEDURE — 2500000002 HC RX 250 W HCPCS SELF ADMINISTERED DRUGS (ALT 637 FOR MEDICARE OP, ALT 636 FOR OP/ED): Performed by: INTERNAL MEDICINE

## 2025-09-05 PROCEDURE — 85025 COMPLETE CBC W/AUTO DIFF WBC: CPT | Performed by: INTERNAL MEDICINE

## 2025-09-05 PROCEDURE — 2500000002 HC RX 250 W HCPCS SELF ADMINISTERED DRUGS (ALT 637 FOR MEDICARE OP, ALT 636 FOR OP/ED)

## 2025-09-05 PROCEDURE — 97535 SELF CARE MNGMENT TRAINING: CPT | Mod: GO,CO

## 2025-09-05 PROCEDURE — 2500000004 HC RX 250 GENERAL PHARMACY W/ HCPCS (ALT 636 FOR OP/ED): Performed by: INTERNAL MEDICINE

## 2025-09-05 PROCEDURE — 99232 SBSQ HOSP IP/OBS MODERATE 35: CPT | Performed by: INTERNAL MEDICINE

## 2025-09-05 PROCEDURE — 80053 COMPREHEN METABOLIC PANEL: CPT | Performed by: INTERNAL MEDICINE

## 2025-09-05 PROCEDURE — 82653 EL-1 FECAL QUANTITATIVE: CPT

## 2025-09-05 PROCEDURE — 36415 COLL VENOUS BLD VENIPUNCTURE: CPT | Performed by: INTERNAL MEDICINE

## 2025-09-05 PROCEDURE — 97116 GAIT TRAINING THERAPY: CPT | Mod: GP,CQ

## 2025-09-05 RX ORDER — DEXTROSE 50 % IN WATER (D50W) INTRAVENOUS SYRINGE
12.5
Status: ACTIVE | OUTPATIENT
Start: 2025-09-05

## 2025-09-05 RX ORDER — DEXTROSE 50 % IN WATER (D50W) INTRAVENOUS SYRINGE
25
Status: ACTIVE | OUTPATIENT
Start: 2025-09-05

## 2025-09-05 RX ADMIN — PIPERACILLIN SODIUM AND TAZOBACTAM SODIUM 2.25 G: 2; .25 INJECTION, SOLUTION INTRAVENOUS at 16:00

## 2025-09-05 RX ADMIN — HEPARIN SODIUM 5000 UNITS: 5000 INJECTION INTRAVENOUS; SUBCUTANEOUS at 10:38

## 2025-09-05 RX ADMIN — SUCRALFATE 1 G: 1 SUSPENSION ORAL at 18:01

## 2025-09-05 RX ADMIN — LAMOTRIGINE 200 MG: 100 TABLET ORAL at 08:17

## 2025-09-05 RX ADMIN — PANTOPRAZOLE SODIUM 40 MG: 40 TABLET, DELAYED RELEASE ORAL at 08:17

## 2025-09-05 RX ADMIN — INSULIN LISPRO 2 UNITS: 100 INJECTION, SOLUTION INTRAVENOUS; SUBCUTANEOUS at 08:16

## 2025-09-05 RX ADMIN — PIPERACILLIN SODIUM AND TAZOBACTAM SODIUM 2.25 G: 2; .25 INJECTION, SOLUTION INTRAVENOUS at 21:34

## 2025-09-05 RX ADMIN — PANTOPRAZOLE SODIUM 40 MG: 40 TABLET, DELAYED RELEASE ORAL at 21:33

## 2025-09-05 RX ADMIN — HEPARIN SODIUM 5000 UNITS: 5000 INJECTION INTRAVENOUS; SUBCUTANEOUS at 21:33

## 2025-09-05 RX ADMIN — LISINOPRIL 5 MG: 5 TABLET ORAL at 08:17

## 2025-09-05 RX ADMIN — PIPERACILLIN SODIUM AND TAZOBACTAM SODIUM 2.25 G: 2; .25 INJECTION, SOLUTION INTRAVENOUS at 08:16

## 2025-09-05 RX ADMIN — SUCRALFATE 1 G: 1 SUSPENSION ORAL at 00:53

## 2025-09-05 RX ADMIN — PIPERACILLIN SODIUM AND TAZOBACTAM SODIUM 2.25 G: 2; .25 INJECTION, SOLUTION INTRAVENOUS at 01:53

## 2025-09-05 RX ADMIN — SUCRALFATE 1 G: 1 SUSPENSION ORAL at 05:34

## 2025-09-05 ASSESSMENT — COGNITIVE AND FUNCTIONAL STATUS - GENERAL
DAILY ACTIVITIY SCORE: 19
DRESSING REGULAR UPPER BODY CLOTHING: A LITTLE
HELP NEEDED FOR BATHING: A LITTLE
MOVING FROM LYING ON BACK TO SITTING ON SIDE OF FLAT BED WITH BEDRAILS: A LITTLE
MOBILITY SCORE: 17
TURNING FROM BACK TO SIDE WHILE IN FLAT BAD: A LITTLE
MOVING TO AND FROM BED TO CHAIR: A LITTLE
DAILY ACTIVITIY SCORE: 22
DRESSING REGULAR LOWER BODY CLOTHING: A LITTLE
TOILETING: A LITTLE
MOVING TO AND FROM BED TO CHAIR: A LITTLE
MOBILITY SCORE: 21
WALKING IN HOSPITAL ROOM: A LITTLE
CLIMB 3 TO 5 STEPS WITH RAILING: A LOT
DRESSING REGULAR LOWER BODY CLOTHING: A LITTLE
TOILETING: A LITTLE
PERSONAL GROOMING: A LITTLE
CLIMB 3 TO 5 STEPS WITH RAILING: A LOT
STANDING UP FROM CHAIR USING ARMS: A LITTLE

## 2025-09-05 ASSESSMENT — ACTIVITIES OF DAILY LIVING (ADL)
HOME_MANAGEMENT_TIME_ENTRY: 15
BATHING_LEVEL_OF_ASSISTANCE: MAXIMUM ASSISTANCE
BATHING_WHERE_ASSESSED: OTHER (COMMENT)

## 2025-09-05 ASSESSMENT — PAIN SCALES - GENERAL
PAINLEVEL_OUTOF10: 3
PAINLEVEL_OUTOF10: 3
PAINLEVEL_OUTOF10: 0 - NO PAIN

## 2025-09-05 ASSESSMENT — PAIN - FUNCTIONAL ASSESSMENT
PAIN_FUNCTIONAL_ASSESSMENT: WONG-BAKER FACES
PAIN_FUNCTIONAL_ASSESSMENT: 0-10

## 2025-09-06 LAB
ALBUMIN SERPL BCP-MCNC: 3.4 G/DL (ref 3.4–5)
ALP SERPL-CCNC: 106 U/L (ref 33–136)
ALT SERPL W P-5'-P-CCNC: 9 U/L (ref 7–45)
ANION GAP SERPL CALCULATED.3IONS-SCNC: 13 MMOL/L (ref 10–20)
AST SERPL W P-5'-P-CCNC: 15 U/L (ref 9–39)
BACTERIA BLD CULT: NORMAL
BACTERIA BLD CULT: NORMAL
BASOPHILS # BLD AUTO: 0.06 X10*3/UL (ref 0–0.1)
BASOPHILS NFR BLD AUTO: 1 %
BILIRUB SERPL-MCNC: 0.4 MG/DL (ref 0–1.2)
BUN SERPL-MCNC: 11 MG/DL (ref 6–23)
CALCIUM SERPL-MCNC: 8.9 MG/DL (ref 8.6–10.3)
CALPROTECTIN STL-MCNT: 33 UG/G
CHLORIDE SERPL-SCNC: 106 MMOL/L (ref 98–107)
CO2 SERPL-SCNC: 25 MMOL/L (ref 21–32)
CORTIS AM PEAK SERPL-MCNC: 12 UG/DL (ref 5–20)
CREAT SERPL-MCNC: 1.7 MG/DL (ref 0.5–1.05)
EGFRCR SERPLBLD CKD-EPI 2021: 31 ML/MIN/1.73M*2
EOSINOPHIL # BLD AUTO: 0.27 X10*3/UL (ref 0–0.4)
EOSINOPHIL NFR BLD AUTO: 4.4 %
ERYTHROCYTE [DISTWIDTH] IN BLOOD BY AUTOMATED COUNT: 14.6 % (ref 11.5–14.5)
GLUCOSE BLD MANUAL STRIP-MCNC: 136 MG/DL (ref 74–99)
GLUCOSE BLD MANUAL STRIP-MCNC: 150 MG/DL (ref 74–99)
GLUCOSE BLD MANUAL STRIP-MCNC: 159 MG/DL (ref 74–99)
GLUCOSE BLD MANUAL STRIP-MCNC: 168 MG/DL (ref 74–99)
GLUCOSE BLD MANUAL STRIP-MCNC: 214 MG/DL (ref 74–99)
GLUCOSE BLD MANUAL STRIP-MCNC: 224 MG/DL (ref 74–99)
GLUCOSE SERPL-MCNC: 152 MG/DL (ref 74–99)
HCT VFR BLD AUTO: 31.4 % (ref 36–46)
HGB BLD-MCNC: 9.9 G/DL (ref 12–16)
IMM GRANULOCYTES # BLD AUTO: 0.06 X10*3/UL (ref 0–0.5)
IMM GRANULOCYTES NFR BLD AUTO: 1 % (ref 0–0.9)
LYMPHOCYTES # BLD AUTO: 1.78 X10*3/UL (ref 0.8–3)
LYMPHOCYTES NFR BLD AUTO: 29 %
MCH RBC QN AUTO: 26 PG (ref 26–34)
MCHC RBC AUTO-ENTMCNC: 31.5 G/DL (ref 32–36)
MCV RBC AUTO: 82 FL (ref 80–100)
MONOCYTES # BLD AUTO: 0.69 X10*3/UL (ref 0.05–0.8)
MONOCYTES NFR BLD AUTO: 11.2 %
NEUTROPHILS # BLD AUTO: 3.28 X10*3/UL (ref 1.6–5.5)
NEUTROPHILS NFR BLD AUTO: 53.4 %
NRBC BLD-RTO: 0 /100 WBCS (ref 0–0)
PLATELET # BLD AUTO: 255 X10*3/UL (ref 150–450)
POTASSIUM SERPL-SCNC: 3.8 MMOL/L (ref 3.5–5.3)
PROT SERPL-MCNC: 6.4 G/DL (ref 6.4–8.2)
RBC # BLD AUTO: 3.81 X10*6/UL (ref 4–5.2)
SODIUM SERPL-SCNC: 140 MMOL/L (ref 136–145)
WBC # BLD AUTO: 6.1 X10*3/UL (ref 4.4–11.3)

## 2025-09-06 PROCEDURE — 82947 ASSAY GLUCOSE BLOOD QUANT: CPT

## 2025-09-06 PROCEDURE — 80053 COMPREHEN METABOLIC PANEL: CPT | Performed by: INTERNAL MEDICINE

## 2025-09-06 PROCEDURE — 99232 SBSQ HOSP IP/OBS MODERATE 35: CPT | Performed by: INTERNAL MEDICINE

## 2025-09-06 PROCEDURE — 2500000002 HC RX 250 W HCPCS SELF ADMINISTERED DRUGS (ALT 637 FOR MEDICARE OP, ALT 636 FOR OP/ED)

## 2025-09-06 PROCEDURE — 2500000004 HC RX 250 GENERAL PHARMACY W/ HCPCS (ALT 636 FOR OP/ED): Performed by: INTERNAL MEDICINE

## 2025-09-06 PROCEDURE — 36415 COLL VENOUS BLD VENIPUNCTURE: CPT | Performed by: INTERNAL MEDICINE

## 2025-09-06 PROCEDURE — 99232 SBSQ HOSP IP/OBS MODERATE 35: CPT

## 2025-09-06 PROCEDURE — 2500000001 HC RX 250 WO HCPCS SELF ADMINISTERED DRUGS (ALT 637 FOR MEDICARE OP)

## 2025-09-06 PROCEDURE — 85025 COMPLETE CBC W/AUTO DIFF WBC: CPT | Performed by: INTERNAL MEDICINE

## 2025-09-06 PROCEDURE — 2500000001 HC RX 250 WO HCPCS SELF ADMINISTERED DRUGS (ALT 637 FOR MEDICARE OP): Performed by: INTERNAL MEDICINE

## 2025-09-06 PROCEDURE — 2500000002 HC RX 250 W HCPCS SELF ADMINISTERED DRUGS (ALT 637 FOR MEDICARE OP, ALT 636 FOR OP/ED): Performed by: INTERNAL MEDICINE

## 2025-09-06 PROCEDURE — 2060000001 HC INTERMEDIATE ICU ROOM DAILY

## 2025-09-06 PROCEDURE — 82533 TOTAL CORTISOL: CPT | Mod: TRILAB | Performed by: INTERNAL MEDICINE

## 2025-09-06 RX ORDER — LISINOPRIL 5 MG/1
5 TABLET ORAL DAILY
Status: CANCELLED | OUTPATIENT
Start: 2025-09-07

## 2025-09-06 RX ORDER — METRONIDAZOLE 500 MG/100ML
500 INJECTION, SOLUTION INTRAVENOUS EVERY 8 HOURS
Status: DISCONTINUED | OUTPATIENT
Start: 2025-09-06 | End: 2025-09-07

## 2025-09-06 RX ORDER — POLYETHYLENE GLYCOL 3350, SODIUM CHLORIDE, SODIUM BICARBONATE, POTASSIUM CHLORIDE 420; 11.2; 5.72; 1.48 G/4L; G/4L; G/4L; G/4L
4000 POWDER, FOR SOLUTION ORAL ONCE
Status: COMPLETED | OUTPATIENT
Start: 2025-09-07 | End: 2025-09-07

## 2025-09-06 RX ADMIN — INSULIN LISPRO 4 UNITS: 100 INJECTION, SOLUTION INTRAVENOUS; SUBCUTANEOUS at 12:42

## 2025-09-06 RX ADMIN — HEPARIN SODIUM 5000 UNITS: 5000 INJECTION INTRAVENOUS; SUBCUTANEOUS at 21:14

## 2025-09-06 RX ADMIN — INSULIN LISPRO 2 UNITS: 100 INJECTION, SOLUTION INTRAVENOUS; SUBCUTANEOUS at 17:28

## 2025-09-06 RX ADMIN — METRONIDAZOLE 500 MG: 500 INJECTION, SOLUTION INTRAVENOUS at 21:14

## 2025-09-06 RX ADMIN — PANTOPRAZOLE SODIUM 40 MG: 40 TABLET, DELAYED RELEASE ORAL at 09:56

## 2025-09-06 RX ADMIN — HEPARIN SODIUM 5000 UNITS: 5000 INJECTION INTRAVENOUS; SUBCUTANEOUS at 10:13

## 2025-09-06 RX ADMIN — SUCRALFATE 1 G: 1 SUSPENSION ORAL at 12:43

## 2025-09-06 RX ADMIN — PANTOPRAZOLE SODIUM 40 MG: 40 TABLET, DELAYED RELEASE ORAL at 21:14

## 2025-09-06 RX ADMIN — LAMOTRIGINE 200 MG: 100 TABLET ORAL at 09:56

## 2025-09-06 RX ADMIN — LISINOPRIL 5 MG: 5 TABLET ORAL at 09:56

## 2025-09-06 RX ADMIN — SUCRALFATE 1 G: 1 SUSPENSION ORAL at 17:29

## 2025-09-06 RX ADMIN — METRONIDAZOLE 500 MG: 500 INJECTION, SOLUTION INTRAVENOUS at 13:22

## 2025-09-06 RX ADMIN — SUCRALFATE 1 G: 1 SUSPENSION ORAL at 06:02

## 2025-09-06 RX ADMIN — CHOLESTYRAMINE 4 G: 4 POWDER, FOR SUSPENSION ORAL at 17:29

## 2025-09-06 RX ADMIN — SUCRALFATE 1 G: 1 SUSPENSION ORAL at 00:26

## 2025-09-06 RX ADMIN — PIPERACILLIN SODIUM AND TAZOBACTAM SODIUM 2.25 G: 2; .25 INJECTION, SOLUTION INTRAVENOUS at 09:56

## 2025-09-06 RX ADMIN — PIPERACILLIN SODIUM AND TAZOBACTAM SODIUM 2.25 G: 2; .25 INJECTION, SOLUTION INTRAVENOUS at 04:20

## 2025-09-06 ASSESSMENT — COGNITIVE AND FUNCTIONAL STATUS - GENERAL
WALKING IN HOSPITAL ROOM: A LITTLE
MOBILITY SCORE: 21
MOVING TO AND FROM BED TO CHAIR: A LITTLE
CLIMB 3 TO 5 STEPS WITH RAILING: A LITTLE

## 2025-09-06 ASSESSMENT — PAIN SCALES - GENERAL
PAINLEVEL_OUTOF10: 0 - NO PAIN
PAINLEVEL_OUTOF10: 2
PAINLEVEL_OUTOF10: 0 - NO PAIN

## 2025-09-06 ASSESSMENT — PAIN - FUNCTIONAL ASSESSMENT
PAIN_FUNCTIONAL_ASSESSMENT: WONG-BAKER FACES
PAIN_FUNCTIONAL_ASSESSMENT: 0-10
PAIN_FUNCTIONAL_ASSESSMENT: 0-10

## 2025-09-06 ASSESSMENT — PAIN DESCRIPTION - DESCRIPTORS: DESCRIPTORS: DISCOMFORT

## 2025-09-07 VITALS
OXYGEN SATURATION: 97 % | DIASTOLIC BLOOD PRESSURE: 57 MMHG | RESPIRATION RATE: 14 BRPM | WEIGHT: 222.66 LBS | SYSTOLIC BLOOD PRESSURE: 133 MMHG | TEMPERATURE: 98.4 F | HEIGHT: 63 IN | BODY MASS INDEX: 39.45 KG/M2 | HEART RATE: 56 BPM

## 2025-09-07 LAB
ALBUMIN SERPL BCP-MCNC: 3.3 G/DL (ref 3.4–5)
ALP SERPL-CCNC: 108 U/L (ref 33–136)
ALT SERPL W P-5'-P-CCNC: 12 U/L (ref 7–45)
ANION GAP SERPL CALCULATED.3IONS-SCNC: 12 MMOL/L (ref 10–20)
AST SERPL W P-5'-P-CCNC: 15 U/L (ref 9–39)
BASOPHILS # BLD AUTO: 0.07 X10*3/UL (ref 0–0.1)
BASOPHILS NFR BLD AUTO: 1.2 %
BILIRUB SERPL-MCNC: 0.2 MG/DL (ref 0–1.2)
BUN SERPL-MCNC: 13 MG/DL (ref 6–23)
CALCIUM SERPL-MCNC: 8.8 MG/DL (ref 8.6–10.3)
CHLORIDE SERPL-SCNC: 109 MMOL/L (ref 98–107)
CO2 SERPL-SCNC: 24 MMOL/L (ref 21–32)
CREAT SERPL-MCNC: 1.58 MG/DL (ref 0.5–1.05)
EGFRCR SERPLBLD CKD-EPI 2021: 34 ML/MIN/1.73M*2
EOSINOPHIL # BLD AUTO: 0.28 X10*3/UL (ref 0–0.4)
EOSINOPHIL NFR BLD AUTO: 4.7 %
ERYTHROCYTE [DISTWIDTH] IN BLOOD BY AUTOMATED COUNT: 14.7 % (ref 11.5–14.5)
GLUCOSE BLD MANUAL STRIP-MCNC: 108 MG/DL (ref 74–99)
GLUCOSE BLD MANUAL STRIP-MCNC: 125 MG/DL (ref 74–99)
GLUCOSE BLD MANUAL STRIP-MCNC: 133 MG/DL (ref 74–99)
GLUCOSE BLD MANUAL STRIP-MCNC: 155 MG/DL (ref 74–99)
GLUCOSE BLD MANUAL STRIP-MCNC: 276 MG/DL (ref 74–99)
GLUCOSE BLD MANUAL STRIP-MCNC: 277 MG/DL (ref 74–99)
GLUCOSE BLD MANUAL STRIP-MCNC: 69 MG/DL (ref 74–99)
GLUCOSE BLD MANUAL STRIP-MCNC: 97 MG/DL (ref 74–99)
GLUCOSE SERPL-MCNC: 164 MG/DL (ref 74–99)
HCT VFR BLD AUTO: 32.5 % (ref 36–46)
HGB BLD-MCNC: 9.9 G/DL (ref 12–16)
IMM GRANULOCYTES # BLD AUTO: 0.06 X10*3/UL (ref 0–0.5)
IMM GRANULOCYTES NFR BLD AUTO: 1 % (ref 0–0.9)
LYMPHOCYTES # BLD AUTO: 1.8 X10*3/UL (ref 0.8–3)
LYMPHOCYTES NFR BLD AUTO: 30.5 %
MCH RBC QN AUTO: 25.6 PG (ref 26–34)
MCHC RBC AUTO-ENTMCNC: 30.5 G/DL (ref 32–36)
MCV RBC AUTO: 84 FL (ref 80–100)
MONOCYTES # BLD AUTO: 0.64 X10*3/UL (ref 0.05–0.8)
MONOCYTES NFR BLD AUTO: 10.8 %
NEUTROPHILS # BLD AUTO: 3.05 X10*3/UL (ref 1.6–5.5)
NEUTROPHILS NFR BLD AUTO: 51.8 %
NRBC BLD-RTO: 0 /100 WBCS (ref 0–0)
PLATELET # BLD AUTO: 249 X10*3/UL (ref 150–450)
POTASSIUM SERPL-SCNC: 3.7 MMOL/L (ref 3.5–5.3)
PROT SERPL-MCNC: 6.2 G/DL (ref 6.4–8.2)
RBC # BLD AUTO: 3.86 X10*6/UL (ref 4–5.2)
SODIUM SERPL-SCNC: 141 MMOL/L (ref 136–145)
WBC # BLD AUTO: 5.9 X10*3/UL (ref 4.4–11.3)

## 2025-09-07 PROCEDURE — 2500000004 HC RX 250 GENERAL PHARMACY W/ HCPCS (ALT 636 FOR OP/ED): Performed by: INTERNAL MEDICINE

## 2025-09-07 PROCEDURE — 2500000001 HC RX 250 WO HCPCS SELF ADMINISTERED DRUGS (ALT 637 FOR MEDICARE OP)

## 2025-09-07 PROCEDURE — 82947 ASSAY GLUCOSE BLOOD QUANT: CPT

## 2025-09-07 PROCEDURE — 2500000002 HC RX 250 W HCPCS SELF ADMINISTERED DRUGS (ALT 637 FOR MEDICARE OP, ALT 636 FOR OP/ED): Performed by: INTERNAL MEDICINE

## 2025-09-07 PROCEDURE — 2500000001 HC RX 250 WO HCPCS SELF ADMINISTERED DRUGS (ALT 637 FOR MEDICARE OP): Performed by: INTERNAL MEDICINE

## 2025-09-07 PROCEDURE — 84075 ASSAY ALKALINE PHOSPHATASE: CPT | Performed by: INTERNAL MEDICINE

## 2025-09-07 PROCEDURE — 2500000002 HC RX 250 W HCPCS SELF ADMINISTERED DRUGS (ALT 637 FOR MEDICARE OP, ALT 636 FOR OP/ED): Performed by: HOSPITALIST

## 2025-09-07 PROCEDURE — 2060000001 HC INTERMEDIATE ICU ROOM DAILY

## 2025-09-07 PROCEDURE — 2500000002 HC RX 250 W HCPCS SELF ADMINISTERED DRUGS (ALT 637 FOR MEDICARE OP, ALT 636 FOR OP/ED)

## 2025-09-07 PROCEDURE — 85025 COMPLETE CBC W/AUTO DIFF WBC: CPT | Performed by: INTERNAL MEDICINE

## 2025-09-07 PROCEDURE — 36415 COLL VENOUS BLD VENIPUNCTURE: CPT | Performed by: INTERNAL MEDICINE

## 2025-09-07 RX ORDER — INSULIN LISPRO 100 [IU]/ML
2 INJECTION, SOLUTION INTRAVENOUS; SUBCUTANEOUS ONCE
Status: COMPLETED | OUTPATIENT
Start: 2025-09-07 | End: 2025-09-07

## 2025-09-07 RX ADMIN — AMPICILLIN SODIUM AND SULBACTAM SODIUM 3 G: 2; 1 INJECTION, POWDER, FOR SOLUTION INTRAMUSCULAR; INTRAVENOUS at 08:49

## 2025-09-07 RX ADMIN — AMPICILLIN SODIUM AND SULBACTAM SODIUM 3 G: 2; 1 INJECTION, POWDER, FOR SOLUTION INTRAMUSCULAR; INTRAVENOUS at 02:26

## 2025-09-07 RX ADMIN — INSULIN LISPRO 2 UNITS: 100 INJECTION, SOLUTION INTRAVENOUS; SUBCUTANEOUS at 02:25

## 2025-09-07 RX ADMIN — CHOLESTYRAMINE 4 G: 4 POWDER, FOR SUSPENSION ORAL at 16:57

## 2025-09-07 RX ADMIN — INSULIN LISPRO 2 UNITS: 100 INJECTION, SOLUTION INTRAVENOUS; SUBCUTANEOUS at 08:48

## 2025-09-07 RX ADMIN — INSULIN LISPRO 6 UNITS: 100 INJECTION, SOLUTION INTRAVENOUS; SUBCUTANEOUS at 16:43

## 2025-09-07 RX ADMIN — SUCRALFATE 1 G: 1 SUSPENSION ORAL at 17:02

## 2025-09-07 RX ADMIN — ONDANSETRON 4 MG: 2 INJECTION, SOLUTION INTRAMUSCULAR; INTRAVENOUS at 08:48

## 2025-09-07 RX ADMIN — AMPICILLIN SODIUM AND SULBACTAM SODIUM 3 G: 2; 1 INJECTION, POWDER, FOR SOLUTION INTRAMUSCULAR; INTRAVENOUS at 14:45

## 2025-09-07 RX ADMIN — POLYETHYLENE GLYCOL 3350, SODIUM SULFATE ANHYDROUS, SODIUM BICARBONATE, SODIUM CHLORIDE, POTASSIUM CHLORIDE 4000 ML: 236; 22.74; 6.74; 5.86; 2.97 POWDER, FOR SOLUTION ORAL at 14:26

## 2025-09-07 RX ADMIN — AMPICILLIN SODIUM AND SULBACTAM SODIUM 3 G: 2; 1 INJECTION, POWDER, FOR SOLUTION INTRAMUSCULAR; INTRAVENOUS at 20:47

## 2025-09-07 RX ADMIN — ONDANSETRON 4 MG: 2 INJECTION, SOLUTION INTRAMUSCULAR; INTRAVENOUS at 17:16

## 2025-09-07 RX ADMIN — SUCRALFATE 1 G: 1 SUSPENSION ORAL at 00:07

## 2025-09-07 RX ADMIN — LAMOTRIGINE 200 MG: 100 TABLET ORAL at 08:48

## 2025-09-07 RX ADMIN — PANTOPRAZOLE SODIUM 40 MG: 40 TABLET, DELAYED RELEASE ORAL at 08:48

## 2025-09-07 RX ADMIN — HEPARIN SODIUM 5000 UNITS: 5000 INJECTION INTRAVENOUS; SUBCUTANEOUS at 11:00

## 2025-09-07 RX ADMIN — SUCRALFATE 1 G: 1 SUSPENSION ORAL at 12:53

## 2025-09-07 RX ADMIN — SUCRALFATE 1 G: 1 SUSPENSION ORAL at 05:23

## 2025-09-07 ASSESSMENT — COGNITIVE AND FUNCTIONAL STATUS - GENERAL
CLIMB 3 TO 5 STEPS WITH RAILING: A LITTLE
MOBILITY SCORE: 21
TOILETING: A LITTLE
DAILY ACTIVITIY SCORE: 23
CLIMB 3 TO 5 STEPS WITH RAILING: A LITTLE
WALKING IN HOSPITAL ROOM: A LITTLE
DAILY ACTIVITIY SCORE: 24
MOBILITY SCORE: 23
MOVING TO AND FROM BED TO CHAIR: A LITTLE

## 2025-09-07 ASSESSMENT — PAIN SCALES - GENERAL
PAINLEVEL_OUTOF10: 0 - NO PAIN

## 2025-10-17 ENCOUNTER — APPOINTMENT (OUTPATIENT)
Facility: CLINIC | Age: 75
End: 2025-10-17
Payer: MEDICARE